# Patient Record
Sex: FEMALE | Race: WHITE | NOT HISPANIC OR LATINO | Employment: FULL TIME | URBAN - METROPOLITAN AREA
[De-identification: names, ages, dates, MRNs, and addresses within clinical notes are randomized per-mention and may not be internally consistent; named-entity substitution may affect disease eponyms.]

---

## 2017-02-02 ENCOUNTER — OFFICE VISIT (OUTPATIENT)
Dept: HEMATOLOGY | Facility: CLINIC | Age: 42
End: 2017-02-02
Attending: PHYSICIAN ASSISTANT
Payer: COMMERCIAL

## 2017-02-02 VITALS
DIASTOLIC BLOOD PRESSURE: 58 MMHG | BODY MASS INDEX: 27.87 KG/M2 | SYSTOLIC BLOOD PRESSURE: 116 MMHG | RESPIRATION RATE: 18 BRPM | TEMPERATURE: 98.6 F | WEIGHT: 178 LBS

## 2017-02-02 DIAGNOSIS — I89.0 LYMPHEDEMA: ICD-10-CM

## 2017-02-02 DIAGNOSIS — Z86.718 PERSONAL HISTORY OF DVT (DEEP VEIN THROMBOSIS): ICD-10-CM

## 2017-02-02 DIAGNOSIS — D68.51 FACTOR V LEIDEN MUTATION (H): Primary | ICD-10-CM

## 2017-02-02 PROCEDURE — 99211 OFF/OP EST MAY X REQ PHY/QHP: CPT

## 2017-02-02 PROCEDURE — 99215 OFFICE O/P EST HI 40 MIN: CPT | Performed by: PHYSICIAN ASSISTANT

## 2017-02-02 NOTE — PROGRESS NOTES
[facility]  [address] [place] [address] [address], [address] [address]  Phone: [phone], Fax: [phone].    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  She states she was on estrogen since about  but had recently switched to a different dose before she experienced her clotting event.  She denies having stopped and restarted estrogen around this time.  She was treated for approximately one year after her clot with anticoagulation.  She states she was on warfarin but had difficulties with keeping her INR in goal range.  She denies ongoing issues with left leg swelling but does have pain with prolonged sitting and wears compression stockings when this happens.  She recently had her first child (delivered baby sherri Lockwood on 10/9/16) and was treated with Lovenox during the pregnancy per MFM group.  Initially this was 40mg Lovenox daily and then transitioned to 40mg BID.  She did well with this regimen and was continued until 6 weeks postpartum.  She now is interested in her birth control options and wanted to discuss the risk of clotting in her daughter.    Shauna was tested for factor V Leiden when she experienced her clot as her mother had known factor V Leiden diagnosed when she had a lower extremity clot after hysterectomy and was started on HRT.  Since then other family members have also been tested and found to carry the mutation.      ROS:  Denies any bleeding issues. No gum bleeding, No nose bleeds. Denies any hematuria or blood in stools. Denies any ecchymosis. Denies any lower extremities swelling or pain. Denies any fever, no chest pain. Denies any shortness of breath.    Past Medical  History:  Past Medical History   Diagnosis Date     Factor V Leiden (H)      cannot have birth control     Embolus (H) 26 yo     of lung from right leg-was on coumadin for a long while     Acne      GERD (gastroesophageal reflux disease)      Menarche age 14+     cycles q  24-27 x 4-5d        Past Surgical History:  Past Surgical History   Procedure Laterality Date     Hc tooth extraction w/forcep         Medications:  Current Outpatient Prescriptions   Medication Sig Dispense Refill     enoxaparin (LOVENOX) 40 MG/0.4ML syringe Inject 0.4 mLs (40 mg) Subcutaneous every 24 hours 18 mL 0     ibuprofen (ADVIL,MOTRIN) 600 MG tablet Take 1 tablet (600 mg) by mouth every 6 hours as needed for other (cramping) 60 tablet 0     senna-docusate (SENOKOT-S;PERICOLACE) 8.6-50 MG per tablet Take 1-2 tablets by mouth 2 times daily 100 tablet 0     order for DME Equipment being ordered: Bilateral Electric Breastpump 1 each 0     Prenatal Multivit-Min-Fe-FA (PRENATAL VITAMINS PO)        Probiotic Product (PROBIOTIC DAILY PO)        Omega-3 Fatty Acids (OMEGA-3 FISH OIL PO) Take 1 g by mouth daily          Allergies:  Allergies   Allergen Reactions     Nkda [No Known Drug Allergies]        Social History:  Denies any tobacco use. No significant alcohol use. Denies any illicit drug use. 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    Family History:  Mother with heterozygote factor V Leiden and estrogen provoked clot, both sisters with the same (one during IVF and the other postpartum).  None are on long term anticoagulation.  Maternal uncle - homozygote factor V Leiden and first lower extremity clot at about age 60.  No provoking event noted.  Also Paternal family hx of pancreatic cancer    Objectives:  Pleasant 41 year old female in no acute distress.  Vitals: B/P: Data Unavailable, T: Data Unavailable, P: Data Unavailable, R: Data Unavailable, Wt: 0 lbs 0 oz  Exam:   Left lower extremity without swelling or skin  changes.    Labs:  4/2005  Factor V Leiden heterozygote  Prothrombin gene mutation negative  Lupus antibody negative    Imaging:  Reviewed initial imaging from 4/2005:  CT/angio: bilateral PE  Left lower extremity US: thrombus in distal left superficial femoral vein, popliteal vein and pair posterior tibial veins.  Follow-up US on a few weeks later shows some recannulization but no other repeat US was done.    Assessment:  In summary, Shauna is a 41 year old female with a personal and family history of DVT and known heterozygosity for factor V Leiden.     Diagnosis:  Personal hx of DVT and PE (4/7/2005)  Heterozygote factor V Leiden  On estrogen at time of clot    Plan:  1) We discussed risk factors for recurrent thrombosis and that for Shauna we would recommend she be on prophylactic anticoagulation during times of increased risk (surgery, immobility, pregnancy). This could be with Lovenox or with one of the new oral anticoagulants unless she is being treated for pregnancy.  She understands this.  2) For birth control we recommend progestin only options as they confer less risk of thrombosis.  She will discuss with her GYN provider.  3) We do not recommend genetic testing for her daughter at this time but do recommend, given the family history, that she not use estrogen based birth control in the future even if she were to be tested for factor V Leiden and found to be negative.    We are happy to see Shauna if she has questions related to thrombosis risk or becomes pregnant again.      The patient is given our center's contact information and is instructed to call if he/she should have any further questions or concerns.   Gerda Arnett, nurse clinician is present throughout the entire clinic visit with the patient today.  Patient understands and agrees with the above plan and recommendation.    Total Time Spent:  60 minutes, all 60 minutes was spent on face-to-face consultation of the patient and coordination of  care in regard to history of DVT and factor V Leiden      Alexsandra Barrios MPH, PA-C  Physician Assistant  [facility]  [phone]-main line  [phone] pager

## 2017-02-02 NOTE — PATIENT INSTRUCTIONS
1. Consider anticoagulation with any future pregnancy.  2. Work with genetic consoler regarding future cancer risk and family history of VTE.  3. Return to clinic as needed.

## 2017-02-03 NOTE — NURSING NOTE
Patient with history of proximal left LE DVT  and pulmonary embolism heterozygote for Factor V Leiden  Provider discussed BC options given personal and family history with Factor V Leiden heterozgosity.  Factor V Leiden reviewed along with family history of VTE.  VTE risk factors, signs, and symptoms discussed.  PTS reviewed and prescription for GCS knee high 20-30 mm Hg provided.  MsFrancisco J Hernan has the contact information for the Center and was encouraged to all with any questions or concerns.

## 2017-04-19 NOTE — PROGRESS NOTES
SUBJECTIVE:                                                    Shauna Becerra is a 42 year old female who presents to clinic today for the following health issues:      URINARY TRACT SYMPTOMS      Duration: 2 months intermittent    Description  Orange to peach colored urine especially when not drinking enough water    Intensity:  moderate    Accompanying signs and symptoms:  Fever/chills: no   Flank pain no   Nausea and vomiting: YES- had 2 GI illnesses over last month  Vaginal symptoms: none  Abdominal/Pelvic Pain: no     History  History of frequent UTI's: no   History of kidney stones: no   Sexually Active: YES  Possibility of pregnancy: No    Precipitating or alleviating factors: None    Therapies tried and outcome: increase fluid intake   Outcome: seems to help    Pt has also has URI with laryngitis, currently taking OTC sinus and Tylenol     Intermittent color changes in urine since January.  Sees to occur more often when she is not drinking as much water.  No dysuria or frequency.  Never happened before.      No fevers, abd pain, diarrhea.  No easy bruising, no blood in her stool.  No recent diet changes.  Is getting her menses but symptoms have occurred when she is not menstruating.      Had blood test for life insurance policy, was on heparin during pregnancy.  Thinks there may have been abnormal bilirubin.      URI symptoms ongoing x 1 week.  Sore throat has improved.  Has hoarse voice.  No rhinitis or congestion.  Does have facial pressure, headache.  Bilateral ear pressure.  Dry cough.  No fever.      Patient Active Problem List   Diagnosis     Factor V Leiden (H)     GERD (gastroesophageal reflux disease)     Primary female infertility     Anxiety state     Family history of pancreatic cancer     Need for Tdap vaccination     Family history of breast cancer     Supervision of other normal pregnancy, antepartum     Personal history of DVT (deep vein thrombosis)     Pregnancy      (spontaneous  vaginal delivery)     Past Surgical History:   Procedure Laterality Date     HC TOOTH EXTRACTION W/FORCEP         Social History   Substance Use Topics     Smoking status: Never Smoker     Smokeless tobacco: Never Used     Alcohol use No     Family History   Problem Relation Age of Onset     Thrombophilia Mother      Breast Cancer Mother      Migraines Mother      Genitourinary Problems Mother      GERD Mother      CANCER Father 63     Pancreatic     Breast Cancer Maternal Grandmother 70     Breast Cancer Paternal Grandmother      Uterine Cancer Paternal Grandmother      Chronic Obstructive Pulmonary Disease Paternal Grandfather      Deep Vein Thrombosis Other          Current Outpatient Prescriptions   Medication Sig Dispense Refill     COMPRESSION STOCKINGS Wear graduated compression knee high 20- 30 mm Hg while awake (Patient not taking: Reported on 4/20/2017) 2 each 1     enoxaparin (LOVENOX) 40 MG/0.4ML syringe Inject 0.4 mLs (40 mg) Subcutaneous every 24 hours (Patient not taking: Reported on 4/20/2017) 18 mL 0     ibuprofen (ADVIL,MOTRIN) 600 MG tablet Take 1 tablet (600 mg) by mouth every 6 hours as needed for other (cramping) (Patient not taking: Reported on 4/20/2017) 60 tablet 0     senna-docusate (SENOKOT-S;PERICOLACE) 8.6-50 MG per tablet Take 1-2 tablets by mouth 2 times daily (Patient not taking: Reported on 4/20/2017) 100 tablet 0     order for DME Equipment being ordered: Bilateral Electric Breastpump (Patient not taking: Reported on 4/20/2017) 1 each 0     Prenatal Multivit-Min-Fe-FA (PRENATAL VITAMINS PO) Reported on 4/20/2017       Probiotic Product (PROBIOTIC DAILY PO) Reported on 4/20/2017       Omega-3 Fatty Acids (OMEGA-3 FISH OIL PO) Take 1 g by mouth daily Reported on 4/20/2017         ROS:  Const, , GI as above, otherwise negative       OBJECTIVE:                                                    /62 (BP Location: Left arm, Patient Position: Chair, Cuff Size: Adult Regular)   Pulse 78  Temp 98.9  F (37.2  C) (Oral)  Resp 16  Wt 173 lb (78.5 kg)  SpO2 98%  BMI 27.1 kg/m2   GENERAL APPEARANCE: healthy, alert and no distress  EYES: Eyes grossly normal to inspection and PERRL  HENT: ear canals and TM's normal and nose and mouth without ulcers or lesions  NECK: no adenopathy  RESP: lungs clear to auscultation - no rales, rhonchi or wheezes  CV: regular rates and rhythm, normal S1 S2, no S3 or S4 and no murmur, click or rub  PSYCH: mentation appears normal and affect normal/bright     Diagnostic test results:  Diagnostic Test Results:  Results for orders placed or performed in visit on 04/20/17 (from the past 24 hour(s))   UA reflex to Microscopic and Culture   Result Value Ref Range    Color Urine Straw     Appearance Urine Clear     Glucose Urine Negative NEG mg/dL    Bilirubin Urine (A) NEG     Small  This is an unconfirmed screening test result. A positive result may be false.      Ketones Urine Trace (A) NEG mg/dL    Specific Gravity Urine 1.020 1.003 - 1.035    Blood Urine Negative NEG    pH Urine 6.0 5.0 - 7.0 pH    Protein Albumin Urine 30 (A) NEG mg/dL    Urobilinogen Urine 1.0 0.2 - 1.0 EU/dL    Nitrite Urine Negative NEG    Leukocyte Esterase Urine Negative NEG    Source Midstream Urine    Urine Microscopic   Result Value Ref Range    WBC Urine O - 2 0 - 2 /HPF    RBC Urine O - 2 0 - 2 /HPF    Squamous Epithelial /LPF Urine Few FEW /LPF    Bacteria Urine Few (A) NEG /HPF    Mucous Urine Present (A) NEG /LPF        ASSESSMENT/PLAN:                                                    (R39.89) Abnormal urine color  (primary encounter diagnosis)  (R82.2) Bilirubin in urine  Comment: UA neg for infection, + small amount bilirubin whch could explain color changes  Plan: UA reflex to Microscopic and Culture, Urine         Microscopic, Comprehensive metabolic panel, CBC        with platelets, Bilirubin direct, CANCELED:         Bilirubin Direct and Total        Follow up CMP today for  LFTs and bili level.  CBC for platelets.  If normal labs recommend monitor and follow up with urology if ongoing or worsening symptoms.      (J01.00) Acute non-recurrent maxillary sinusitis  Plan: discussed likely viral etiology with self limited course, recommend symptomatic cares, see patient instructions.  mychart if not improving by mon/tues and consider augmentin course.          See Patient Instructions    Evelin Hilliard CNP  [facility]    Patient Instructions   1.  Follow up blood work for bilirubin, liver tests, blood counts, and platelets     2.  Start sinus rinse like neti pot 2-3 times a day.  Use distilled or boiled water, mix with salt,    to 1 teaspoon of salt to each 16 ounces (two cups) of warm water.  Ibuprofen and heat packs for facial pain/pressure and headache.  mychart me if not improving by [email or web address].

## 2017-04-20 ENCOUNTER — OFFICE VISIT (OUTPATIENT)
Dept: FAMILY MEDICINE | Facility: CLINIC | Age: 42
End: 2017-04-20
Payer: COMMERCIAL

## 2017-04-20 VITALS
TEMPERATURE: 98.9 F | SYSTOLIC BLOOD PRESSURE: 102 MMHG | RESPIRATION RATE: 16 BRPM | DIASTOLIC BLOOD PRESSURE: 62 MMHG | HEART RATE: 78 BPM | BODY MASS INDEX: 27.1 KG/M2 | OXYGEN SATURATION: 98 % | WEIGHT: 173 LBS

## 2017-04-20 DIAGNOSIS — R82.2 BILIRUBIN IN URINE: ICD-10-CM

## 2017-04-20 DIAGNOSIS — R39.89 ABNORMAL URINE COLOR: Primary | ICD-10-CM

## 2017-04-20 DIAGNOSIS — J01.00 ACUTE NON-RECURRENT MAXILLARY SINUSITIS: ICD-10-CM

## 2017-04-20 LAB
ALBUMIN SERPL-MCNC: 3.4 G/DL (ref 3.4–5)
ALBUMIN UR-MCNC: 30 MG/DL
ALP SERPL-CCNC: 52 U/L (ref 40–150)
ALT SERPL W P-5'-P-CCNC: 19 U/L (ref 0–50)
ANION GAP SERPL CALCULATED.3IONS-SCNC: 5 MMOL/L (ref 3–14)
APPEARANCE UR: CLEAR
AST SERPL W P-5'-P-CCNC: 10 U/L (ref 0–45)
BACTERIA #/AREA URNS HPF: ABNORMAL /HPF
BILIRUB DIRECT SERPL-MCNC: 0.1 MG/DL (ref 0–0.2)
BILIRUB SERPL-MCNC: 0.4 MG/DL (ref 0.2–1.3)
BILIRUB UR QL STRIP: ABNORMAL
BUN SERPL-MCNC: 10 MG/DL (ref 7–30)
CALCIUM SERPL-MCNC: 8.9 MG/DL (ref 8.5–10.1)
CHLORIDE SERPL-SCNC: 106 MMOL/L (ref 94–109)
CO2 SERPL-SCNC: 29 MMOL/L (ref 20–32)
COLOR UR AUTO: ABNORMAL
CREAT SERPL-MCNC: 0.74 MG/DL (ref 0.52–1.04)
ERYTHROCYTE [DISTWIDTH] IN BLOOD BY AUTOMATED COUNT: 12.6 % (ref 10–15)
GFR SERPL CREATININE-BSD FRML MDRD: 87 ML/MIN/1.7M2
GLUCOSE SERPL-MCNC: 100 MG/DL (ref 70–99)
GLUCOSE UR STRIP-MCNC: NEGATIVE MG/DL
HCT VFR BLD AUTO: 39 % (ref 35–47)
HGB BLD-MCNC: 12.9 G/DL (ref 11.7–15.7)
HGB UR QL STRIP: NEGATIVE
KETONES UR STRIP-MCNC: ABNORMAL MG/DL
LEUKOCYTE ESTERASE UR QL STRIP: NEGATIVE
MCH RBC QN AUTO: 29 PG (ref 26.5–33)
MCHC RBC AUTO-ENTMCNC: 33.1 G/DL (ref 31.5–36.5)
MCV RBC AUTO: 88 FL (ref 78–100)
MUCOUS THREADS #/AREA URNS LPF: PRESENT /LPF
NITRATE UR QL: NEGATIVE
NON-SQ EPI CELLS #/AREA URNS LPF: ABNORMAL /LPF
PH UR STRIP: 6 PH (ref 5–7)
PLATELET # BLD AUTO: 248 10E9/L (ref 150–450)
POTASSIUM SERPL-SCNC: 4.1 MMOL/L (ref 3.4–5.3)
PROT SERPL-MCNC: 7.7 G/DL (ref 6.8–8.8)
RBC # BLD AUTO: 4.45 10E12/L (ref 3.8–5.2)
RBC #/AREA URNS AUTO: ABNORMAL /HPF (ref 0–2)
SODIUM SERPL-SCNC: 140 MMOL/L (ref 133–144)
SP GR UR STRIP: 1.02 (ref 1–1.03)
URN SPEC COLLECT METH UR: ABNORMAL
UROBILINOGEN UR STRIP-ACNC: 1 EU/DL (ref 0.2–1)
WBC # BLD AUTO: 8.9 10E9/L (ref 4–11)
WBC #/AREA URNS AUTO: ABNORMAL /HPF (ref 0–2)

## 2017-04-20 PROCEDURE — 80053 COMPREHEN METABOLIC PANEL: CPT | Performed by: NURSE PRACTITIONER

## 2017-04-20 PROCEDURE — 99213 OFFICE O/P EST LOW 20 MIN: CPT | Performed by: NURSE PRACTITIONER

## 2017-04-20 PROCEDURE — 85027 COMPLETE CBC AUTOMATED: CPT | Performed by: NURSE PRACTITIONER

## 2017-04-20 PROCEDURE — 36415 COLL VENOUS BLD VENIPUNCTURE: CPT | Performed by: NURSE PRACTITIONER

## 2017-04-20 PROCEDURE — 81001 URINALYSIS AUTO W/SCOPE: CPT | Performed by: NURSE PRACTITIONER

## 2017-04-20 PROCEDURE — 82248 BILIRUBIN DIRECT: CPT | Performed by: NURSE PRACTITIONER

## 2017-04-20 NOTE — NURSING NOTE
"Chief Complaint   Patient presents with     Urinary Problem       Initial /62 (BP Location: Left arm, Patient Position: Chair, Cuff Size: Adult Regular)  Pulse 78  Temp 98.9  F (37.2  C) (Oral)  Resp 16  Wt 173 lb (78.5 kg)  SpO2 98%  BMI 27.1 kg/m2 Estimated body mass index is 27.1 kg/(m^2) as calculated from the following:    Height as of 10/8/16: 5' 7\" (1.702 m).    Weight as of this encounter: 173 lb (78.5 kg).  Medication Reconciliation: complete     Mery Amos, CMA    "

## 2017-04-20 NOTE — MR AVS SNAPSHOT
After Visit Summary   4/20/2017    Shauna Becerra    MRN: [id number]           Patient Information     Date Of Birth          1975        Visit Information        Provider Department      4/20/2017 9:40 AM Evelin Hilliard APRN CNP [facility]        Today's Diagnoses     Abnormal urine color    -  1    Bilirubin in urine          Care Instructions    1.  Follow up blood work for bilirubin, liver tests, blood counts, and platelets     2.  Start sinus rinse like neti pot 2-3 times a day.  Use distilled or boiled water, mix with salt,    to 1 teaspoon of salt to each 16 ounces (two cups) of warm water.  Ibuprofen and heat packs for facial pain/pressure and headache.  mychart me if not improving by [email or web address].        Follow-ups after your visit        Who to contact     If you have questions or need follow up information about today's clinic visit or your schedule please contact [facility] directly at [phone].  Normal or non-critical lab and imaging results will be communicated to you by Liztic [employer]Chart, letter or phone within 4 business days after the clinic has received the results. If you do not hear from us within 7 days, please contact the clinic through Syn[employer] or phone. If you have a critical or abnormal lab result, we will notify you by phone as soon as possible.  Submit refill requests through [employer] or call your pharmacy and they will forward the refill request to us. Please allow 3 business days for your refill to be completed.          Additional Information About Your Visit        [employer] Information     Synchro gives you secure access to your electronic health record. If you see a primary care provider, you can also send messages to your care team and make appointments. If you have questions, please call your primary care clinic.  If you do not have a primary care provider, please call [phone] and they will assist you.        Care EveryWhere ID      This is your Care EveryWhere ID. This could be used by other organizations to access your [place] medical records  [id number]        Your Vitals Were     Pulse Temperature Respirations Pulse Oximetry BMI (Body Mass Index)       78 98.9  F (37.2  C) (Oral) 16 98% 27.1 kg/m2        Blood Pressure from Last 3 Encounters:   04/20/17 102/62   02/02/17 116/58   11/23/16 104/64    Weight from Last 3 Encounters:   04/20/17 173 lb (78.5 kg)   02/02/17 178 lb (80.7 kg)   11/23/16 175 lb (79.4 kg)              We Performed the Following     Bilirubin direct     CBC with platelets     Comprehensive metabolic panel     UA reflex to Microscopic and Culture     Urine Microscopic        Primary Care Provider Office Phone # Fax #    EvelinTHIAGO [facility] [phone]       [facility] [address]  [facility] [address]        Thank you!     Thank you for choosing [facility]  for your care. Our goal is always to provide you with excellent care. Hearing back from our patients is one way we can continue to improve our services. Please take a few minutes to complete the written survey that you may receive in the mail after your visit with us. Thank you!             Your Updated Medication List - Protect others around you: Learn how to safely use, store and throw away your medicines at www.disposemymeds.org.          This list is accurate as of: 4/20/17 10:12 AM.  Always use your most recent med list.                   Brand Name Dispense Instructions for use    COMPRESSION STOCKINGS     2 each    Wear graduated compression knee high 20- 30 mm Hg while awake       enoxaparin 40 MG/0.4ML injection    LOVENOX    18 mL    Inject 0.4 mLs (40 mg) Subcutaneous every 24 hours       ibuprofen 600 MG tablet    ADVIL/MOTRIN    60 tablet    Take 1 tablet (600 mg) by mouth every 6 hours as needed for other (cramping)       OMEGA-3 FISH OIL PO      Take 1 g by mouth daily Reported on  4/20/2017       order for DME     1 each    Equipment being ordered: Bilateral Electric Breastpump       PRENATAL VITAMINS PO      Reported on 4/20/2017       PROBIOTIC DAILY PO      Reported on 4/20/2017       senna-docusate 8.6-50 MG per tablet    SENOKOT-S;PERICOLACE    100 tablet    Take 1-2 tablets by mouth 2 times daily

## 2017-04-20 NOTE — PATIENT INSTRUCTIONS
1.  Follow up blood work for bilirubin, liver tests, blood counts, and platelets     2.  Start sinus rinse like neti pot 2-3 times a day.  Use distilled or boiled water, mix with salt,    to 1 teaspoon of salt to each 16 ounces (two cups) of warm water.  Ibuprofen and heat packs for facial pain/pressure and headache.  mychart me if not improving by [email or web address].

## 2017-04-20 NOTE — PROGRESS NOTES
Shauna,    Blood work shows normal bilirubin level as well as normal liver function, kidney function, and blood counts.  This is reassuring and I think we can proceed with monitoring the symptoms.  If they are worsening I would recommend follow up with urology.    Evelin Hilliard, CNP

## 2017-05-24 ENCOUNTER — OFFICE VISIT (OUTPATIENT)
Dept: FAMILY MEDICINE | Facility: CLINIC | Age: 42
End: 2017-05-24
Payer: COMMERCIAL

## 2017-05-24 VITALS
WEIGHT: 173 LBS | HEART RATE: 80 BPM | BODY MASS INDEX: 27.1 KG/M2 | DIASTOLIC BLOOD PRESSURE: 68 MMHG | SYSTOLIC BLOOD PRESSURE: 102 MMHG | RESPIRATION RATE: 15 BRPM | TEMPERATURE: 98.4 F

## 2017-05-24 DIAGNOSIS — J02.0 STREPTOCOCCAL SORE THROAT: Primary | ICD-10-CM

## 2017-05-24 DIAGNOSIS — R07.0 THROAT PAIN: ICD-10-CM

## 2017-05-24 LAB
DEPRECATED S PYO AG THROAT QL EIA: ABNORMAL
MICRO REPORT STATUS: ABNORMAL
SPECIMEN SOURCE: ABNORMAL

## 2017-05-24 PROCEDURE — 87880 STREP A ASSAY W/OPTIC: CPT | Performed by: INTERNAL MEDICINE

## 2017-05-24 PROCEDURE — 99213 OFFICE O/P EST LOW 20 MIN: CPT | Performed by: INTERNAL MEDICINE

## 2017-05-24 RX ORDER — AMOXICILLIN 500 MG/1
500 CAPSULE ORAL 3 TIMES DAILY
Qty: 30 CAPSULE | Refills: 0 | Status: SHIPPED | OUTPATIENT
Start: 2017-05-24 | End: 2018-01-04

## 2017-05-24 NOTE — MR AVS SNAPSHOT
After Visit Summary   5/24/2017    Shauna Becerra    MRN: [id number]           Patient Information     Date Of Birth          1975        Visit Information        Provider Department      5/24/2017 4:15 PM Jenn De MD [facility]        Today's Diagnoses     Streptococcal sore throat    -  1    Throat pain          Care Instructions    rapid strep test positive   Amoxicillin 500 mg 3 x day for 10 days.  Fluids  Rest            Follow-ups after your visit        Who to contact     If you have questions or need follow up information about today's clinic visit or your schedule please contact [facility] directly at [phone].  Normal or non-critical lab and imaging results will be communicated to you by MyChart, letter or phone within 4 business days after the clinic has received the results. If you do not hear from us within 7 days, please contact the clinic through UGOBEhart or phone. If you have a critical or abnormal lab result, we will notify you by phone as soon as possible.  Submit refill requests through "[employer] or call your pharmacy and they will forward the refill request to us. Please allow 3 business days for your refill to be completed.          Additional Information About Your Visit        MyChart Information     "[employer] gives you secure access to your electronic health record. If you see a primary care provider, you can also send messages to your care team and make appointments. If you have questions, please call your primary care clinic.  If you do not have a primary care provider, please call [phone] and they will assist you.        Care EveryWhere ID     This is your Care EveryWhere ID. This could be used by other organizations to access your [place] medical records  [id number]        Your Vitals Were     Pulse Temperature Respirations Last Period Breastfeeding? BMI (Body Mass Index)    80 98.4  F (36.9  C) (Oral)  15 05/17/2017 No 27.1 kg/m2       Blood Pressure from Last 3 Encounters:   05/24/17 102/68   04/20/17 102/62   02/02/17 116/58    Weight from Last 3 Encounters:   05/24/17 173 lb (78.5 kg)   04/20/17 173 lb (78.5 kg)   02/02/17 178 lb (80.7 kg)              We Performed the Following     Strep, Rapid Screen          Today's Medication Changes          These changes are accurate as of: 5/24/17  4:48 PM.  If you have any questions, ask your nurse or doctor.               Start taking these medicines.        Dose/Directions    amoxicillin 500 MG capsule   Commonly known as:  AMOXIL   Used for:  Streptococcal sore throat   Started by:  Jenn De MD        Dose:  500 mg   Take 1 capsule (500 mg) by mouth 3 times daily   Quantity:  30 capsule   Refills:  0            Where to get your medicines      These medications were sent to [facility] - [place], [place] - [address]  [address], [place] [address]     Phone:  [phone]     amoxicillin 500 MG capsule                Primary Care Provider Office Phone # Fax #    Evelin THIAGO Zamarripa [facility] [phone]       [place] [id number] [facility] [address]        Thank you!     Thank you for choosing [facility]  for your care. Our goal is always to provide you with excellent care. Hearing back from our patients is one way we can continue to improve our services. Please take a few minutes to complete the written survey that you may receive in the mail after your visit with us. Thank you!             Your Updated Medication List - Protect others around you: Learn how to safely use, store and throw away your medicines at www.disposemymeds.org.          This list is accurate as of: 5/24/17  4:48 PM.  Always use your most recent med list.                   Brand Name Dispense Instructions for use    amoxicillin 500 MG capsule    AMOXIL    30 capsule    Take 1 capsule (500 mg) by  mouth 3 times daily       COMPRESSION STOCKINGS     2 each    Wear graduated compression knee high 20- 30 mm Hg while awake       enoxaparin 40 MG/0.4ML injection    LOVENOX    18 mL    Inject 0.4 mLs (40 mg) Subcutaneous every 24 hours       ibuprofen 600 MG tablet    ADVIL/MOTRIN    60 tablet    Take 1 tablet (600 mg) by mouth every 6 hours as needed for other (cramping)       OMEGA-3 FISH OIL PO      Take 1 g by mouth daily Reported on 4/20/2017       order for DME     1 each    Equipment being ordered: Bilateral Electric Breastpump       PRENATAL VITAMINS PO      Reported on 4/20/2017       PROBIOTIC DAILY PO      Reported on 4/20/2017       senna-docusate 8.6-50 MG per tablet    SENOKOT-S;PERICOLACE    100 tablet    Take 1-2 tablets by mouth 2 times daily

## 2017-05-24 NOTE — PROGRESS NOTES
SUBJECTIVE:   Shauna Becerra is a 42 year old female presenting with a chief complaint of   Chief Complaint   Patient presents with     Pharyngitis     concern of strep. exposed to strep. Sore throat x 3 days. Has also had cold for 2 weeks now.      Stayed with family with Strep last week.  Onset of symptoms was 3 day(s) ago.    Current and Associated symptoms: fever, sore throat and headache  Treatment measures tried include tylenol.  Predisposing factors include strep exposure.    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 coumadin for a long while     Factor V Leiden (H)     cannot have birth control     GERD (gastroesophageal reflux disease)      Menarche age 14+    cycles q  24-27 x 4-5d      Current Outpatient Prescriptions   Medication Sig Dispense Refill     COMPRESSION STOCKINGS Wear graduated compression knee high 20- 30 mm Hg while awake (Patient not taking: Reported on 4/20/2017) 2 each 1     enoxaparin (LOVENOX) 40 MG/0.4ML syringe Inject 0.4 mLs (40 mg) Subcutaneous every 24 hours (Patient not taking: Reported on 4/20/2017) 18 mL 0     ibuprofen (ADVIL,MOTRIN) 600 MG tablet Take 1 tablet (600 mg) by mouth every 6 hours as needed for other (cramping) (Patient not taking: Reported on 4/20/2017) 60 tablet 0     senna-docusate (SENOKOT-S;PERICOLACE) 8.6-50 MG per tablet Take 1-2 tablets by mouth 2 times daily (Patient not taking: Reported on 4/20/2017) 100 tablet 0     order for DME Equipment being ordered: Bilateral Electric Breastpump (Patient not taking: Reported on 4/20/2017) 1 each 0     Prenatal Multivit-Min-Fe-FA (PRENATAL VITAMINS PO) Reported on 4/20/2017       Probiotic Product (PROBIOTIC DAILY PO) Reported on 4/20/2017       Omega-3 Fatty Acids (OMEGA-3 FISH OIL PO) Take 1 g by mouth daily Reported on 4/20/2017       Social History   Substance Use Topics     Smoking status: Never Smoker     Smokeless tobacco: Never Used      Alcohol use No       ROS:  CONSTITUTIONAL:feverish & chills.  Temp this morning normal   ENT/MOUTH: had nasty cold few weeks ago, resolved     OBJECTIVE  :/68  Pulse 80  Temp 98.4  F (36.9  C) (Oral)  Resp 15  Wt 173 lb (78.5 kg)  LMP 05/17/2017  Breastfeeding? No  BMI 27.1 kg/m2  GENERAL APPEARANCE: healthy, alert and no distress  HENT: TM's normal bilaterally and oral mucous membranes moist, no erythema noted  NECK: bilateral anterior cervical adenopathy  RESP: lungs clear to auscultation - no rales, rhonchi or wheezes  CV: regular rates and rhythm, normal S1 S2, no murmur noted    ASSESSMENT:  (J02.0) Streptococcal sore throat  (primary encounter diagnosis)  Comment:   Plan: amoxicillin (AMOXIL) 500 MG capsule            (R07.0) Throat pain  Comment:   Plan: Strep, Rapid Screen          Patient Instructions   rapid strep test positive   Amoxicillin 500 mg 3 x day for 10 days.  Fluids  Rest

## 2017-05-24 NOTE — NURSING NOTE
"Chief Complaint   Patient presents with     Pharyngitis     concern of strep. exposed to strep. Sore throat x 3 days. Has also had cold for 2 weeks now.        Initial /68  Pulse 80  Temp 98.4  F (36.9  C) (Oral)  Resp 15  Wt 173 lb (78.5 kg)  LMP 05/17/2017  Breastfeeding? No  BMI 27.1 kg/m2 Estimated body mass index is 27.1 kg/(m^2) as calculated from the following:    Height as of 10/8/16: 5' 7\" (1.702 m).    Weight as of this encounter: 173 lb (78.5 kg).  Medication Reconciliation: complete  "

## 2017-10-02 ENCOUNTER — MYC MEDICAL ADVICE (OUTPATIENT)
Dept: FAMILY MEDICINE | Facility: CLINIC | Age: 42
End: 2017-10-02

## 2017-10-02 NOTE — TELEPHONE ENCOUNTER
Evelin-please review.  Okay for patient to schedule screening mammogram directly?  Writer notes patient has been seen in clinic within the past year.    Thank you!  HETAL SilverioN, RN

## 2017-10-23 ENCOUNTER — MYC MEDICAL ADVICE (OUTPATIENT)
Dept: FAMILY MEDICINE | Facility: CLINIC | Age: 42
End: 2017-10-23

## 2018-01-04 ENCOUNTER — OFFICE VISIT (OUTPATIENT)
Dept: FAMILY MEDICINE | Facility: CLINIC | Age: 43
End: 2018-01-04
Payer: COMMERCIAL

## 2018-01-04 VITALS
BODY MASS INDEX: 27.64 KG/M2 | RESPIRATION RATE: 18 BRPM | HEIGHT: 66 IN | WEIGHT: 172 LBS | SYSTOLIC BLOOD PRESSURE: 121 MMHG | TEMPERATURE: 97.3 F | DIASTOLIC BLOOD PRESSURE: 82 MMHG | OXYGEN SATURATION: 96 % | HEART RATE: 72 BPM

## 2018-01-04 DIAGNOSIS — L05.01 PILONIDAL CYST WITH ABSCESS: Primary | ICD-10-CM

## 2018-01-04 DIAGNOSIS — L03.317 CELLULITIS OF BUTTOCK: ICD-10-CM

## 2018-01-04 PROCEDURE — 87077 CULTURE AEROBIC IDENTIFY: CPT | Performed by: FAMILY MEDICINE

## 2018-01-04 PROCEDURE — 87070 CULTURE OTHR SPECIMN AEROBIC: CPT | Performed by: FAMILY MEDICINE

## 2018-01-04 PROCEDURE — 99213 OFFICE O/P EST LOW 20 MIN: CPT | Mod: 25 | Performed by: FAMILY MEDICINE

## 2018-01-04 PROCEDURE — 10080 I&D PILONIDAL CYST SIMPLE: CPT | Performed by: FAMILY MEDICINE

## 2018-01-04 RX ORDER — CEPHALEXIN 500 MG/1
500 CAPSULE ORAL 2 TIMES DAILY
Qty: 10 CAPSULE | Refills: 0 | Status: SHIPPED | OUTPATIENT
Start: 2018-01-04 | End: 2018-01-09

## 2018-01-04 NOTE — MR AVS SNAPSHOT
After Visit Summary   1/4/2018    Shauna Becerra    MRN: [id number]           Patient Information     Date Of Birth          1975        Visit Information        Provider Department      1/4/2018 9:40 AM Arthur Talley MD [facility]        Today's Diagnoses     Pilonidal cyst with abscess    -  1    Cellulitis of buttock           Follow-ups after your visit        Your next 10 appointments already scheduled     Jan 05, 2018 10:40 AM CST   [facility]    [address] [place] [id number] [phone]              Who to contact     If you have questions or need follow up information about today's clinic visit or your schedule please contact [facility] directly at [phone].  Normal or non-critical lab and imaging results will be communicated to you by MyChart, letter or phone within 4 business days after the clinic has received the results. If you do not hear from us within 7 days, please contact the clinic through MyChart or phone. If you have a critical or abnormal lab result, we will notify you by phone as soon as possible.  Submit refill requests through [email or web address] or call your pharmacy and they will forward the refill request to us. Please allow 3 business days for your refill to be completed.          Additional Information About Your Visit        MyChart Information     [email or web address] gives you secure access to your electronic health record. If you see a primary care provider, you can also send messages to your care team and make appointments. If you have questions, please call your primary care clinic.  If you do not have a primary care provider, please call [phone] and they will assist you.        Care EveryWhere ID     This is your Care EveryWhere ID. This could be used by other organizations to access your [place] medical records  [id number]    "     Your Vitals Were     Pulse Temperature Respirations Height Last Period Pulse Oximetry    72 97.3  F (36.3  C) (Tympanic) 18 5' 5.75\" (1.67 m) 12/23/2017 (Approximate) 96%    BMI (Body Mass Index)                   27.97 kg/m2            Blood Pressure from Last 3 Encounters:   01/04/18 121/82   05/24/17 102/68   04/20/17 102/62    Weight from Last 3 Encounters:   01/04/18 172 lb (78 kg)   05/24/17 173 lb (78.5 kg)   04/20/17 173 lb (78.5 kg)              We Performed the Following     DRAIN PILONIDAL CYST SIMPLE     Wound Culture Aerobic Bacterial          Today's Medication Changes          These changes are accurate as of: 1/4/18 12:38 PM.  If you have any questions, ask your nurse or doctor.               Start taking these medicines.        Dose/Directions    cephALEXin 500 MG capsule   Commonly known as:  KEFLEX   Used for:  Pilonidal cyst with abscess, Cellulitis of buttock   Started by:  Arthur Talley MD        Dose:  500 mg   Take 1 capsule (500 mg) by mouth 2 times daily for 5 days   Quantity:  10 capsule   Refills:  0            Where to get your medicines      These medications were sent to [facility] - [place], [place] - [address]  [address], [place] [address]     Phone:  [phone]     cephALEXin 500 MG capsule                Primary Care Provider Office Phone # Fax #    Evelin Anita Tellez, APRN [facility] [phone]       [address]  [facility] [address]        Equal Access to Services     LORNE SOTO AH: Hadii werner mcclendono Sorena, waaxda luqadaha, qaybta kaalmada adeegyada, waxay idishlomo fajardo. So [facility] [phone].    ATENCIÓN: Si habla español, tiene a mejia disposición servicios gratuitos de asistencia lingüística. Llame al [phone].    We comply with applicable federal civil rights laws and [place] laws. We do not discriminate on the basis of race, color, national origin, age, disability, sex, sexual orientation, " or gender identity.            Thank you!     Thank you for choosing [facility]  for your care. Our goal is always to provide you with excellent care. Hearing back from our patients is one way we can continue to improve our services. Please take a few minutes to complete the written survey that you may receive in the mail after your visit with us. Thank you!             Your Updated Medication List - Protect others around you: Learn how to safely use, store and throw away your medicines at www.disposemymeds.org.          This list is accurate as of: 1/4/18 12:38 PM.  Always use your most recent med list.                   Brand Name Dispense Instructions for use Diagnosis    cephALEXin 500 MG capsule    KEFLEX    10 capsule    Take 1 capsule (500 mg) by mouth 2 times daily for 5 days    Pilonidal cyst with abscess, Cellulitis of buttock       OMEGA-3 FISH OIL PO      Take 1 g by mouth daily Reported on 4/20/2017        PROBIOTIC DAILY PO      Reported on 4/20/2017

## 2018-01-04 NOTE — NURSING NOTE
"Chief Complaint   Patient presents with     Mass     bump on the top of buttocks        Initial /82  Pulse 72  Temp 97.3  F (36.3  C) (Tympanic)  Resp 18  Ht 5' 5.75\" (1.67 m)  Wt 172 lb (78 kg)  LMP 12/23/2017 (Approximate)  SpO2 96%  BMI 27.97 kg/m2 Estimated body mass index is 27.97 kg/(m^2) as calculated from the following:    Height as of this encounter: 5' 5.75\" (1.67 m).    Weight as of this encounter: 172 lb (78 kg).  Medication Reconciliation: complete     Salvador Lazo MA    "

## 2018-01-04 NOTE — PROGRESS NOTES
"  SUBJECTIVE:   Shauna Becerra is a 42 year old female who presents to clinic today for the following health issues:    Pt in for bump on the top of buttocks. Present for about a week worsening    No fever. Tender. Hard to sit.     Has had slight pain in the area in the past but never such that this lasted or was so severe.    No discharge.    med hx, family hx, and soc hx reviewed and updated    Otherwise healthy.    OBJECTIVE: /82  Pulse 72  Temp 97.3  F (36.3  C) (Tympanic)  Resp 18  Ht 5' 5.75\" (1.67 m)  Wt 172 lb (78 kg)  LMP 12/23/2017 (Approximate)  SpO2 96%  BMI 27.97 kg/m2 no apparent distress  Upper gluteal cleft with erythematous indurated area with about 2-3 cm of surrounding erythema/warmth. The indurated is not fluctuant but is quite tender. There seems to be indurated area of about 1cm x 2cm in each side of midline. They seem to be next to each other but possibly separate.       ICD-10-CM    1. Pilonidal cyst with abscess L05.01 cephALEXin (KEFLEX) 500 MG capsule     DRAIN PILONIDAL CYST SIMPLE     Wound Culture Aerobic Bacterial   2. Cellulitis of buttock L03.317 cephALEXin (KEFLEX) 500 MG capsule     DRAIN PILONIDAL CYST SIMPLE     Wound Culture Aerobic Bacterial    After informed verbal consent was obtained, risks, alternatives discussed, using Betadine for cleansing and 1% Lidocaine with epinephrine for anesthetic, with sterile technique a 11 blade was used to incise the lesion the lesion and hemostat used to explore and to break up adhesions. Result of procedure was small amount of purulent drainage. Wick placed.  and wound care instructions provided. Be alert for any signs of worsening cutaneous infection. The specimen is labeled and sent to pathology for evaluation. The procedure was well tolerated without complications. follow up on day for wound check. Provided the keflex for the surrounding cellulitis.     "

## 2018-01-05 ENCOUNTER — OFFICE VISIT (OUTPATIENT)
Dept: FAMILY MEDICINE | Facility: CLINIC | Age: 43
End: 2018-01-05
Payer: COMMERCIAL

## 2018-01-05 VITALS
RESPIRATION RATE: 16 BRPM | TEMPERATURE: 97.4 F | OXYGEN SATURATION: 97 % | SYSTOLIC BLOOD PRESSURE: 122 MMHG | DIASTOLIC BLOOD PRESSURE: 85 MMHG | HEART RATE: 70 BPM | BODY MASS INDEX: 28.14 KG/M2 | WEIGHT: 173 LBS

## 2018-01-05 DIAGNOSIS — L05.01 PILONIDAL CYST WITH ABSCESS: Primary | ICD-10-CM

## 2018-01-05 PROCEDURE — 99024 POSTOP FOLLOW-UP VISIT: CPT | Performed by: FAMILY MEDICINE

## 2018-01-05 NOTE — MR AVS SNAPSHOT
After Visit Summary   1/5/2018    Shauna Becerra    MRN: [id number]           Patient Information     Date Of Birth          1975        Visit Information        Provider Department      1/5/2018 10:40 AM Arthur Talley MD [facility]        Today's Diagnoses     Pilonidal cyst with abscess    -  1       Follow-ups after your visit        Who to contact     If you have questions or need follow up information about today's clinic visit or your schedule please contact [facility] directly at [phone].  Normal or non-critical lab and imaging results will be communicated to you by [employer], letter or phone within 4 business days after the clinic has received the results. If you do not hear from us within 7 days, please contact the clinic through [employer] or phone. If you have a critical or abnormal lab result, we will notify you by phone as soon as possible.  Submit refill requests through [employer] or call your pharmacy and they will forward the refill request to us. Please allow 3 business days for your refill to be completed.          Additional Information About Your Visit        MyChart Information     [employer] gives you secure access to your electronic health record. If you see a primary care provider, you can also send messages to your care team and make appointments. If you have questions, please call your primary care clinic.  If you do not have a primary care provider, please call [phone] and they will assist you.        Care EveryWhere ID     This is your Care EveryWhere ID. This could be used by other organizations to access your [place] medical records  [id number]        Your Vitals Were     Pulse Temperature Respirations Last Period Pulse Oximetry BMI (Body Mass Index)    70 97.4  F (36.3  C) (Tympanic) 16 12/23/2017 (Approximate) 97% 28.14 kg/m2       Blood Pressure from Last 3 Encounters:   01/05/18 122/85   01/04/18 121/82    05/24/17 102/68    Weight from Last 3 Encounters:   01/05/18 173 lb (78.5 kg)   01/04/18 172 lb (78 kg)   05/24/17 173 lb (78.5 kg)              Today, you had the following     No orders found for display       Primary Care Provider Office Phone # Fax #    THIAGO Yip [facility] [phone]       [address]  [facility] [address]        Equal Access to Services     LORNE SOTO : Hadii aad ku hadasho Soomaali, waaxda luqadaha, qaybta kaalmada adeegyada, waxay idiin hayaan adeeg kharash lageoffrey . So [facility] [phone].    ATENCIÓN: Si habla español, tiene a mejia disposición servicios gratuitos de asistencia lingüística. LlAdena [facility] [phone].    We comply with applicable federal civil rights laws and [place] laws. We do not discriminate on the basis of race, color, national origin, age, disability, sex, sexual orientation, or gender identity.            Thank you!     Thank you for choosing [facility]  for your care. Our goal is always to provide you with excellent care. Hearing back from our patients is one way we can continue to improve our services. Please take a few minutes to complete the written survey that you may receive in the mail after your visit with us. Thank you!             Your Updated Medication List - Protect others around you: Learn how to safely use, store and throw away your medicines at www.disposemymeds.org.          This list is accurate as of: 1/5/18 12:57 PM.  Always use your most recent med list.                   Brand Name Dispense Instructions for use Diagnosis    cephALEXin 500 MG capsule    KEFLEX    10 capsule    Take 1 capsule (500 mg) by mouth 2 times daily for 5 days    Pilonidal cyst with abscess, Cellulitis of buttock       OMEGA-3 FISH OIL PO      Take 1 g by mouth daily Reported on 4/20/2017        PROBIOTIC DAILY PO      Reported on 4/20/2017

## 2018-01-05 NOTE — PROGRESS NOTES
SUBJECTIVE:   Shauna Becerra is a 42 year old female who presents to clinic today for the following health issues:    Pt in for wound care. Having pain and she is alternating  between IBU and tylenol     OBJECTIVE: /85 (BP Location: Left arm, Patient Position: Sitting, Cuff Size: Adult Regular)  Pulse 70  Temp 97.4  F (36.3  C) (Tympanic)  Resp 16  Wt 173 lb (78.5 kg)  LMP 12/23/2017 (Approximate)  SpO2 97%  BMI 28.14 kg/m2 .    The erythema is decreased in size but the induration is about the same. There was some purulent drainage here in the clinic.       ICD-10-CM    1. Pilonidal cyst with abscess L05.01     Conservative treatment measures discussed.. Wound cares discussed. If the drainage stops nad swelling worsens may need to do deeper I and d. follow up if fever or worsening pain spreading erythema.

## 2018-01-05 NOTE — NURSING NOTE
"Chief Complaint   Patient presents with     WOUND CARE       Initial /85 (BP Location: Left arm, Patient Position: Sitting, Cuff Size: Adult Regular)  Pulse 70  Temp 97.4  F (36.3  C) (Tympanic)  Resp 16  Wt 173 lb (78.5 kg)  LMP 12/23/2017 (Approximate)  SpO2 97%  BMI 28.14 kg/m2 Estimated body mass index is 28.14 kg/(m^2) as calculated from the following:    Height as of 1/4/18: 5' 5.75\" (1.67 m).    Weight as of this encounter: 173 lb (78.5 kg).  Medication Reconciliation: complete     Salvador Lazo MA      "

## 2018-01-07 LAB
BACTERIA SPEC CULT: ABNORMAL
BACTERIA SPEC CULT: ABNORMAL
SPECIMEN SOURCE: ABNORMAL

## 2018-04-30 ENCOUNTER — OFFICE VISIT (OUTPATIENT)
Dept: FAMILY MEDICINE | Facility: CLINIC | Age: 43
End: 2018-04-30
Payer: COMMERCIAL

## 2018-04-30 VITALS
TEMPERATURE: 97.8 F | HEART RATE: 59 BPM | OXYGEN SATURATION: 99 % | HEIGHT: 66 IN | DIASTOLIC BLOOD PRESSURE: 75 MMHG | SYSTOLIC BLOOD PRESSURE: 112 MMHG | WEIGHT: 172.25 LBS | BODY MASS INDEX: 27.68 KG/M2

## 2018-04-30 DIAGNOSIS — J20.9 ACUTE BRONCHITIS, UNSPECIFIED ORGANISM: Primary | ICD-10-CM

## 2018-04-30 PROCEDURE — 99213 OFFICE O/P EST LOW 20 MIN: CPT | Performed by: FAMILY MEDICINE

## 2018-04-30 RX ORDER — AZITHROMYCIN 250 MG/1
TABLET, FILM COATED ORAL
Qty: 6 TABLET | Refills: 0 | Status: SHIPPED | OUTPATIENT
Start: 2018-04-30 | End: 2018-09-14

## 2018-04-30 RX ORDER — ALBUTEROL SULFATE 90 UG/1
2 AEROSOL, METERED RESPIRATORY (INHALATION) EVERY 6 HOURS PRN
Qty: 1 INHALER | Refills: 0 | Status: SHIPPED | OUTPATIENT
Start: 2018-04-30 | End: 2019-10-08

## 2018-04-30 NOTE — MR AVS SNAPSHOT
After Visit Summary   4/30/2018    Shauna Becerra    MRN: [id number]           Patient Information     Date Of Birth          1975        Visit Information        Provider Department      4/30/2018 4:40 PM Rae Peterson MD [facility]        Today's Diagnoses     Acute bronchitis, unspecified organism    -  1      Care Instructions      Acute Bronchitis  Your healthcare provider has told you that you have acute bronchitis. Bronchitis is infection or inflammation of the bronchial tubes (airways in the lungs). Normally, air moves easily in and out of the airways. Bronchitis narrows the airways, making it harder for air to flow in and out of the lungs. This causes symptoms such as shortness of breath, coughing up yellow or green mucus, and wheezing. Bronchitis can be acute or chronic. Acute means the condition comes on quickly and goes away in a short time, usually within 3 to 10 days. Chronic means a condition lasts a long time and often comes back.    What causes acute bronchitis?  Acute bronchitis almost always starts as a viral respiratory infection, such as a cold or the flu. Certain factors make it more likely for a cold or flu to turn into bronchitis. These include being very young, being elderly, having a heart or lung problem, or having a weak immune system. Cigarette smoking also makes bronchitis more likely.  When bronchitis develops, the airways become swollen. The airways may also become infected with bacteria. This is known as a secondary infection.  Diagnosing acute bronchitis  Your healthcare provider will examine you and ask about your symptoms and health history. You may also have a sputum culture to test the fluid in your lungs. Chest X-rays may be done to look for infection in the lungs.  Treating acute bronchitis  Bronchitis usually clears up as the cold or flu goes away. You can help feel better faster by doing the following:    Take medicine as directed.  You may be told to take ibuprofen or other over-the-counter medicines. These help relieve inflammation in your bronchial tubes. Your healthcare provider may prescribe an inhaler to help open up the bronchial tubes. Most of the time, acute bronchitis is caused by a viral infection. Antibiotics are usually not prescribed for viral infections.    Drink plenty of fluids, such as water, juice, or warm soup. Fluids loosen mucus so that you can cough it up. This helps you breathe more easily. Fluids also prevent dehydration.    Make sure you get plenty of rest.    Do not smoke. Do not allow anyone else to smoke in your home.  Recovery and follow-up  Follow up with your doctor as you are told. You will likely feel better in a week or two. But a dry cough can linger beyond that time. Let your doctor know if you still have symptoms (other than a dry cough) after 2 weeks, or if you re prone to getting bronchial infections. Take steps to protect yourself from future infections. These steps include stopping smoking and avoiding tobacco smoke, washing your hands often, and getting a yearly flu shot.  When to call your healthcare provider  Call the healthcare provider if you have any of the following:    Fever of 100.4 F (38.0 C) or higher, or as advised    Symptoms that get worse, or new symptoms    Trouble breathing    Symptoms that don t start to improve within a week, or within 3 days of taking antibiotics   Date Last Reviewed: 12/1/2016 2000-2017 [facility]. [address] [place] [address], [place] [address]. All rights reserved. This information is not intended as a substitute for professional medical care. Always follow your healthcare professional's instructions.                Follow-ups after your visit        Who to contact     If you have questions or need follow up information about today's clinic visit or your schedule please contact [facility] directly at [phone].  Normal or non-critical  "lab and imaging results will be communicated to you by [facility], letter or phone within 4 business days after the clinic has received the results. If you do not hear from us within 7 days, please contact the clinic through [facility] or phone. If you have a critical or abnormal lab result, we will notify you by phone as soon as possible.  Submit refill requests through [facility] or call your pharmacy and they will forward the refill request to us. Please allow 3 business days for your refill to be completed.          Additional Information About Your Visit        [facility] Information     [facility] gives you secure access to your electronic health record. If you see a primary care provider, you can also send messages to your care team and make appointments. If you have questions, please call your primary care clinic.  If you do not have a primary care provider, please call [phone] and they will assist you.        Care EveryWhere ID     This is your Care EveryWhere ID. This could be used by other organizations to access your Clear medical records  [id number]        Your Vitals Were     Pulse Temperature Height Pulse Oximetry BMI (Body Mass Index)       59 97.8  F (36.6  C) (Oral) 5' 6\" (1.676 m) 99% 27.8 kg/m2        Blood Pressure from Last 3 Encounters:   04/30/18 112/75   01/05/18 122/85   01/04/18 121/82    Weight from Last 3 Encounters:   04/30/18 172 lb 4 oz (78.1 kg)   01/05/18 173 lb (78.5 kg)   01/04/18 172 lb (78 kg)              Today, you had the following     No orders found for display         Today's Medication Changes          These changes are accurate as of 4/30/18  5:23 PM.  If you have any questions, ask your nurse or doctor.               Start taking these medicines.        Dose/Directions    albuterol 108 (90 Base) MCG/ACT Inhaler   Commonly known as:  PROAIR HFA/PROVENTIL HFA/VENTOLIN HFA   Used for:  Acute bronchitis, unspecified organism        Dose:  2 puff   Inhale 2 puffs into the lungs " every 6 hours as needed for shortness of breath / dyspnea or wheezing   Quantity:  1 Inhaler   Refills:  0       azithromycin 250 MG tablet   Commonly known as:  ZITHROMAX   Used for:  Acute bronchitis, unspecified organism        Two tablets first day, then one tablet daily for four days.   Quantity:  6 tablet   Refills:  0            Where to get your medicines      These medications were sent to [facility] [address]  [address], [facility] [address]     Phone:  [phone]     albuterol 108 (90 Base) MCG/ACT Inhaler         Some of these will need a paper prescription and others can be bought over the counter.  Ask your nurse if you have questions.     Bring a paper prescription for each of these medications     azithromycin 250 MG tablet                Primary Care Provider Office Phone # Fax #    Evelin Anita Tellez, THIAGO [phone]       [address]  [facility] [address]        Equal Access to Services     LORNE SOTO AH: Hadii werner chester hadasho Soomaali, waaxda luqadaha, qaybta kaalmada adeegyada, arianne mirelesin robbie rice . So [facility] [phone].    ATENCIÓN: Si habla español, tiene a mejia disposición servicios gratuitos de asistencia lingüística. Llame al [phone].    We comply with applicable federal civil rights laws and [place] laws. We do not discriminate on the basis of race, color, national origin, age, disability, sex, sexual orientation, or gender identity.            Thank you!     Thank you for choosing [facility]  for your care. Our goal is always to provide you with excellent care. Hearing back from our patients is one way we can continue to improve our services. Please take a few minutes to complete the written survey that you may receive in the mail after your visit with us. Thank you!             Your Updated Medication List - Protect others around you: Learn how to safely use, store and  throw away your medicines at www.disposemymeds.org.          This list is accurate as of 4/30/18  5:23 PM.  Always use your most recent med list.                   Brand Name Dispense Instructions for use Diagnosis    albuterol 108 (90 Base) MCG/ACT Inhaler    PROAIR HFA/PROVENTIL HFA/VENTOLIN HFA    1 Inhaler    Inhale 2 puffs into the lungs every 6 hours as needed for shortness of breath / dyspnea or wheezing    Acute bronchitis, unspecified organism       azithromycin 250 MG tablet    ZITHROMAX    6 tablet    Two tablets first day, then one tablet daily for four days.    Acute bronchitis, unspecified organism       OMEGA-3 FISH OIL PO      Take 1 g by mouth daily Reported on 4/20/2017        PROBIOTIC DAILY PO      Reported on 4/20/2017

## 2018-04-30 NOTE — PROGRESS NOTES
SUBJECTIVE:   Shauna Becerra is a 43 year old female who presents to clinic today for the following health issues:      RESPIRATORY SYMPTOMS    Duration: 1 week     Description  nasal congestion, rhinorrhea, sore throat, facial pain/pressure, cough (gets to the point were she wants to throw up, she feels it more in her chest) , wheezing, headache, fatigue/malaise and hoarse voice    Severity: moderate    Accompanying signs and symptoms: None    History (predisposing factors):  strep throat     Precipitating or alleviating factors: None    Therapies tried and outcome:  rest and fluids, acetaminophen      Problem list and histories reviewed & adjusted, as indicated.  Additional history: as documented    Patient notes that her daughter had similar symptoms, but they resolved in a few days with only rhinorrhea lingering. She thinks it is similar to that, but it has settled her chest instead of her sinuses. She is tired and drained of energy. Denies fever or shortness of breath.  She notes that she was having coughing fits where she would cough so hard that she felt she would vomit. She switched to Mucinex two days ago and that has helped the feeling of almost vomiting when coughing. She states that she cannot really cough anything up. She had tried robitussin-dm but it didn't help. Mucinex helped. She states that the symptoms progressively worsened until 4 days ago and have stayed at that level since then. She notes that when she was pregnant she was diagnosed as borderline asthmatic and so she has an inhaler.        Patient Active Problem List   Diagnosis     Factor V Leiden (H)     GERD (gastroesophageal reflux disease)     Primary female infertility     Anxiety state     Family history of pancreatic cancer     Need for Tdap vaccination     Family history of breast cancer     Supervision of other normal pregnancy, antepartum     Personal history of DVT (deep vein thrombosis)     Pregnancy      (spontaneous  vaginal delivery)     Past Surgical History:   Procedure Laterality Date     HC TOOTH EXTRACTION W/FORCEP         Social History   Substance Use Topics     Smoking status: Never Smoker     Smokeless tobacco: Never Used     Alcohol use No     Family History   Problem Relation Age of Onset     Thrombophilia Mother      Breast Cancer Mother      Migraines Mother      Genitourinary Problems Mother      GERD Mother      CANCER Father 63     Pancreatic     Breast Cancer Maternal Grandmother 70     Breast Cancer Paternal Grandmother      Uterine Cancer Paternal Grandmother      Chronic Obstructive Pulmonary Disease Paternal Grandfather      Deep Vein Thrombosis Other          Current Outpatient Prescriptions   Medication Sig Dispense Refill     albuterol (PROAIR HFA/PROVENTIL HFA/VENTOLIN HFA) 108 (90 Base) MCG/ACT Inhaler Inhale 2 puffs into the lungs every 6 hours as needed for shortness of breath / dyspnea or wheezing 1 Inhaler 0     azithromycin (ZITHROMAX) 250 MG tablet Two tablets first day, then one tablet daily for four days. 6 tablet 0     Omega-3 Fatty Acids (OMEGA-3 FISH OIL PO) Take 1 g by mouth daily Reported on 4/20/2017       Probiotic Product (PROBIOTIC DAILY PO) Reported on 4/20/2017       Allergies   Allergen Reactions     Nkda [No Known Drug Allergies]      Recent Labs   Lab Test  04/20/17   1016  12/30/15   0944  12/17/14   1356  12/12/14   1352  02/22/13   0805   LDL   --    --    --    --   116   HDL   --    --    --    --   63   TRIG   --    --    --    --   83   ALT  19   --   20   --   31   CR  0.74  0.82   --    --   0.69   GFRESTIMATED  87  78   --    --   >90   GFRESTBLACK  >90   GFR Calc    >90   GFR Calc     --    --   >90   POTASSIUM  4.1  4.1   --    --    --    TSH   --   3.27   --   1.61   --       BP Readings from Last 3 Encounters:   04/30/18 112/75   01/05/18 122/85   01/04/18 121/82    Wt Readings from Last 3 Encounters:   04/30/18 172 lb 4 oz (78.1 kg)  "  01/05/18 173 lb (78.5 kg)   01/04/18 172 lb (78 kg)        Reviewed and updated as needed this visit by clinical staff  Allergies  Meds       Reviewed and updated as needed this visit by Provider       ROS:  See above    This document serves as a record of the services and decisions personally performed and made by Rae Peterson MD. It was created on his/her behalf by Lianet Sanches, trained medical scribe. The creation of this document is based the provider's statements to the medical scribes.    Scribe Lianet Sanches, April 30, 2018  OBJECTIVE:     /75  Pulse 59  Temp 97.8  F (36.6  C) (Oral)  Ht 5' 6\" (1.676 m)  Wt 172 lb 4 oz (78.1 kg)  SpO2 99%  BMI 27.8 kg/m2  Body mass index is 27.8 kg/(m^2).     Exam:  GENERAL APPEARANCE:  alert and no acute distress  EYES:anicteric, no conjunctival redness  HENT: ear canals and TM's normal and nose and mouth without ulcers or lesions; OP mildly erythematous without swelling or exudates  RESP: lungs clear to auscultation - no rales, rhonchi or wheezes  CV: regular rates and rhythm, normal S1 S2, no S3 or S4 and no murmur, click or rub -     Diagnostic Test Results:  No results found for this or any previous visit (from the past 24 hour(s)).    ASSESSMENT/PLAN:     1. Acute bronchitis, unspecified organism   7 days of illness, she will start the albuterol 2 puffs four times a day and additionally as needed. If not improving over the next few days or if worsening, she will start z-pack. She will let us know if not improving at that point.   - azithromycin (ZITHROMAX) 250 MG tablet; Two tablets first day, then one tablet daily for four days.  Dispense: 6 tablet; Refill: 0  - albuterol (PROAIR HFA/PROVENTIL HFA/VENTOLIN HFA) 108 (90 Base) MCG/ACT Inhaler; Inhale 2 puffs into the lungs every 6 hours as needed for shortness of breath / dyspnea or wheezing  Dispense: 1 Inhaler; Refill: 0    Acute Bronchitis  Your healthcare provider has told you that you have " acute bronchitis. Bronchitis is infection or inflammation of the bronchial tubes (airways in the lungs). Normally, air moves easily in and out of the airways. Bronchitis narrows the airways, making it harder for air to flow in and out of the lungs. This causes symptoms such as shortness of breath, coughing up yellow or green mucus, and wheezing. Bronchitis can be acute or chronic. Acute means the condition comes on quickly and goes away in a short time, usually within 3 to 10 days. Chronic means a condition lasts a long time and often comes back.    What causes acute bronchitis?  Acute bronchitis almost always starts as a viral respiratory infection, such as a cold or the flu. Certain factors make it more likely for a cold or flu to turn into bronchitis. These include being very young, being elderly, having a heart or lung problem, or having a weak immune system. Cigarette smoking also makes bronchitis more likely.  When bronchitis develops, the airways become swollen. The airways may also become infected with bacteria. This is known as a secondary infection.  Diagnosing acute bronchitis  Your healthcare provider will examine you and ask about your symptoms and health history. You may also have a sputum culture to test the fluid in your lungs. Chest X-rays may be done to look for infection in the lungs.  Treating acute bronchitis  Bronchitis usually clears up as the cold or flu goes away. You can help feel better faster by doing the following:    Take medicine as directed. You may be told to take ibuprofen or other over-the-counter medicines. These help relieve inflammation in your bronchial tubes. Your healthcare provider may prescribe an inhaler to help open up the bronchial tubes. Most of the time, acute bronchitis is caused by a viral infection. Antibiotics are usually not prescribed for viral infections.    Drink plenty of fluids, such as water, juice, or warm soup. Fluids loosen mucus so that you can cough it  up. This helps you breathe more easily. Fluids also prevent dehydration.    Make sure you get plenty of rest.    Do not smoke. Do not allow anyone else to smoke in your home.  Recovery and follow-up  Follow up with your doctor as you are told. You will likely feel better in a week or two. But a dry cough can linger beyond that time. Let your doctor know if you still have symptoms (other than a dry cough) after 2 weeks, or if you re prone to getting bronchial infections. Take steps to protect yourself from future infections. These steps include stopping smoking and avoiding tobacco smoke, washing your hands often, and getting a yearly flu shot.  When to call your healthcare provider  Call the healthcare provider if you have any of the following:    Fever of 100.4 F (38.0 C) or higher, or as advised    Symptoms that get worse, or new symptoms    Trouble breathing    Symptoms that don t start to improve within a week, or within 3 days of taking antibiotics   Date Last Reviewed: 12/1/2016 2000-2017 [employer]. [address] [place] [address]. All rights reserved. This information is not intended as a substitute for professional medical care. Always follow your healthcare professional's instructions.      The information in this document, created by the medical scribe for me, accurately reflects the services I personally performed and the decisions made by me. I have reviewed and approved this document for accuracy. 04/30/18    Rae Peterson MD  [facility]

## 2018-04-30 NOTE — PATIENT INSTRUCTIONS

## 2018-09-14 ENCOUNTER — TELEPHONE (OUTPATIENT)
Dept: GASTROENTEROLOGY | Facility: CLINIC | Age: 43
End: 2018-09-14

## 2018-09-14 ENCOUNTER — OFFICE VISIT (OUTPATIENT)
Dept: FAMILY MEDICINE | Facility: CLINIC | Age: 43
End: 2018-09-14
Payer: COMMERCIAL

## 2018-09-14 VITALS
OXYGEN SATURATION: 100 % | BODY MASS INDEX: 25.82 KG/M2 | TEMPERATURE: 98.1 F | HEART RATE: 67 BPM | SYSTOLIC BLOOD PRESSURE: 116 MMHG | DIASTOLIC BLOOD PRESSURE: 75 MMHG | WEIGHT: 160 LBS

## 2018-09-14 DIAGNOSIS — Z80.0 FAMILY HISTORY OF PANCREATIC CANCER: ICD-10-CM

## 2018-09-14 DIAGNOSIS — R11.0 NAUSEA: Primary | ICD-10-CM

## 2018-09-14 LAB
ALBUMIN SERPL-MCNC: 4.1 G/DL (ref 3.4–5)
ALP SERPL-CCNC: 44 U/L (ref 40–150)
ALT SERPL W P-5'-P-CCNC: 15 U/L (ref 0–50)
ANION GAP SERPL CALCULATED.3IONS-SCNC: 7 MMOL/L (ref 3–14)
AST SERPL W P-5'-P-CCNC: 13 U/L (ref 0–45)
BETA HCG QUAL IFA URINE: NEGATIVE
BILIRUB SERPL-MCNC: 0.9 MG/DL (ref 0.2–1.3)
BUN SERPL-MCNC: 12 MG/DL (ref 7–30)
CALCIUM SERPL-MCNC: 9 MG/DL (ref 8.5–10.1)
CHLORIDE SERPL-SCNC: 105 MMOL/L (ref 94–109)
CO2 SERPL-SCNC: 26 MMOL/L (ref 20–32)
CREAT SERPL-MCNC: 0.78 MG/DL (ref 0.52–1.04)
ERYTHROCYTE [DISTWIDTH] IN BLOOD BY AUTOMATED COUNT: 13 % (ref 10–15)
ERYTHROCYTE [SEDIMENTATION RATE] IN BLOOD BY WESTERGREN METHOD: 8 MM/H (ref 0–20)
GFR SERPL CREATININE-BSD FRML MDRD: 81 ML/MIN/1.7M2
GLUCOSE SERPL-MCNC: 93 MG/DL (ref 70–99)
HCT VFR BLD AUTO: 42.4 % (ref 35–47)
HGB BLD-MCNC: 14.2 G/DL (ref 11.7–15.7)
LIPASE SERPL-CCNC: 158 U/L (ref 73–393)
MCH RBC QN AUTO: 29.8 PG (ref 26.5–33)
MCHC RBC AUTO-ENTMCNC: 33.5 G/DL (ref 31.5–36.5)
MCV RBC AUTO: 89 FL (ref 78–100)
PLATELET # BLD AUTO: 209 10E9/L (ref 150–450)
POTASSIUM SERPL-SCNC: 4.2 MMOL/L (ref 3.4–5.3)
PROT SERPL-MCNC: 8.2 G/DL (ref 6.8–8.8)
RBC # BLD AUTO: 4.76 10E12/L (ref 3.8–5.2)
SODIUM SERPL-SCNC: 138 MMOL/L (ref 133–144)
WBC # BLD AUTO: 5.5 10E9/L (ref 4–11)

## 2018-09-14 PROCEDURE — 99214 OFFICE O/P EST MOD 30 MIN: CPT | Performed by: FAMILY MEDICINE

## 2018-09-14 PROCEDURE — 83690 ASSAY OF LIPASE: CPT | Performed by: FAMILY MEDICINE

## 2018-09-14 PROCEDURE — 80053 COMPREHEN METABOLIC PANEL: CPT | Performed by: FAMILY MEDICINE

## 2018-09-14 PROCEDURE — 85027 COMPLETE CBC AUTOMATED: CPT | Performed by: FAMILY MEDICINE

## 2018-09-14 PROCEDURE — 85652 RBC SED RATE AUTOMATED: CPT | Performed by: FAMILY MEDICINE

## 2018-09-14 PROCEDURE — 36415 COLL VENOUS BLD VENIPUNCTURE: CPT | Performed by: FAMILY MEDICINE

## 2018-09-14 PROCEDURE — 84703 CHORIONIC GONADOTROPIN ASSAY: CPT | Performed by: FAMILY MEDICINE

## 2018-09-14 RX ORDER — ONDANSETRON 4 MG/1
4 TABLET, ORALLY DISINTEGRATING ORAL
Qty: 20 TABLET | Refills: 0 | Status: SHIPPED | OUTPATIENT
Start: 2018-09-14 | End: 2019-10-08

## 2018-09-14 NOTE — PATIENT INSTRUCTIONS
Daily probiotic   If it is not helpful that you can try daily metamucil to help regulate stool.   zofran 4 mg every 8 hours as needed for nausea.   If your symptoms do not improve, please follow up with GI clinic and schedule CT scan. Please call [phone] to schedule imaging.

## 2018-09-14 NOTE — PROGRESS NOTES
SUBJECTIVE:   Shauna Becerra is a 43 year old female who presents to clinic today for the following health issues:      For the last three weeks she has been experiencing intermittent nausea. Certain foods make it worse. Sometime she noticed that she starts feeling nauseous and that she either needs to go to bathroom or has gas. If she does go, the nausea will improve after 15 minutes. Sometimes she gets 1/2 or 2/3rd through her meal and she can't finish meal due to nausea. She has some loose stools and other times where her stools are small. She has also not been eating the same. Around three years ago, she had a similar episode where she had imaging - normal and symptoms improved. Then another time she had done an elimination diet which improved. She has been under some stress. Her mom has history of IBS. Dad and maternal grandmother -  away from pancreatic cancer. Recently she lost weight - however she has been exercising. She also has decreased appetite due to nausea. She endorses occasional gas pain. Once or twice she has had intermittent heartburn. She has been taking Omeprazole 40 mg once daily X 1 week. She feels that medication hasn't made a difference. No fever, chills, vomit, bloating or constipation. LMP 18.      Problem list and histories reviewed & adjusted, as indicated.  Additional history: as documented    Labs reviewed in EPIC    Reviewed and updated as needed this visit by clinical staff  Tobacco  Allergies  Meds  Med Hx  Surg Hx  Fam Hx  Soc Hx      Reviewed and updated as needed this visit by Provider         ROS:  Constitutional, HEENT, cardiovascular, pulmonary, gi and gu systems are negative, except as otherwise noted.    OBJECTIVE:     /75 (BP Location: Left arm, Patient Position: Sitting, Cuff Size: Adult Regular)  Pulse 67  Temp 98.1  F (36.7  C) (Oral)  Wt 160 lb (72.6 kg)  LMP 2018 (Approximate)  SpO2 100%  BMI 25.82 kg/m2  Body mass index is  25.82 kg/(m^2).  GENERAL: healthy, alert and no distress  EYES: Eyes grossly normal to inspection  HENT: nose and mouth without ulcers or lesions  ABDOMEN: soft, + lower abdominal tender, no hepatosplenomegaly, no masses and bowel sounds normal, no guarding, no rigidity   MS: no gross musculoskeletal defects noted, no edema  SKIN: no suspicious lesions or rashes    Diagnostic Test Results:  Results for orders placed or performed in visit on 09/14/18 (from the past 24 hour(s))   CBC with platelets   Result Value Ref Range    WBC 5.5 4.0 - 11.0 10e9/L    RBC Count 4.76 3.8 - 5.2 10e12/L    Hemoglobin 14.2 11.7 - 15.7 g/dL    Hematocrit 42.4 35.0 - 47.0 %    MCV 89 78 - 100 fl    MCH 29.8 26.5 - 33.0 pg    MCHC 33.5 31.5 - 36.5 g/dL    RDW 13.0 10.0 - 15.0 %    Platelet Count 209 150 - 450 10e9/L   ESR: Erythrocyte sedimentation rate   Result Value Ref Range    Sed Rate 8 0 - 20 mm/h   Beta HCG qual IFA urine   Result Value Ref Range    Beta HCG Qual IFA Urine Negative NEG^Negative          ASSESSMENT/PLAN:     ## Nausea  - Family history of pancreatic cancer. Reviewed previous labs and CT scan. No clear etiology. Pt is not having UTI vs vaginitis symptoms. She can tolerate gluten, so celiac testing wasn't done. D/d include gastritis vs GERD vs PUD vs gallstones vs preganancy. I obtained below for further evaluation and tx as indicated.   - Advised below   Daily probiotic   If it is not helpful that you can try daily metamucil to help regulate stool.   zofran 4 mg every 8 hours as needed for nausea.   If your symptoms do not improve within next 7-10 days, please follow up with GI clinic and schedule CT scan. Please call [phone] to schedule imaging.   - CBC with platelets  - Comprehensive metabolic panel (BMP + Alb, Alk Phos, ALT, AST, Total. Bili, TP)  - ESR: Erythrocyte sedimentation rate  - Lipase  - Beta HCG qual IFA urine  - GASTROENTEROLOGY ADULT REF CONSULT ONLY  - ondansetron (ZOFRAN ODT) 4 MG ODT tab; Take  1 tablet (4 mg) by mouth 3 times daily (before meals)  Dispense: 20 tablet; Refill: 0  - CT Abdomen Pelvis w Contrast; Future    2. Family history of pancreatic cancer  - see above         Kate Teran MD  [facility]

## 2018-09-14 NOTE — MR AVS SNAPSHOT
After Visit Summary   9/14/2018    Shauna Becerra    MRN: [id number]           Patient Information     Date Of Birth          1975        Visit Information        Provider Department      9/14/2018 8:20 AM Kate Teran MD [facility]        Today's Diagnoses     Nausea    -  1      Care Instructions    Daily probiotic   If it is not helpful that you can try daily metamucil to help regulate stool.   zofran 4 mg every 8 hours as needed for nausea.   If your symptoms do not improve, please follow up with GI clinic and schedule CT scan. Please call [phone] to schedule imaging.           Follow-ups after your visit        Your next 10 appointments already scheduled     Oct 16, 2018 12:00 PM CDT   (Arrive by 11:45 AM)   NEW WITH ROOM with Ninfa Law GC,  2 114 CONSULT [facility] ([facility])    [address] [place] [address]  [address]  [facility] [id number] [phone]            Oct 16, 2018  1:15 PM CDT   [facility]   [facility] Lab Draw ([facility])    [address] [place] [address]  [address]  [facility] [id number] [phone]            Oct 18, 2018  9:30 AM CDT   MA SCREENING DIGITAL BILATERAL with UCBCMA1   [facility] ([facility])    [address] [place] [address], 2nd Floor  [facility] [id number] [phone]           Do not use any powder, lotion or deodorant under your arms or on your breast. If you do, we will ask you to remove it before your exam.  Wear comfortable, two-piece clothing.  If you have any allergies, tell your care team.  Bring any previous mammograms from other facilities or have them mailed to the breast center. Three-dimensional (3D) mammograms are available at [place] locations in [facility], [facility], [facility], [facility], and [place]. [facility]  "locations include [facility] in [place]. Benefits of 3D mammograms include: - Improved rate of cancer detection - Decreases your chance of having to go back for more tests, which means fewer: - \"False-positive\" results (This means that there is an abnormal area but it isn't cancer.) - Invasive testing procedures, such as a biopsy or surgery - Can provide clearer images of the breast if you have dense breast tissue. 3D mammography is an optional exam that anyone can have with a 2D mammogram. It doesn't replace or take the place of a 2D mammogram. 2D mammograms remain an effective screening test for all women.  Not all insurance companies cover the cost of a 3D mammogram. Check with your insurance.              Who to contact     If you have questions or need follow up information about today's clinic visit or your schedule please contact [facility] directly at [phone].  Normal or non-critical lab and imaging results will be communicated to you by MODLOFThart, letter or phone within 4 business days after the clinic has received the results. If you do not hear from us within 7 days, please contact the clinic through Xylemet or phone. If you have a critical or abnormal lab result, we will notify you by phone as soon as possible.  Submit refill requests through P21 or call your pharmacy and they will forward the refill request to us. Please allow 3 business days for your refill to be completed.          Additional Information About Your Visit        P21 Information     P21 gives you secure access to your electronic health record. If you see a primary care provider, you can also send messages to your care team and make appointments. If you have questions, please call your primary care clinic.  If you do not have a primary care provider, please call [phone] and they will assist you.        Care EveryWhere ID     This is your Care EveryWhere ID. This could be " used by other organizations to access your [place] medical records  [id number]        Your Vitals Were     Pulse Temperature Last Period Pulse Oximetry BMI (Body Mass Index)       67 98.1  F (36.7  C) (Oral) 08/24/2018 (Approximate) 100% 25.82 kg/m2        Blood Pressure from Last 3 Encounters:   09/14/18 116/75   04/30/18 112/75   01/05/18 122/85    Weight from Last 3 Encounters:   09/14/18 160 lb (72.6 kg)   04/30/18 172 lb 4 oz (78.1 kg)   01/05/18 173 lb (78.5 kg)              Today, you had the following     No orders found for display       Primary Care Provider Office Phone # Fax #    Evelin THIAGO Zamarripa [facility] [phone] [address]  [facility] [address]        Equal Access to Services     VIKAS [facility] Health CenterRAMÍREZ : Hadii aad ku hadasho Sorena, waaxda luqadaha, qaybta kaalmada adeegyada, arianne rico hayjohny rice . So [facility] [phone].    ATENCIÓN: Si habla español, tiene a mejia disposición servicios gratuitos de asistencia lingüística. Llame al [phone].    We comply with applicable federal civil rights laws and [place] laws. We do not discriminate on the basis of race, color, national origin, age, disability, sex, sexual orientation, or gender identity.            Thank you!     Thank you for choosing [facility]  for your care. Our goal is always to provide you with excellent care. Hearing back from our patients is one way we can continue to improve our services. Please take a few minutes to complete the written survey that you may receive in the mail after your visit with us. Thank you!             Your Updated Medication List - Protect others around you: Learn how to safely use, store and throw away your medicines at www.disposemymeds.org.          This list is accurate as of 9/14/18  9:23 AM.  Always use your most recent med list.                   Brand Name Dispense Instructions for use Diagnosis    albuterol 108 (90 Base) MCG/ACT inhaler     PROAIR HFA/PROVENTIL HFA/VENTOLIN HFA    1 Inhaler    Inhale 2 puffs into the lungs every 6 hours as needed for shortness of breath / dyspnea or wheezing    Acute bronchitis, unspecified organism       OMEGA-3 FISH OIL PO      Take 1 g by mouth daily Reported on 4/20/2017        PROBIOTIC DAILY PO      Reported on 4/20/2017

## 2018-09-14 NOTE — TELEPHONE ENCOUNTER
LVM for patient in regards to message received from the call center. Message stated patient was self-referred but patient has an internal referral for clinic. Informed patient we are able to schedule an appointment. Left call back number for patient to call and schedule appointment.

## 2018-09-21 ASSESSMENT — ENCOUNTER SYMPTOMS
VOMITING: 0
CONSTIPATION: 1
JAUNDICE: 0
HOT FLASHES: 0
DIARRHEA: 1
BLOOD IN STOOL: 0
BOWEL INCONTINENCE: 0
BLOATING: 0
ABDOMINAL PAIN: 0
NAUSEA: 1
DECREASED LIBIDO: 0
RECTAL PAIN: 0
HEARTBURN: 1

## 2018-09-28 ENCOUNTER — OFFICE VISIT (OUTPATIENT)
Dept: GASTROENTEROLOGY | Facility: CLINIC | Age: 43
End: 2018-09-28

## 2018-09-28 VITALS
OXYGEN SATURATION: 100 % | HEART RATE: 74 BPM | DIASTOLIC BLOOD PRESSURE: 80 MMHG | BODY MASS INDEX: 25.76 KG/M2 | SYSTOLIC BLOOD PRESSURE: 128 MMHG | HEIGHT: 66 IN | WEIGHT: 160.3 LBS | TEMPERATURE: 97.9 F

## 2018-09-28 DIAGNOSIS — R11.0 NAUSEA: Primary | ICD-10-CM

## 2018-09-28 ASSESSMENT — PAIN SCALES - GENERAL: PAINLEVEL: NO PAIN (0)

## 2018-09-28 NOTE — PATIENT INSTRUCTIONS
It was a pleasure taking care of you today.  I've included a brief summary of our discussion and care plan from today's visit below.  Please review this information with your primary care provider.  _______________________________________________________________________    My recommendations are summarized as follows:  1. If you are having ongoing symptoms, please send us a Pixways message or call our clinic and we can set up an upper endoscopy and a gastric emptying scan.   2. If you develop abdominal pain, we can discuss abdominal imaging such as an abdominal ultrasound.   3. Zofran is a good treatment for nausea. We could also try metoclopramide (Reglan).   --    Return to GI Clinic as needed to review your progress.    _______________________________________________________________________    Who do I call with any questions after my visit?  Please be in touch if there are any further questions that arise following today's visit.  There are multiple ways to contact your gastroenterology care team.        During business hours, you may reach a Gastroenterology nurse at [phone] and choose option 3.         To schedule or reschedule an appointment, please call [phone].       You can always send a secure message through Pixways.  Pixways messages are answered by your nurse or doctor typically within 24 hours.  Please allow extra time on weekends and holidays.        For urgent/emergent questions after business hours, you may reach the on-call GI Fellow by contacting the [facility] at [phone].     How will I get the results of any tests ordered?    You will receive all of your results.  If you have signed up for Pixways, any tests ordered at your visit will be available to you after your physician reviews them.  Typically this takes 1-2 weeks.  If there are urgent results that require a change in your care plan, your physician or nurse will call you to discuss the next steps.       What is [employer]?  [employer] is a secure way for you to access all of your healthcare records from the [facility].  It is a web based computer program, so you can sign on to it from any location.  It also allows you to send secure messages to your care team.  I recommend signing up for [employer] access if you have not already done so and are comfortable with using a computer.      How to I schedule a follow-up visit?  If you did not schedule a follow-up visit today, please call [phone] to schedule a follow-up office visit.        Sincerely,    Sonu Sawant MD  Fellow  [facility]  Division of Gastroenterology

## 2018-09-28 NOTE — NURSING NOTE
"Chief Complaint   Patient presents with     Nausea     Nausea 3-4 weeks.       Vitals:    09/28/18 0826   BP: 128/80   BP Location: Left arm   Pulse: 74   Temp: 97.9  F (36.6  C)   TempSrc: Oral   SpO2: 100%   Weight: 160 lb 4.8 oz   Height: 5' 6\"       Body mass index is 25.87 kg/(m^2).      Giulia Rogers, EMT                      "

## 2018-09-28 NOTE — PROGRESS NOTES
I performed a history and physical examination of the above patient and discussed the management with Dr. Sawant on 9/28/2018. I reviewed the note and there are no changes to the past medical, family or social history.  A complete 10 point review of systems was obtained. Please see the HPI for pertinent positives and negatives. All other systems were reviewed and were found to be negative. I agree with the documented findings and plan of care as outlined.    Alexis Trujlilo MD  GI Attending  Pager: [phone]

## 2018-09-28 NOTE — MR AVS SNAPSHOT
After Visit Summary   9/28/2018    Shauna Becerra    MRN: [id number]           Patient Information     Date Of Birth          1975        Visit Information        Provider Department      9/28/2018 8:40 AM Sonu Sawant [facility]        Care Instructions    It was a pleasure taking care of you today.  I've included a brief summary of our discussion and care plan from today's visit below.  Please review this information with your primary care provider.  _______________________________________________________________________    My recommendations are summarized as follows:  1. If you are having ongoing symptoms, please send us a Kickplay message or call our clinic and we can set up an upper endoscopy and a gastric emptying scan.   2. If you develop abdominal pain, we can discuss abdominal imaging such as an abdominal ultrasound.   3. Zofran is a good treatment for nausea. We could also try metoclopramide (Reglan).   --    Return to GI Clinic as needed to review your progress.    _______________________________________________________________________    Who do I call with any questions after my visit?  Please be in touch if there are any further questions that arise following today's visit.  There are multiple ways to contact your gastroenterology care team.        During business hours, you may reach a Gastroenterology nurse at [phone] and choose option 3.         To schedule or reschedule an appointment, please call [phone].       You can always send a secure message through Kickplay.  Kickplay messages are answered by your nurse or doctor typically within 24 hours.  Please allow extra time on weekends and holidays.        For urgent/emergent questions after business hours, you may reach the on-call GI Fellow by contacting the [facility] at [phone].     How will I get the results of any tests ordered?    You will receive all of  your results.  If you have signed up for Autoparts24hart, any tests ordered at your visit will be available to you after your physician reviews them.  Typically this takes 1-2 weeks.  If there are urgent results that require a change in your care plan, your physician or nurse will call you to discuss the next steps.      What is Autoparts24hart?  [employer] is a secure way for you to access all of your healthcare records from the [facility].  It is a web based computer program, so you can sign on to it from any location.  It also allows you to send secure messages to your care team.  I recommend signing up for [employer] access if you have not already done so and are comfortable with using a computer.      How to I schedule a follow-up visit?  If you did not schedule a follow-up visit today, please call [phone] to schedule a follow-up office visit.        Sincerely,    Sonu Sawant MD  Fellow  [facility]  Division of Gastroenterology          Follow-ups after your visit        Your next 10 appointments already scheduled     Oct 16, 2018 12:00 PM CDT   (Arrive by 11:45 AM)   NEW WITH ROOM with Ninfa Law GC,  2 114 CONSULT RM   [facility] ([facility])    [address]  [address]  [facility] [id number] [phone]            Oct 16, 2018  1:15 PM CDT   [facility]   [facility] Lab Draw ([facility])    [address]  [address]  [facility] [address]   [phone]            Oct 18, 2018  9:30 AM CDT   MA SCREENING DIGITAL BILATERAL with UCBCMA1   [facility] ([facility])    [address] [place] [address], 2nd Floor  [facility] [address] [phone]           How do I prepare for my exam? (Food and drink instructions) No Food and Drink Restrictions.  How do I prepare for my exam? (Other instructions) Do not use any powder,  "lotion or deodorant under your arms or on your breast. If you do, we will ask you to remove it before your exam.  What should I wear: Wear comfortable, two-piece clothing.  How long does the exam take: Most scans will take 15 minutes.  What should I bring: Bring any previous mammograms from other facilities or have them mailed to the breast center.  Do I need a :  No  is needed.  What do I need to tell my doctor: If you have any allergies, tell your care team.  What should I do after the exam: No restrictions, You may resume normal activities.  What is this test: This test is an x-ray of the breast to look for breast disease. The breast is pressed between two plates to flatten and spread the tissue. An X-ray is taken of the breast from different angles.  Who should I call with questions: If you have any questions, please call the Imaging Department where you will have your exam. Directions, parking instructions, and other information is available on our website, [email or web address].  Other information about my exam Three-dimensional (3D) mammograms are available at [place] locations in [facility], [facility], [place], [facility] and [place]. [facility] locations include [place] and the [facility] in [place].  Benefits of 3D mammograms include * Improved rate of cancer detection * Decreases your chance of having to go back for more tests, which means fewer: * \"False-positive\" results (This means that there is an abnormal area but it isn't cancer.) * Invasive testing procedures, such as a biopsy or surgery * Can provide clearer images of the breast if you have dense breast tissue.  *3D mammography is an optional exam that anyone can have with a 2D mammogram. It doesn't replace or take the place of a 2D mammogram. 2D mammograms remain an effective screening test for all women.  Not all insurance companies cover the cost of a 3D mammogram. Check with " "your insurance. Three-dimensional (3D) mammograms are available at [place] locations in [place], [place], [place], [place], [facility], [place], [place], and [place]. [facility] locations include [place] and [facility] in [place]. Benefits of 3D mammograms include: - Improved rate of cancer detection - Decreases your chance of having to go back for more tests, which means fewer: - \"False-positive\" results (This means that there is an abnormal area but it isn't cancer.) - Invasive testing procedures, such as a biopsy or surgery - Can provide clearer images of the breast if you have dense breast tissue. 3D mammography is an optional exam that anyone can have with a 2D mammogram. It doesn't replace or take the place of a 2D mammogram. 2D mammograms remain an effective screening test for all women.  Not all insurance companies cover the cost of a 3D mammogram. Check with your insurance.              Who to contact     Please call your clinic at [phone] to:    Ask questions about your health    Make or cancel appointments    Discuss your medicines    Learn about your test results    Speak to your doctor            Additional Information About Your Visit        Whitevector Information     White[employer] gives you secure access to your electronic health record. If you see a primary care provider, you can also send messages to your care team and make appointments. If you have questions, please call your primary care clinic.  If you do not have a primary care provider, please call [phone] and they will assist you.      Whitevector is an electronic gateway that provides easy, online access to your medical records. With Whitevector, you can request a clinic appointment, read your test results, renew a prescription or communicate with your care team.     To access your existing account, please contact your [facility] or call [phone] for assistance.        Care EveryWhere " "ID     This is your Care EveryWhere ID. This could be used by other organizations to access your [place] medical records  [id number]        Your Vitals Were     Pulse Temperature Height Pulse Oximetry BMI (Body Mass Index)       74 97.9  F (36.6  C) (Oral) 5' 6\" 100% 25.87 kg/m2        Blood Pressure from Last 3 Encounters:   09/28/18 128/80   09/14/18 116/75   04/30/18 112/75    Weight from Last 3 Encounters:   09/28/18 160 lb 4.8 oz   09/14/18 160 lb   04/30/18 172 lb 4 oz              Today, you had the following     No orders found for display       Primary Care Provider Office Phone # Fax #    Evelin THIAGO Zamarripa [facility] [phone]       [address]  [facility] [address]        Equal Access to Services     LORNE SOTO : Hadii werner chester hadasho Sorena, waaxda luqadaha, qaybta kaalmada adeegyada, arianne rice . So [facility] [phone].    ATENCIÓN: Si habla español, tiene a mejia disposición servicios gratuitos de asistencia lingüística. Shauna al [phone].    We comply with applicable federal civil rights laws and [place] laws. We do not discriminate on the basis of race, color, national origin, age, disability, sex, sexual orientation, or gender identity.            Thank you!     Thank you for choosing [facility]  for your care. Our goal is always to provide you with excellent care. Hearing back from our patients is one way we can continue to improve our services. Please take a few minutes to complete the written survey that you may receive in the mail after your visit with us. Thank you!             Your Updated Medication List - Protect others around you: Learn how to safely use, store and throw away your medicines at www.disposemymeds.org.          This list is accurate as of 9/28/18  9:43 AM.  Always use your most recent med list.                   Brand Name Dispense Instructions for use Diagnosis    albuterol 108 (90 Base) " MCG/ACT inhaler    PROAIR HFA/PROVENTIL HFA/VENTOLIN HFA    1 Inhaler    Inhale 2 puffs into the lungs every 6 hours as needed for shortness of breath / dyspnea or wheezing    Acute bronchitis, unspecified organism       OMEGA-3 FISH OIL PO      Take 1 g by mouth daily Reported on 4/20/2017        ondansetron 4 MG ODT tab    ZOFRAN ODT    20 tablet    Take 1 tablet (4 mg) by mouth 3 times daily (before meals)    Nausea       PROBIOTIC DAILY PO      Reported on 4/20/2017

## 2018-10-13 NOTE — PROGRESS NOTES
GI CLINIC VISIT    CC/REFERRING MD:  Kate Teran  REASON FOR CONSULTATION:   The pt is a 43 year old female who I was asked to see in consultation at the request of Dr. Kate Teran for   Chief Complaint   Patient presents with     Nausea     Nausea 3-4 weeks.     ASSESSMENT/PLAN:  43-year-old female with history of factor V Leiden heterozygosity with provoked DVT/PE (birth control) and family history of pancreatic cancer (father, paternal grandmother) who presents for intermittent and now resolved nausea.    May represent nausea from intermittent GERD/esophagitis, postinfectious gastroparesis, less likely H. pylori, central nausea, mesenteric ischemia, intermittent partial bowel obstructions or food allergies.  Not on any medications that would cause nausea.  Less likely to represent small bowel obstructions.    She has an appointment with genetic counseling to discuss family history of pancreatic cancer.    - if symptoms recur, would start with upper endoscopy and a gastric emptying scan.   - Zofran PRN   - intermittent courses of omeprazole for GERD, would try empirically if nausea recurs   - agree with genetics referral    RTC PRN    Seen and discussed with attending, Dr Trujillo.     Thank you for this consultation.  It was a pleasure to participate in the care of this patient; please contact us with any further questions.     Sonu Sawant MD  Fellow  Division of Gastroenterology, Hepatology and Nutrition  [facility]      HPI  43-year-old female with history of factor V Leiden heterozygosity with provoked DVT/PE (birth control) and family history of pancreatic cancer (father, paternal grandmother) who presents for intermittent and now resolved nausea.    She developed daily nausea around the end of August, lasted for approximately 3 weeks.  Was present all the time, improved if not eating.  At the same time she noticed a change in bowel frequency, varying between 0 bowel movements  per day to 3-4/day that were loose.  She denies abdominal pain and bloating.  Symptoms have now largely resolved.  She noted worse symptoms with certain foods such as coffee, nuts, beer, red wine.  Similar symptoms occurred initially 3-4 years ago, which then resolved and re-occurred approximately 9 months later.  That point she had a CT scan which was normal.  She had food sensitivity testing which identified several foods that she reacted strongly to and she has since avoided those.  She had noted good improvement with Zofran.    She has intentionally lost approximately 13 pounds of weight through diet and exercise.  She endorses heartburn which is stress-induced and improves with intermittent courses of omeprazole.  She denies anxiety and depression, although has felt more stressed recently.    Given her strong family history of pancreatic cancer, she is planning to see genetics this month.    ROS:    No fevers or chills  +weight loss, intentional  No blurry vision, double vision or change in vision  No sore throat  No lymphadenopathy  No headache, paraesthesias, or weakness in a limb  No shortness of breath or wheezing  No chest pain or pressure  No arthralgias or myalgias  +rash on legs from chigger bites  No odynophagia or dysphagia  No BRBPR, hematochezia, melena  No dysuria, frequency or urgency  No hot/cold intolerance  No anxiety or depression    PROBLEM LIST  Patient Active Problem List    Diagnosis Date Noted     Anxiety state 2013     Priority: High      (spontaneous vaginal delivery) 10/09/2016     Priority: Medium     Pregnancy 10/08/2016     Priority: Medium     Personal history of DVT (deep vein thrombosis) 2016     Priority: Medium     DVT and PE on combination oral contraceptive pills in .  Known Factor V Leiden heterozygous.  Dr. Rodas was consulted 10/15/2013 when the patient was seeking fertility assistance, and advised Lovenox 40 mg daily in the first trimester,  "increasing to 40 mg BID in the second and third trimesters, continuing 6 weeks post partum.  Advised induction of labor at 39 weeks, stopping Lovenox the day prior to the scheduled induction.         Supervision of other normal pregnancy, antepartum 03/16/2016     Priority: Medium     Need for Tdap vaccination 02/23/2016     Priority: Medium     Given 07/21/2016       Family history of breast cancer 02/23/2016     Priority: Medium     Family history of pancreatic cancer 12/31/2014     Priority: Medium     Primary female infertility 08/12/2013     Priority: Medium     Pt reports was told she has \"poor ovarian reserve\". Follows BBTs to id possible ovulation.  Pelvic US pending.  ksl       Factor V Leiden (H)      Priority: Medium     Heterozygous.        GERD (gastroesophageal reflux disease)      Priority: Medium     PERTINENT PAST MEDICAL HISTORY:  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 coumadin for a long while     Factor V Leiden (H)     cannot have birth control     GERD (gastroesophageal reflux disease)      Menarche age 14+    cycles q  24-27 x 4-5d      PREVIOUS SURGERIES:  Past Surgical History:   Procedure Laterality Date     HC TOOTH EXTRACTION W/FORCEP       PREVIOUS ENDOSCOPY:  None.    ALLERGIES:  Allergies   Allergen Reactions     Nkda [No Known Drug Allergies]      PERTINENT MEDICATIONS:    Current Outpatient Prescriptions:      albuterol (PROAIR HFA/PROVENTIL HFA/VENTOLIN HFA) 108 (90 Base) MCG/ACT Inhaler, Inhale 2 puffs into the lungs every 6 hours as needed for shortness of breath / dyspnea or wheezing, Disp: 1 Inhaler, Rfl: 0     ondansetron (ZOFRAN ODT) 4 MG ODT tab, Take 1 tablet (4 mg) by mouth 3 times daily (before meals), Disp: 20 tablet, Rfl: 0     Probiotic Product (PROBIOTIC DAILY PO), Reported on 4/20/2017, Disp: , Rfl:      Omega-3 Fatty Acids (OMEGA-3 FISH OIL PO), Take 1 g by mouth daily Reported on 4/20/2017, Disp: , " Rfl:     SOCIAL HISTORY:  Social History     Social History     Marital status: Single     Spouse name: N/A     Number of children: N/A     Years of education: N/A     Occupational History     [employer]     biological science, research     Social History Main Topics     Smoking status: Never Smoker     Smokeless tobacco: Never Used     Alcohol use No     Drug use: No     Sexual activity: Yes     Partners: Male     Birth control/ protection:      Other Topics Concern     Parent/Sibling W/ Cabg, Mi Or Angioplasty Before 65f 55m? No     Social History Narrative    Caffeine intake/servings daily - 0    Calcium intake/servings daily - 3    Exercise 5 times weekly - describe ; cardio, weights, bikes    Sunscreen used - Yes    Seatbelts used - Yes    Guns stored in the home - No    Self Breast Exam - Yes    Pap test up to date -  Due for pap    Eye exam up to date -  Yes    Dental exam up to date -  Yes    DEXA scan up to date -  No    Flex Sig/Colonoscopy up to date -  No    Mammography up to date -  Yes , family history of breast cancer    Immunizations reviewed and up to date - Yes    Abuse: Current or Past (Physical, Sexual or Emotional) - No    Do you feel safe in your environment - Yes    Do you cope well with stress - Yes    Do you suffer from insomnia - No    Last updated by: Kari Lopez  2/23/2016                 FAMILY HISTORY:  Family History   Problem Relation Age of Onset     Thrombophilia Mother      Breast Cancer Mother      Migraines Mother      Genitourinary Problems Mother      GERD Mother      Cancer Father 63     Pancreatic     Breast Cancer Maternal Grandmother 70     Breast Cancer Paternal Grandmother      Uterine Cancer Paternal Grandmother      Chronic Obstructive Pulmonary Disease Paternal Grandfather      Deep Vein Thrombosis Other    Past/family/social history reviewed and no changes    PHYSICAL EXAMINATION:  Constitutional: aaox3, cooperative, pleasant, not  "dyspneic/diaphoretic, no acute distress  Vitals reviewed: /80 (BP Location: Left arm)  Pulse 74  Temp 97.9  F (36.6  C) (Oral)  Ht 1.676 m (5' 6\")  Wt 72.7 kg (160 lb 4.8 oz)  SpO2 100%  BMI 25.87 kg/m2  Wt:   Wt Readings from Last 2 Encounters:   09/28/18 72.7 kg (160 lb 4.8 oz)   09/14/18 72.6 kg (160 lb)      Eyes: Sclera anicteric/injected  Ears/nose/mouth/throat: Normal oropharynx without ulcers or exudate, mucus membranes moist, hearing intact  Neck: supple, thyroid normal size  CV: No edema  Respiratory: Unlabored breathing  Lymph: No cervical lymphadenopathy  Abd: Nondistended, +bs, no hepatosplenomegaly, nontender, no peritoneal signs  Skin: warm, perfused, no jaundice  Psych: Normal affect  MSK: Normal gait    PERTINENT STUDIES:  Office Visit on 09/14/2018   Component Date Value Ref Range Status     WBC 09/14/2018 5.5  4.0 - 11.0 10e9/L Final     RBC Count 09/14/2018 4.76  3.8 - 5.2 10e12/L Final     Hemoglobin 09/14/2018 14.2  11.7 - 15.7 g/dL Final     Hematocrit 09/14/2018 42.4  35.0 - 47.0 % Final     MCV 09/14/2018 89  78 - 100 fl Final     MCH 09/14/2018 29.8  26.5 - 33.0 pg Final     MCHC 09/14/2018 33.5  31.5 - 36.5 g/dL Final     RDW 09/14/2018 13.0  10.0 - 15.0 % Final     Platelet Count 09/14/2018 209  150 - 450 10e9/L Final     Sodium 09/14/2018 138  133 - 144 mmol/L Final     Potassium 09/14/2018 4.2  3.4 - 5.3 mmol/L Final     Chloride 09/14/2018 105  94 - 109 mmol/L Final     Carbon Dioxide 09/14/2018 26  20 - 32 mmol/L Final     Anion Gap 09/14/2018 7  3 - 14 mmol/L Final     Glucose 09/14/2018 93  70 - 99 mg/dL Final     Urea Nitrogen 09/14/2018 12  7 - 30 mg/dL Final     Creatinine 09/14/2018 0.78  0.52 - 1.04 mg/dL Final     GFR Estimate 09/14/2018 81  >60 mL/min/1.7m2 Final     GFR Estimate If Black 09/14/2018 >90  >60 mL/min/1.7m2 Final     Calcium 09/14/2018 9.0  8.5 - 10.1 mg/dL Final     Bilirubin Total 09/14/2018 0.9  0.2 - 1.3 mg/dL Final     Albumin 09/14/2018 4.1  " 3.4 - 5.0 g/dL Final     Protein Total 09/14/2018 8.2  6.8 - 8.8 g/dL Final     Alkaline Phosphatase 09/14/2018 44  40 - 150 U/L Final     ALT 09/14/2018 15  0 - 50 U/L Final     AST 09/14/2018 13  0 - 45 U/L Final     Sed Rate 09/14/2018 8  0 - 20 mm/h Final     Lipase 09/14/2018 158  73 - 393 U/L Final     Beta HCG Qual IFA Urine 09/14/2018 Negative  NEG^Negative    Final     Imaging:  CT A/P w contrast  IMPRESSION  Impression:    Essentially normal CT examination of the abdomen and pelvis.  Answers for HPI/ROS submitted by the patient on 9/21/2018   General Symptoms: No  Skin Symptoms: No  HENT Symptoms: No  EYE SYMPTOMS: No  HEART SYMPTOMS: No  LUNG SYMPTOMS: No  INTESTINAL SYMPTOMS: Yes  URINARY SYMPTOMS: No  GYNECOLOGIC SYMPTOMS: Yes  BREAST SYMPTOMS: No  SKELETAL SYMPTOMS: No  BLOOD SYMPTOMS: No  NERVOUS SYSTEM SYMPTOMS: No  MENTAL HEALTH SYMPTOMS: No  Heart burn or indigestion: Yes  Nausea: Yes  Vomiting: No  Abdominal pain: No  Bloating: No  Constipation: Yes  Diarrhea: Yes  Blood in stool: No  Black stools: No  Rectal or Anal pain: No  Fecal incontinence: No  Yellowing of skin or eyes: No  Vomit with blood: No  Change in stools: Yes  Bleeding or spotting between periods: Yes  Heavy or painful periods: No  Irregular periods: No  Vaginal discharge: No  Hot flashes: No  Vaginal dryness: No  Genital ulcers: No  Reduced libido: No  Painful intercourse: No  Difficulty with sexual arousal: No  Post-menopausal bleeding: No

## 2018-10-16 ENCOUNTER — OFFICE VISIT (OUTPATIENT)
Dept: ONCOLOGY | Facility: CLINIC | Age: 43
End: 2018-10-16
Attending: GENETIC COUNSELOR, MS
Payer: COMMERCIAL

## 2018-10-16 DIAGNOSIS — Z80.3 FAMILY HISTORY OF MALIGNANT NEOPLASM OF BREAST: ICD-10-CM

## 2018-10-16 DIAGNOSIS — Z80.49 FAMILY HISTORY OF UTERINE CANCER: ICD-10-CM

## 2018-10-16 DIAGNOSIS — Z80.0 FAMILY HISTORY OF PANCREATIC CANCER: Primary | ICD-10-CM

## 2018-10-16 PROCEDURE — 96040 ZZH GENETIC COUNSELING, EACH 30 MINUTES: CPT | Mod: ZF | Performed by: GENETIC COUNSELOR, MS

## 2018-10-16 NOTE — LETTER
Cancer Risk Management  Program Locations    [facility]  [facility]  Mailing Address  Cancer Risk Management Program  [facility]  [address]  [place] [address]    New patient appointments  [phone]  October 18, 2018    Shauna ORONA Hernan  [address]  [facility] [address]      Dear Shauna,    It was a pleasure meeting with you at the [facility] on October 16, 2018. Here is a copy of the progress note from your recent genetic counseling visit to the Cancer Risk Management Program. If you have any additional questions, please feel free to call.    10/16/2018    Referring Provider: self-referred    Presenting Information:   I met with Shauna Hernan today for genetic counseling at the Cancer Risk Management Program at the [facility] to discuss her family history of pancreatic, uterine, and breast cancer. She is here today to review this history, cancer screening recommendations, and available genetic testing options.    Personal History:  Shauna is a 43 year old female. She does not have any personal history of cancer.      She had her first menstrual period at age 15, her first child at age 41, and is premenopausal. Shauna has her ovaries, fallopian tubes and uterus in place, and she has had no ovarian cancer screening to date. She reports a 2-3 year history of oral contraceptive use and that she has never used hormone replacement therapy.      Her most recent OB-GYN exam and Pap smear in March 2016 were normal. She has annual clinical breast exams and mammograms; her most recent mammogram in October 2017 was normal. Shauna has not had a colonoscopy. Apart from regular dermatologic exams, she does not regularly do any other cancer screening at this time. Shauna reported  no tobacco use and occasional alcohol use.    Family History: (Please see scanned pedigree for detailed family history information)    Shauna's mother is 69 and was diagnosed with breast cancer at age 67; treatment included a lumpectomy and radiation.    One maternal aunt is 55 and was diagnosed with breast cancer at age 54; treatment is being planned.    Shauna's father was diagnosed with pancreatic cancer at age 63 and passed away at age 64; he had a history of smoking but no significant alcohol use.    Shauna's paternal grandfather was diagnosed with lung and skin cancer and passed away at age 89; he smoked and was exposed to asbestos. His sister was diagnosed with and passed away from pancreatic cancer, possibly in her 50/60's.    Shauna's paternal grandmother was diagnosed with breast cancer at an unknown age, uterine cancer at an unknown age, and pancreatic cancer at age 75 before passing away at age 81. She did not have a history of smoking or significant alcohol use.    Her maternal ethnicity is English and [place]. Her paternal ethnicity is [place] and [place]. There is no known Ashkenazi Nondenominational ancestry on either side of her family. There is no reported consanguinity.    Discussion:    Shauna's family history of pancreatic, uterine, and breast cancer is suggestive of a hereditary cancer syndrome.    We reviewed the features of sporadic, familial, and hereditary cancers. In looking at Shauna's family history, it is possible that a cancer susceptibility gene is present as she has both maternal and paternal relatives diagnosed with related cancers in several generations; her paternal grandmother was also diagnosed with multiple primary cancers.    We discussed the natural history and genetics of several hereditary cancer syndromes, including Hereditary Breast and Ovarian Cancer (HBOC) syndrome and Miranda syndrome. A detailed handout regarding these syndromes and the information we discussed was provided to  Shauna at the end of our appointment today and can be found in the after visit summary.  Topics included: inheritance pattern, cancer risks, cancer screening recommendations, and also risks, benefits and limitations of testing.    We reviewed that one of the most common causes of hereditary pancreatic and breast cancer is HBOC syndrome, which is caused by mutations in the BRCA1 and BRCA2 genes. Individuals with HBOC syndrome are at increased risk for several different cancers, including breast, ovarian, male breast, prostate, melanoma, and pancreatic cancer.    Based on her personal and family history, Shauna meets current National Comprehensive Cancer Network (NCCN) criteria for genetic testing of BRCA1 and BRCA2.    Another potential explanation for her paternal family history is Miranda syndrome, which is caused by mutations in the MLH1, MSH2, MSH6, PMS2, and EPCAM genes. Individuals with Miranda syndrome are at increased risk for several different cancers, including pancreatic, uterine, colon, ovarian, and potentially breast cancer.      We discussed that there are also additional genes that could cause increased risk for the cancers in her family. As many of these genes present with overlapping features in a family and accurate cancer risk cannot always be established based upon the pedigree analysis alone, it would be reasonable for Shauna to consider panel genetic testing to analyze multiple genes at once.    We reviewed genetic testing options for hereditary pancreatic, breast, and gynecologic cancers: actionable high/moderate risk custom panel (CustomNext-Cancer, 28 genes) and expanded high and moderate risk panel (CancerNext, 34 genes).      Shauna expressed interesting in the expanded CancerNext panel, but explained that she would prefer to take some time to review these genetic testing options with her family before having her blood drawn. As such, she was provided an informational handout regarding the  test as well as my contact information.    Shauna was encouraged to contact me with any questions and/or if she wishes to meet again to readdress her genetic testing options. I will also plan to contact Shauna in one week to check-in. She verbalized agreement with this plan.     Medical Management: Cancer screening recommendations for Shauna and her relatives will be discussed either in one week (if she declines genetic testing) or after her genetic testing is completed.    Plan:  1) Today Shauna elected to not have her blood drawn for genetic testing in order to review the genetic testing options with her relatives first.  2) Shauna was provided an informational handout regarding her genetic testing options, as well as my contact information.  3) I will contact Shauna in one week to check-in. She is also encouraged to contact me with any questions and/or if she wishes to readdress genetic testing.    Face to face time: 45 minutes    Ninfa Law MS, [facility]  Licensed Genetic Counselor  Office: [phone]  Pager: [phone]

## 2018-10-16 NOTE — MR AVS SNAPSHOT
After Visit Summary   10/16/2018    Shauna Becerra    MRN: [id number]           Patient Information     Date Of Birth          1975        Visit Information        Provider Department      10/16/2018 12:00 PM Ninfa Law GC;  2 114 CONSULT [facility]        Care Instructions        Assessing Cancer Risk  Only about 5-10% of cancers are thought to be due to an inherited cancer susceptibility gene. These families often have:    Several people with the same or related types of cancer    Cancers diagnosed at a young age (before age 50)    Individuals with more than one primary cancer    Multiple generations of the family affected with cancer    Some people may be candidates for genetic testing of more than one gene.  For these families, genetic testing using a cancer panel may be offered.  These panels can test many genes at once known to increase the risk for pancreatic (and other) cancers: APC, LYNN, BRCA1, BRCA2, CDKN2A, EPCAM, MLH1, MSH2, MSH6, PALB2, PMS2, STK11, and TP53. The purpose of this handout is to serve as a brief summary of the pancreatic cancer risk genes and cancer syndrome with pancreatic cancer risks.     Hereditary Breast and Ovarian Cancer Syndrome  (BRCA1 and BRCA2)    A single mutation in one of the copies of BRCA1 or BRCA2 increases the risk for breast and ovarian cancer.  The risk for pancreatic cancer and melanoma may be slightly increased in some families. BRCA2 is considered the most common gene responsible for familial pancreatic cancer.    A person s ethnic background is also important to consider, as individuals of Ashkenazi [place]vism ancestry have a higher chance of having a BRCA gene mutation.  There are three BRCA mutations that occur more frequently in this population.    Miranda Syndrome  (MLH1, MSH2, MSH6, PMS2, and EPCAM)    Currently five genes are known to cause Miranda Syndrome: MLH1, MSH2, MSH6, PMS2, and EPCAM.  A single  mutation in one of the Miranda Syndrome genes increases the risk for colon, endometrial, ovarian, and stomach cancers.  Other cancers that occur less commonly in Miranda Syndrome include urinary tract cancers, brain cancers, and other cancers.  People who have Miranda Syndrome have up to a 6% risk of pancreatic cancer.     *Cancer risk varies depending on Miranda syndrome gene found    Familial Atypical Multiple Mole Melanoma Syndrome  (CDKN2A)    Familial Atypical Multiple Mole Melanoma Syndrome (FAMMM) is caused by a single mutation in the CDKN2A gene. This gene used to be called p16. The lifetime risk for melanoma is between 58-92%5. People with FAMMM have increased risks for pancreatic cancer, and possibly other cancers.      People with FAMMM typically have many moles (more than 50), which can be atypical.The exact risk of pancreatic cancer can depend on a person s specific CDKN2A mutation. The risk for pancreatic cancer be as high as 60% depending on the mutation.    Peutz-Jeghers Syndrome  (STK11)    Peutz-Jeghers Syndrome is caused by a mutation in the STK11 gene. The main features of Peutz-Jeghers Syndrome are multiple hamartomatous colon polyps and blue pigmentation of the lips and oral mucosa. Cancers associated with Peutz-Jeghers Syndrome include: gastrointestinal, gynecological, lung, breast, and pancreatic cancer. Up to a 36% lifetime risk of developing pancreatic cancer has been reported for those with Peutz-Jeghers syndrome.     Li-Fraumeni Syndrome  (TP53)    Li-Fraumeni Syndrome (LFS) is a cancer predisposition syndrome caused by a mutation in the TP53 gene. A single mutation in one of the copies of TP53 increases the risk for multiple cancers. Individuals with LFS are at an increased risk for developing cancer at a young age. The lifetime risk for development of a LFS-associated cancer is 50% by age 30 and 90% by age 60.      Core Cancers: Sarcomas, Breast, Brain, Lung, Leukemias/Lymphomas, Adrenocortical  carcinomas    Other Cancers: Gastrointestinal (including pancreatic), Thyroid, Skin, Genitourinary    Familial Adenomatous Polyposis Syndrome  (APC)    Familial Adenomatous Polyposis syndrome (FAP) is caused by a single mutation in the APC gene and is characterize by having over 100 adenomatous polyps in the colon and significantly increased risk for colon cancer. These typically appear during adolescence. FAP is associated with other tumors in the thyroid, stomach, and duodenum. People with FAP have an increased lifetime pancreatic cancer risk over the general population.    Additional Genes  PALB2  Mutations in the PALB2 gene have been shown to increase the risk of breast cancer up to 58% in some families. PALB2 mutations have also been associated with increased risk for pancreatic cancer.  Individuals who inherit two PALB2 mutations--one from their mother and one from their father--have a condition called Fanconi Anemia.  This condition is associated with short stature, developmental delay, bone marrow failure, and increased risk for childhood cancers.    LYNN  Mutations in the LYNN gene typically increase the risk of breast cancer 2-4 times higher than an average woman. LYNN mutations have also been associated with an increased risk of pancreatic cancer. People who inherit an LYNN mutation from both their mother and father have a condition called ataxia-telangiectasia. Ataxia telangiectasia is associated with ataxia in childhood (trouble with balance and walking), telangiectasias (red or purple spotty clusters on the skin), involuntary movements, frequent infections, and increased risk of leukemia and lymphoma.     Inheritance    All of the genes reviewed above are inherited in an autosomal dominant pattern.  This means that if a parent has a mutation, each of their children will have a 50% chance of inheriting that same mutation.  Every child--male or female--would have a 50% chance of inheriting the mutation and  being at increased risk for developing cancer.       https://r.nlm.nih.gov/primer/inheritance/inheritancepatterns    Mutations in some genes can occur de christophe, which means that a person s mutation occurred for the first time in them and was not inherited from a parent.  Now that they have the mutation, however, it can be passed on to future generations.     Genetic Testing  Genetic testing involves a blood test and will look for any harmful mutations that are associated with increased cancer risk.  If possible, it is recommended that the person(s) who has had cancer be tested before other family members.  That person will give us the most useful information about whether or not a specific gene is associated with the cancer in the family.    Advantages and Disadvantages   There are advantages and disadvantages to genetic testing.  Advantages    May clarify your cancer risk and additional appropriate cancer screenings     Can help you make medical decisions    May explain the cancers in your family    May give useful information to your family members (if you share your results)     Disadvantages    Possible negative emotional impact of learning about inherited cancer risk    Uncertainty in interpreting a negative test result in some situations    Possible genetic discrimination concerns (see below)     Genetic Information Nondiscrimination Act (LAYLA)  LAYLA is a federal law that protects individuals from health insurance or employment discrimination based on a genetic test result.  There are currently no legal discrimination protections in terms of life insurance, long term care, or disability insurances.  Visit the National Human Genome Research [place] website to learn more: https://www.genome.gov/93821151/genetic-discrimination/    Questions to Think About Regarding Genetic Testing:    What effect will the test result have on me and my relationship with my family members if I have an inherited gene mutation?   If I don t have a gene mutation?    Should I share my test results, and how will my family react to this news, which may also affect them?    Are my children ready to learn new information that may one day affect their own health?    There are three possible results of genetic testing:    Positive--a harmful mutation was identified in one or more of the genes    Negative--no mutation was identified in any of the genes on this panel    Variant of unknown significance (VUS)--a variation in one of the genes was identified, but it is unclear how this impacts cancer risk in the family  o Families with VUS results can contact their genetic counselor annually for updates     Reducing Cancer Risk  Recommendations are based upon an individual s genetic test result as well as their personal and family history of cancer. Pancreatic cancer screening may be available based on family history. Talk with your physician about screening options.     Cancer Resources      National Pancreatic Cancer Foundation: npcf.     Pancreatic Cancer Action Network: pancan.org     FORCE: Facing Our Risk of Cancer Empowered: facingourNextance.org    Bright [place]: bebrightpink.org    Li-Fraumeni Syndrome Association: lfsassociation.org    Collaborative Group of the Americas on Inherited Colorectal Cancer (CGA)   o cgaicc.com http://www.facingourrisk.org/    Cancer Care: cancercare.org    American Cancer Society (ACS): cancer.org    National Cancer [place] (NCI): cancer.gov      Please call us if you have any questions or concerns.   Cancer Risk Management Program [phone]-CANCER ([phone])  ? Lynda Barajas, MS, [facility]  [phone]  ? Darya Hoyos, MS, [facility]  [phone]  ? Leeann Zimmer, MS, [facility]  [phone]  ? Brianna Talley, MS, [facility]  [phone]  ? Ninfa Law, MS, [facility] [phone]     References  1. Genetic / Familial High-Risk Assessment?: Breast and Ovarian. NCCN Pract Guidel Oncol. 2017;1.2018.  2. Ramez SANCHES, Juan R GILLIS, Diana SANCHES, et al. The  incidence of pancreatic cancer in BRCA1 and BRCA2 mutation carriers. Br J Cancer. 2012;107(12):9171-9768. doi:10.1038/bjc.2012.483.  3. Dima DUMONT, Ector KG, Sonia S, et al. BRCA1, BRCA2, PALB2, and CDKN2A mutations in familial pancreatic cancer: a PACGENE study. Mandie Med. 2015;17(7):569-577. doi:10.1038/gim.2014.153.  4. [place] G. Genetic / Familial High-Risk Assessment?: Colorectal. NCCN Pract Guidel Oncol. 2017;2.2017.  5. Robert Gregory D,  M, Timothy E, Chyna J. Familial Atypical Multiple Mole Melanoma Syndrome. National Center for Sweet P's Information (); 2009. http://www.ncbi.nlm.nih.gov/pubmed/77919358. Accessed October 10, 2017.  6. Miranda HT, Fuslaly RM, Juanito J, et al. Tumour spectrum in the FAMMM syndrome. Br J Cancer. 1981;44(4):553-560. http://www.ncbi.nlm.nih.gov/pubmed/2720867. Accessed October 10, 2017.  7. Raquel F, Werner J, Yemi A, et al. Very high risk of cancer in familial Peutz-Jeghers syndrome. Gastroenterology. 2000;119(6):5113-3747. doi:10.1053/SOPHIA.2000.96468.  8. Raquel FM, Offerhaus GJ, Mayank DH, et al. Increased risk of thyroid and pancreatic carcinoma in familial adenomatous polyposis. Gut. 1993;34(10):8094-7438. doi:10.1136/GUT.34.10.1394.          Follow-ups after your visit        Your next 10 appointments already scheduled     Oct 16, 2018  1:15 PM CDT   [facility] Lab Draw with  MASONIC LAB DRAW   [facility] Lab Draw ([facility])    [address] [place] [address]  [address]  [facility] [address]   [phone]            Oct 18, 2018  9:30 AM CDT   MA SCREENING DIGITAL BILATERAL with [id number]   [facility] ([facility])    [address]  [facility] [address] [phone]           How do I prepare for my exam? (Food and drink instructions) No Food and Drink Restrictions.  How do I prepare for my exam? (Other instructions) Do not use any powder, lotion or  "deodorant under your arms or on your breast. If you do, we will ask you to remove it before your exam.  What should I wear: Wear comfortable, two-piece clothing.  How long does the exam take: Most scans will take 15 minutes.  What should I bring: Bring any previous mammograms from other facilities or have them mailed to the breast center.  Do I need a :  No  is needed.  What do I need to tell my doctor: If you have any allergies, tell your care team.  What should I do after the exam: No restrictions, You may resume normal activities.  What is this test: This test is an x-ray of the breast to look for breast disease. The breast is pressed between two plates to flatten and spread the tissue. An X-ray is taken of the breast from different angles.  Who should I call with questions: If you have any questions, please call the Imaging Department where you will have your exam. Directions, parking instructions, and other information is available on our website, [email or web address].  Other information about my exam Three-dimensional (3D) mammograms are available at [place] locations in [facility], [facility], [facility], [place], [facility] and [place]. [facility] locations include [place] and the [facility] in [place].  Benefits of 3D mammograms include * Improved rate of cancer detection * Decreases your chance of having to go back for more tests, which means fewer: * \"False-positive\" results (This means that there is an abnormal area but it isn't cancer.) * Invasive testing procedures, such as a biopsy or surgery * Can provide clearer images of the breast if you have dense breast tissue.  *3D mammography is an optional exam that anyone can have with a 2D mammogram. It doesn't replace or take the place of a 2D mammogram. 2D mammograms remain an effective screening test for all women.  Not all insurance companies cover the cost of a 3D mammogram. Check with your " "insurance. Three-dimensional (3D) mammograms are available at [place] locations in [place], [place], [place], [place], [facility], [place], [place], and [place]. [facility] locations include [place] and [facility] in [place]. Benefits of 3D mammograms include: - Improved rate of cancer detection - Decreases your chance of having to go back for more tests, which means fewer: - \"False-positive\" results (This means that there is an abnormal area but it isn't cancer.) - Invasive testing procedures, such as a biopsy or surgery - Can provide clearer images of the breast if you have dense breast tissue. 3D mammography is an optional exam that anyone can have with a 2D mammogram. It doesn't replace or take the place of a 2D mammogram. 2D mammograms remain an effective screening test for all women.  Not all insurance companies cover the cost of a 3D mammogram. Check with your insurance.              Who to contact     If you have questions or need follow up information about today's clinic visit or your schedule please contact [facility] directly at [phone].  Normal or non-critical lab and imaging results will be communicated to you by [employer], letter or phone within 4 business days after the clinic has received the results. If you do not hear from us within 7 days, please contact the clinic through [employer] or phone. If you have a critical or abnormal lab result, we will notify you by phone as soon as possible.  Submit refill requests through [employer] or call your pharmacy and they will forward the refill request to us. Please allow 3 business days for your refill to be completed.          Additional Information About Your Visit        [employer] Information     [employer] gives you secure access to your electronic health record. If you see a primary care provider, you can also send messages to your care team and make appointments. If you have questions, please call your primary " [facility] clinic.  If you do not have a primary care provider, please call [phone] and they will assist you.        Care EveryWhere ID     This is your Care EveryWhere ID. This could be used by other organizations to access your [place] medical records  [id number]         Blood Pressure from Last 3 Encounters:   09/28/18 128/80   09/14/18 116/75   04/30/18 112/75    Weight from Last 3 Encounters:   09/28/18 72.7 kg (160 lb 4.8 oz)   09/14/18 72.6 kg (160 lb)   04/30/18 78.1 kg (172 lb 4 oz)              Today, you had the following     No orders found for display       Primary Care Provider Office Phone # Fax #    Evelin THIAGO Zamarripa [phone] [address]  [facility] [address]        Equal Access to Services     [facility]: Hadii aad ku hadasho Soomaali, waaxda luqadaha, qaybta kaalmada adeegyada, arianne mirelesin hayojhny rice . So [facility] [phone].    ATENCIÓN: Si habla español, tiene a mejia disposición servicios gratuitos de asistencia lingüística. Llame al [phone].    We comply with applicable federal civil rights laws and [place] laws. We do not discriminate on the basis of race, color, national origin, age, disability, sex, sexual orientation, or gender identity.            Thank you!     Thank you for choosing [facility]  for your care. Our goal is always to provide you with excellent care. Hearing back from our patients is one way we can continue to improve our services. Please take a few minutes to complete the written survey that you may receive in the mail after your visit with us. Thank you!             Your Updated Medication List - Protect others around you: Learn how to safely use, store and throw away your medicines at www.disposemymeds.org.          This list is accurate as of 10/16/18  1:06 PM.  Always use your most recent med list.                   Brand Name Dispense Instructions for use Diagnosis    albuterol 108  (90 Base) MCG/ACT inhaler    PROAIR HFA/PROVENTIL HFA/VENTOLIN HFA    1 Inhaler    Inhale 2 puffs into the lungs every 6 hours as needed for shortness of breath / dyspnea or wheezing    Acute bronchitis, unspecified organism       OMEGA-3 FISH OIL PO      Take 1 g by mouth daily Reported on 4/20/2017        ondansetron 4 MG ODT tab    ZOFRAN ODT    20 tablet    Take 1 tablet (4 mg) by mouth 3 times daily (before meals)    Nausea       PROBIOTIC DAILY PO      Reported on 4/20/2017

## 2018-10-16 NOTE — PATIENT INSTRUCTIONS
Assessing Cancer Risk  Only about 5-10% of cancers are thought to be due to an inherited cancer susceptibility gene. These families often have:    Several people with the same or related types of cancer    Cancers diagnosed at a young age (before age 50)    Individuals with more than one primary cancer    Multiple generations of the family affected with cancer    Some people may be candidates for genetic testing of more than one gene.  For these families, genetic testing using a cancer panel may be offered.  These panels can test many genes at once known to increase the risk for pancreatic (and other) cancers: APC, LYNN, BRCA1, BRCA2, CDKN2A, EPCAM, MLH1, MSH2, MSH6, PALB2, PMS2, STK11, and TP53. The purpose of this handout is to serve as a brief summary of the pancreatic cancer risk genes and cancer syndrome with pancreatic cancer risks.     Hereditary Breast and Ovarian Cancer Syndrome  (BRCA1 and BRCA2)    A single mutation in one of the copies of BRCA1 or BRCA2 increases the risk for breast and ovarian cancer.  The risk for pancreatic cancer and melanoma may be slightly increased in some families. BRCA2 is considered the most common gene responsible for familial pancreatic cancer.    A person s ethnic background is also important to consider, as individuals of Ashkenazi Mormonism ancestry have a higher chance of having a BRCA gene mutation.  There are three BRCA mutations that occur more frequently in this population.    Miranda Syndrome  (MLH1, MSH2, MSH6, PMS2, and EPCAM)    Currently five genes are known to cause Miranda Syndrome: MLH1, MSH2, MSH6, PMS2, and EPCAM.  A single mutation in one of the Miranda Syndrome genes increases the risk for colon, endometrial, ovarian, and stomach cancers.  Other cancers that occur less commonly in Miranda Syndrome include urinary tract cancers, brain cancers, and other cancers.  People who have Miranda Syndrome have up to a 6% risk of pancreatic cancer.     *Cancer risk varies  depending on Miranda syndrome gene found    Familial Atypical Multiple Mole Melanoma Syndrome  (CDKN2A)    Familial Atypical Multiple Mole Melanoma Syndrome (FAMMM) is caused by a single mutation in the CDKN2A gene. This gene used to be called p16. The lifetime risk for melanoma is between 58-92%5. People with FAMMM have increased risks for pancreatic cancer, and possibly other cancers.      People with FAMMM typically have many moles (more than 50), which can be atypical.The exact risk of pancreatic cancer can depend on a person s specific CDKN2A mutation. The risk for pancreatic cancer be as high as 60% depending on the mutation.    Peutz-Jeghers Syndrome  (STK11)    Peutz-Jeghers Syndrome is caused by a mutation in the STK11 gene. The main features of Peutz-Jeghers Syndrome are multiple hamartomatous colon polyps and blue pigmentation of the lips and oral mucosa. Cancers associated with Peutz-Jeghers Syndrome include: gastrointestinal, gynecological, lung, breast, and pancreatic cancer. Up to a 36% lifetime risk of developing pancreatic cancer has been reported for those with Peutz-Jeghers syndrome.     Li-Fraumeni Syndrome  (TP53)    Li-Fraumeni Syndrome (LFS) is a cancer predisposition syndrome caused by a mutation in the TP53 gene. A single mutation in one of the copies of TP53 increases the risk for multiple cancers. Individuals with LFS are at an increased risk for developing cancer at a young age. The lifetime risk for development of a LFS-associated cancer is 50% by age 30 and 90% by age 60.      Core Cancers: Sarcomas, Breast, Brain, Lung, Leukemias/Lymphomas, Adrenocortical carcinomas    Other Cancers: Gastrointestinal (including pancreatic), Thyroid, Skin, Genitourinary    Familial Adenomatous Polyposis Syndrome  (APC)    Familial Adenomatous Polyposis syndrome (FAP) is caused by a single mutation in the APC gene and is characterize by having over 100 adenomatous polyps in the colon and significantly  increased risk for colon cancer. These typically appear during adolescence. FAP is associated with other tumors in the thyroid, stomach, and duodenum. People with FAP have an increased lifetime pancreatic cancer risk over the general population.    Additional Genes  PALB2  Mutations in the PALB2 gene have been shown to increase the risk of breast cancer up to 58% in some families. PALB2 mutations have also been associated with increased risk for pancreatic cancer.  Individuals who inherit two PALB2 mutations--one from their mother and one from their father--have a condition called Fanconi Anemia.  This condition is associated with short stature, developmental delay, bone marrow failure, and increased risk for childhood cancers.    LYNN  Mutations in the LYNN gene typically increase the risk of breast cancer 2-4 times higher than an average woman. LYNN mutations have also been associated with an increased risk of pancreatic cancer. People who inherit an LYNN mutation from both their mother and father have a condition called ataxia-telangiectasia. Ataxia telangiectasia is associated with ataxia in childhood (trouble with balance and walking), telangiectasias (red or purple spotty clusters on the skin), involuntary movements, frequent infections, and increased risk of leukemia and lymphoma.     Inheritance    All of the genes reviewed above are inherited in an autosomal dominant pattern.  This means that if a parent has a mutation, each of their children will have a 50% chance of inheriting that same mutation.  Every child--male or female--would have a 50% chance of inheriting the mutation and being at increased risk for developing cancer.       https://r.nlm.nih.gov/primer/inheritance/inheritancepatterns    Mutations in some genes can occur de christophe, which means that a person s mutation occurred for the first time in them and was not inherited from a parent.  Now that they have the mutation, however, it can be passed on  to future generations.     Genetic Testing  Genetic testing involves a blood test and will look for any harmful mutations that are associated with increased cancer risk.  If possible, it is recommended that the person(s) who has had cancer be tested before other family members.  That person will give us the most useful information about whether or not a specific gene is associated with the cancer in the family.    Advantages and Disadvantages   There are advantages and disadvantages to genetic testing.  Advantages    May clarify your cancer risk and additional appropriate cancer screenings     Can help you make medical decisions    May explain the cancers in your family    May give useful information to your family members (if you share your results)     Disadvantages    Possible negative emotional impact of learning about inherited cancer risk    Uncertainty in interpreting a negative test result in some situations    Possible genetic discrimination concerns (see below)     Genetic Information Nondiscrimination Act (LAYLA)  LAYLA is a federal law that protects individuals from health insurance or employment discrimination based on a genetic test result.  There are currently no legal discrimination protections in terms of life insurance, long term care, or disability insurances.  Visit the National Human Genome Research [place] website to learn more: https://www.genome.gov/92741673/genetic-discrimination/    Questions to Think About Regarding Genetic Testing:    What effect will the test result have on me and my relationship with my family members if I have an inherited gene mutation?  If I don t have a gene mutation?    Should I share my test results, and how will my family react to this news, which may also affect them?    Are my children ready to learn new information that may one day affect their own health?    There are three possible results of genetic testing:    Positive--a harmful mutation was identified in  one or more of the genes    Negative--no mutation was identified in any of the genes on this panel    Variant of unknown significance (VUS)--a variation in one of the genes was identified, but it is unclear how this impacts cancer risk in the family  o Families with VUS results can contact their genetic counselor annually for updates     Reducing Cancer Risk  Recommendations are based upon an individual s genetic test result as well as their personal and family history of cancer. Pancreatic cancer screening may be available based on family history. Talk with your physician about screening options.     Cancer Resources      National Pancreatic Cancer Foundation: npcf.     Pancreatic Cancer Action Network: pancan.org     FORCE: Facing Our Risk of Cancer Empowered: facingParsok.org    [place]: YoBuckok.org    Li-Fraumeni Syndrome Association: lfsassociation.org    Collaborative Group of the Americas on Inherited Colorectal Cancer (CGA)   o cgaicc.com http://www.facingKlickEx.org/    Cancer Care: cancercare.org    American Cancer Society (ACS): cancer.org    National Cancer [place] (NCI): cancer.gov      Please call us if you have any questions or concerns.   Cancer Risk Management Program [phone]-CANCER ([phone])  ? Lynda Barajas, MS, [facility]  [phone]  ? Darya Hoyos, MS, [facility]  [phone]  ? Leeann Zimmer, MS, [facility]  [phone]  ? Brianna Talley, MS, [facility]  [phone]  ? Ninfa Law, MS, [facility] [phone]     References  1. Genetic / Familial High-Risk Assessment?: Breast and Ovarian. NCCN Pract Guidel Oncol. 2017;1.2018.  2. Ramez J, Juan R A, Diana J, et al. The incidence of pancreatic cancer in BRCA1 and BRCA2 mutation carriers. Br J Cancer. 2012;107(12):4412-8532. doi:10.1038/bjc.2012.483.  3. Dima DUMONT, Ector KG, Sonia S, et al. BRCA1, BRCA2, PALB2, and CDKN2A mutations in familial pancreatic cancer: a PACGENE study. Mandie Med. 2015;17(7):569-577. doi:10.1038/gim.2014.153.  4. [place] DAI. Genetic  / Familial High-Risk Assessment?: Colorectal. NCCN Pract Guidel Oncol. 2017;2.2017.  5. Robert CASTAÑEDA,  M, Timothy E, Chyna J. Familial Atypical Multiple Mole Melanoma Syndrome. National Center for Biotechnology Information ([place]); 2009. http://www.ncbi.nlm.nih.gov/pubmed/49195890. Accessed October 10, 2017.  6. Miranda HT, Fuslaly RM, Juanito J, et al. Tumour spectrum in the FAMMM syndrome. Br J Cancer. 1981;44(4):553-560. http://www.ncbi.nlm.nih.gov/pubmed/3480010. Accessed October 10, 2017.  7. Raquel F, Werner J, Yemi A, et al. Very high risk of cancer in familial Peutz-Jeghers syndrome. Gastroenterology. 2000;119(6):0281-1457. doi:10.1053/SOPHIA.2000.04090.  8. Raquel WEBER, Offerhaus GJ, Mayank DH, et al. Increased risk of thyroid and pancreatic carcinoma in familial adenomatous polyposis. Gut. 1993;34(10):5177-2284. doi:10.1136/GUT.34.10.1394.

## 2018-10-18 ENCOUNTER — RADIANT APPOINTMENT (OUTPATIENT)
Dept: MAMMOGRAPHY | Facility: CLINIC | Age: 43
End: 2018-10-18

## 2018-10-18 DIAGNOSIS — Z00.00 PREVENTATIVE HEALTH CARE: ICD-10-CM

## 2018-10-18 NOTE — PROGRESS NOTES
10/16/2018    Referring Provider: self-referred    Presenting Information:   I met with Shauna Becerra today for genetic counseling at the Cancer Risk Management Program at the [facility] to discuss her family history of pancreatic, uterine, and breast cancer. She is here today to review this history, cancer screening recommendations, and available genetic testing options.    Personal History:  Shauna is a 43 year old female. She does not have any personal history of cancer.      She had her first menstrual period at age 15, her first child at age 41, and is premenopausal. Shauna has her ovaries, fallopian tubes and uterus in place, and she has had no ovarian cancer screening to date. She reports a 2-3 year history of oral contraceptive use and that she has never used hormone replacement therapy.      Her most recent OB-GYN exam and Pap smear in March 2016 were normal. She has annual clinical breast exams and mammograms; her most recent mammogram in October 2017 was normal. Shauna has not had a colonoscopy. Apart from regular dermatologic exams, she does not regularly do any other cancer screening at this time. Shauna reported no tobacco use and occasional alcohol use.    Family History: (Please see scanned pedigree for detailed family history information)    Shauna's mother is 69 and was diagnosed with breast cancer at age 67; treatment included a lumpectomy and radiation.    One maternal aunt is 55 and was diagnosed with breast cancer at age 54; treatment is being planned.    Shauna's father was diagnosed with pancreatic cancer at age 63 and passed away at age 64; he had a history of smoking but no significant alcohol use.    Shauna's paternal grandfather was diagnosed with lung and skin cancer and passed away at age 89; he smoked and was exposed to asbestos. His sister was diagnosed with and passed away from pancreatic cancer, possibly in her 50/60's.    Shauna's paternal grandmother was  diagnosed with breast cancer at an unknown age, uterine cancer at an unknown age, and pancreatic cancer at age 75 before passing away at age 81. She did not have a history of smoking or significant alcohol use.    Her maternal ethnicity is English and [place]. Her paternal ethnicity is [place] and [place]. There is no known Ashkenazi Congregational ancestry on either side of her family. There is no reported consanguinity.    Discussion:    Shauna's family history of pancreatic, uterine, and breast cancer is suggestive of a hereditary cancer syndrome.    We reviewed the features of sporadic, familial, and hereditary cancers. In looking at Shauna's family history, it is possible that a cancer susceptibility gene is present as she has both maternal and paternal relatives diagnosed with related cancers in several generations; her paternal grandmother was also diagnosed with multiple primary cancers.    We discussed the natural history and genetics of several hereditary cancer syndromes, including Hereditary Breast and Ovarian Cancer (HBOC) syndrome and Miranda syndrome. A detailed handout regarding these syndromes and the information we discussed was provided to Shauna at the end of our appointment today and can be found in the after visit summary.  Topics included: inheritance pattern, cancer risks, cancer screening recommendations, and also risks, benefits and limitations of testing.    We reviewed that one of the most common causes of hereditary pancreatic and breast cancer is HBOC syndrome, which is caused by mutations in the BRCA1 and BRCA2 genes. Individuals with HBOC syndrome are at increased risk for several different cancers, including breast, ovarian, male breast, prostate, melanoma, and pancreatic cancer.    Based on her personal and family history, Shauna meets current National Comprehensive Cancer Network (NCCN) criteria for genetic testing of BRCA1 and BRCA2.    Another potential explanation for her paternal family  history is Miranda syndrome, which is caused by mutations in the MLH1, MSH2, MSH6, PMS2, and EPCAM genes. Individuals with Miranda syndrome are at increased risk for several different cancers, including pancreatic, uterine, colon, ovarian, and potentially breast cancer.      We discussed that there are also additional genes that could cause increased risk for the cancers in her family. As many of these genes present with overlapping features in a family and accurate cancer risk cannot always be established based upon the pedigree analysis alone, it would be reasonable for Shauna to consider panel genetic testing to analyze multiple genes at once.    We reviewed genetic testing options for hereditary pancreatic, breast, and gynecologic cancers: actionable high/moderate risk custom panel (CustomNext-Cancer, 28 genes) and expanded high and moderate risk panel (CancerNext, 34 genes).      Shauna expressed interesting in the expanded CancerNext panel, but explained that she would prefer to take some time to review these genetic testing options with her family before having her blood drawn. As such, she was provided an informational handout regarding the test as well as my contact information.    Shauna was encouraged to contact me with any questions and/or if she wishes to meet again to readdress her genetic testing options. I will also plan to contact Shauna in one week to check-in. She verbalized agreement with this plan.     Medical Management: Cancer screening recommendations for Shauna and her relatives will be discussed either in one week (if she declines genetic testing) or after her genetic testing is completed.    Plan:  1) Today Shauna elected to not have her blood drawn for genetic testing in order to review the genetic testing options with her relatives first.  2) Shauna was provided an informational handout regarding her genetic testing options, as well as my contact information.  3) I will contact  Shauna in one week to check-in. She is also encouraged to contact me with any questions and/or if she wishes to readdress genetic testing.    Face to face time: 45 minutes    Ninfa Law MS, [facility]  Licensed Genetic Counselor  Office: [phone]  Pager: [phone]

## 2018-10-31 ENCOUNTER — TELEPHONE (OUTPATIENT)
Dept: ONCOLOGY | Facility: CLINIC | Age: 43
End: 2018-10-31

## 2018-10-31 NOTE — TELEPHONE ENCOUNTER
10/31/2018    I called Shauna today to follow-up after our visit on 10/16/2018, but was unable to reach her. I left a non-detailed voicemail with my name and phone number.    Ninfa Law MS, [facility]  Licensed Genetic Counselor  Office: [phone]  Pager: [phone]

## 2018-12-19 ENCOUNTER — OFFICE VISIT (OUTPATIENT)
Dept: FAMILY MEDICINE | Facility: CLINIC | Age: 43
End: 2018-12-19
Payer: COMMERCIAL

## 2018-12-19 VITALS
SYSTOLIC BLOOD PRESSURE: 118 MMHG | WEIGHT: 164 LBS | OXYGEN SATURATION: 100 % | DIASTOLIC BLOOD PRESSURE: 74 MMHG | TEMPERATURE: 98.4 F | BODY MASS INDEX: 26.47 KG/M2 | HEART RATE: 68 BPM

## 2018-12-19 DIAGNOSIS — B00.1 COLD SORE: Primary | ICD-10-CM

## 2018-12-19 PROCEDURE — 99213 OFFICE O/P EST LOW 20 MIN: CPT | Performed by: FAMILY MEDICINE

## 2018-12-19 RX ORDER — VALACYCLOVIR HYDROCHLORIDE 1 G/1
2000 TABLET, FILM COATED ORAL 2 TIMES DAILY
Qty: 4 TABLET | Refills: 0 | Status: SHIPPED | OUTPATIENT
Start: 2018-12-19 | End: 2018-12-21

## 2018-12-19 RX ORDER — PRENATAL VIT/IRON FUM/FOLIC AC 27MG-0.8MG
1 TABLET ORAL DAILY
COMMUNITY
End: 2020-06-02

## 2018-12-19 NOTE — PROGRESS NOTES
SUBJECTIVE:   Shauna Becerra is a 43 year old female who presents to clinic today for the following health issues:      Cold sore on bottom left lip. Yesterday afternoon she had some itching and then felt a bump in the afternoon and then noticed a lesion similar to cold sore. She has been using abreva.     Problem list and histories reviewed & adjusted, as indicated.  Additional history: as documented    Labs reviewed in EPIC    Reviewed and updated as needed this visit by clinical staff       Reviewed and updated as needed this visit by Provider         ROS:  Constitutional, HEENT, cardiovascular, pulmonary, gi and gu systems are negative, except as otherwise noted.    OBJECTIVE:     /74   Pulse 68   Temp 98.4  F (36.9  C) (Oral)   Wt 74.4 kg (164 lb)   LMP 12/02/2018 (Exact Date)   SpO2 100%   Breastfeeding? No   BMI 26.47 kg/m    Body mass index is 26.47 kg/m .  GENERAL: healthy, alert and no distress  EYES: Eyes grossly normal to inspection  HENT: lower lip with swelling on the left side, deroofed vesicle on the left lower lip     Diagnostic Test Results:  none     ASSESSMENT/PLAN:     1. Cold sore  - valACYclovir (VALTREX) 1000 mg tablet; Take 2 tablets (2,000 mg) by mouth 2 times daily for 1 day  Dispense: 4 tablet; Refill: 0  - Follow if symptoms worsen or fail to improve.    Kate Teran MD  [facility]

## 2018-12-21 ENCOUNTER — MYC MEDICAL ADVICE (OUTPATIENT)
Dept: FAMILY MEDICINE | Facility: CLINIC | Age: 43
End: 2018-12-21

## 2018-12-21 DIAGNOSIS — B00.1 COLD SORE: ICD-10-CM

## 2018-12-21 RX ORDER — VALACYCLOVIR HYDROCHLORIDE 1 G/1
2000 TABLET, FILM COATED ORAL 2 TIMES DAILY
Qty: 4 TABLET | Refills: 0 | Status: SHIPPED | OUTPATIENT
Start: 2018-12-21 | End: 2018-12-24

## 2018-12-24 RX ORDER — ACYCLOVIR 50 MG/G
CREAM TOPICAL
Qty: 5 G | Refills: 3 | Status: SHIPPED | OUTPATIENT
Start: 2018-12-24 | End: 2018-12-31

## 2018-12-24 RX ORDER — ACYCLOVIR 50 MG/G
OINTMENT TOPICAL
Qty: 30 G | Refills: 3 | Status: SHIPPED | OUTPATIENT
Start: 2018-12-24 | End: 2018-12-31

## 2018-12-24 RX ORDER — VALACYCLOVIR HYDROCHLORIDE 1 G/1
2000 TABLET, FILM COATED ORAL 2 TIMES DAILY
Qty: 4 TABLET | Refills: 3 | Status: SHIPPED | OUTPATIENT
Start: 2018-12-24 | End: 2019-10-08

## 2018-12-24 NOTE — TELEPHONE ENCOUNTER
Dr. Wegener-Please review and advise if Valtrex was meant to have any refills?    Acyclovir cream pended.    Thank you!  CUAUHTEMOC Rivera, BSN, RN

## 2018-12-26 ENCOUNTER — TELEPHONE (OUTPATIENT)
Dept: FAMILY MEDICINE | Facility: CLINIC | Age: 43
End: 2018-12-26

## 2018-12-26 NOTE — TELEPHONE ENCOUNTER
Prior Authorization Retail Medication Request    Medication/Dose: Zovirax 5% Cream  ICD code (if different than what is on RX):  Previously Tried and Failed:  Rationale:    Insurance Name:Unknown  Insurance ID: Unknown    Pharmacy Information (if different than what is on RX)  [facility]  Phone: [phone]     Please include previous medications tried and failed.  Please ask insurance for medications on formulary.

## 2018-12-27 NOTE — TELEPHONE ENCOUNTER
Central Prior Authorization Team   Phone: [phone]      PA Initiation    Medication: Zovirax 5% Cream-Initiated  Insurance Company: [email or web address] - Phone [phone] Fax [phone]  [id number] [place] [facility] [address] [place] [address] [address]  Filling Pharmacy Phone: [phone]  Filling Pharmacy Fax:    Start Date: 12/27/2018

## 2018-12-27 NOTE — TELEPHONE ENCOUNTER
Please inform pt insurance is not covering this medication, she can call them to see if there is alternative they cover.    Evelin Tellez, CNP

## 2018-12-27 NOTE — TELEPHONE ENCOUNTER
I have attempted to contact this patient by phone with the following results: pt state she understand and everything is taken care of now, state she got alternative medication that was covered.         Rachel Lazo MA

## 2018-12-27 NOTE — TELEPHONE ENCOUNTER
PA was denied. Please order alternative med with complete SIG or begin appeal process.     If you would like to appeal:   Create letter of medical necessity or    Compile supporting clinical documentation in EPIC Telephone encounter (TE).    Route TE to: [facility].

## 2018-12-27 NOTE — TELEPHONE ENCOUNTER
PRIOR AUTHORIZATION DENIED    Medication: Zovirax 5% Cream-DENIED    Denial Date: 12/27/2018    Denial Rational: For zovirax cream to be covered, patient needs to have an inadequate response to 3 preferred alternatives (OTC Abreva, oral acyclovir or valacyclovir, and acyclovir ointment)        Appeal Information:     If provider would like to appeal please provide a letter of medical necessity stating why formulary alternatives would not be clinically appropriate for patient and route back to the PA team.

## 2019-10-04 ASSESSMENT — ENCOUNTER SYMPTOMS
WEAKNESS: 0
NERVOUS/ANXIOUS: 0
MYALGIAS: 0
ARTHRALGIAS: 0
FEVER: 0
SORE THROAT: 0
HEADACHES: 0
DYSURIA: 0
DIARRHEA: 0
HEMATURIA: 0
EYE PAIN: 0
SHORTNESS OF BREATH: 0
COUGH: 0
HEARTBURN: 0
DIZZINESS: 0
BREAST MASS: 0
PARESTHESIAS: 0
FREQUENCY: 0
JOINT SWELLING: 0
PALPITATIONS: 0
ABDOMINAL PAIN: 0
HEMATOCHEZIA: 0
NAUSEA: 0
CONSTIPATION: 0
CHILLS: 0

## 2019-10-07 NOTE — PROGRESS NOTES
SUBJECTIVE:   CC: Shauna Becerra is an 44 year old woman who presents for preventive health visit.     Healthy Habits:     Getting at least 3 servings of Calcium per day:  NO    Bi-annual eye exam:  NO    Dental care twice a year:  Yes    Sleep apnea or symptoms of sleep apnea:  None    Diet:  Regular (no restrictions)    Frequency of exercise:  1 day/week    Duration of exercise:  15-30 minutes    Taking medications regularly:  Yes    Medication side effects:  None    PHQ-2 Total Score: 0    Additional concerns today:  No      Acute concerns:  none    Chronic problems:    Anxiety- Worsened over this last year due to increased stress. Lost job in Jan, new job in July that has required her to increase her travel. Her job title may also be changing as her company was acquired by another. She meditates.  Has seen therapy in the past. No history of medication use. Does not consistently exercise due to time.      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   Saw heme 2017, recommended avoid estrogen for contraception, should be anticoagulated for surgery, immobility, pregnancy.  Asked hematology about aspirin for travel, recommended against due to GERD and lack of evidence. No new DVTs since 2005. Is having to travel more for work, ~ twice a month for 3 hours at a time. Is not wearing Jobst stockings, but recognizes that she should.       Asthma during pregnancy: was diagnosed with mild asthma during pregnancy via spirometry testing. Symptoms of shortness of breath and wheezing subsided after delivery. Did use albuterol inhaler a couple of times last year when she had a viral URI and some SOB with that, otherwise is not using on a regular basis and does not have symptoms of asthma currently, I.e. no SOB, wheezing, or cough.        Oral HSV- takes valtrex as needed and topical antiviral. States both are effective. Is needing a refill of valtrex to have on hand but does  not use often.     Health Screening:  Pap- no history of abnormal pap, no HPV testing with 3/2016 pap   Flu- is wanting today   Mammogram- last mammogram 10/2018--negative. Has mammogram scheduled for next week.   STDs- declines today     Today's PHQ-2 Score:   PHQ-2 (  [employer]) 10/4/2019   Q1: Little interest or pleasure in doing things 0   Q2: Feeling down, depressed or hopeless 0   PHQ-2 Score 0   Q1: Little interest or pleasure in doing things Not at all   Q2: Feeling down, depressed or hopeless Not at all   PHQ-2 Score 0       Abuse: Current or Past(Physical, Sexual or Emotional)- No  Do you feel safe in your environment? Yes    Social History     Tobacco Use     Smoking status: Never Smoker     Smokeless tobacco: Never Used   Substance Use Topics     Alcohol use: Yes     Alcohol/week: 0.0 standard drinks         Alcohol Use 10/4/2019   Prescreen: >3 drinks/day or >7 drinks/week? No   Prescreen: >3 drinks/day or >7 drinks/week? -       Reviewed orders with patient.  Reviewed health maintenance and updated orders accordingly - Yes  BP Readings from Last 3 Encounters:   10/08/19 104/60   18 118/74   18 128/80    Wt Readings from Last 3 Encounters:   10/08/19 79.8 kg (176 lb)   18 74.4 kg (164 lb)   18 72.7 kg (160 lb 4.8 oz)                  Patient Active Problem List   Diagnosis     Factor V Leiden (H)     GERD (gastroesophageal reflux disease)     Primary female infertility     Anxiety state     Family history of pancreatic cancer     Need for Tdap vaccination     Family history of breast cancer     Supervision of other normal pregnancy, antepartum     Personal history of DVT (deep vein thrombosis)     Pregnancy      (spontaneous vaginal delivery)     Past Surgical History:   Procedure Laterality Date     HC TOOTH EXTRACTION W/FORCEP         Social History     Tobacco Use     Smoking status: Never Smoker     Smokeless tobacco: Never Used   Substance Use Topics     Alcohol use: Yes      Alcohol/week: 0.0 standard drinks     Family History   Problem Relation Age of Onset     Thrombophilia Mother      Breast Cancer Mother      Migraines Mother      Genitourinary Problems Mother      GERD Mother      Cancer Father 63        Pancreatic     Breast Cancer Maternal Grandmother 70     Breast Cancer Paternal Grandmother      Uterine Cancer Paternal Grandmother      Pancreatic Cancer Paternal Grandmother      Chronic Obstructive Pulmonary Disease Paternal Grandfather      Deep Vein Thrombosis Other          Current Outpatient Medications   Medication Sig Dispense Refill     albuterol (PROAIR HFA/PROVENTIL HFA/VENTOLIN HFA) 108 (90 Base) MCG/ACT inhaler Inhale 2 puffs into the lungs every 6 hours as needed for shortness of breath / dyspnea or wheezing 1 Inhaler 0     Omega-3 Fatty Acids (OMEGA-3 FISH OIL PO) Take 1 g by mouth daily Reported on 4/20/2017       order for DME Equipment being ordered: 1 pair jobst stockings 1 Units 0     Prenatal Vit-Fe Fumarate-FA (PRENATAL MULTIVITAMIN W/IRON) 27-0.8 MG tablet Take 1 tablet by mouth daily       Probiotic Product (PROBIOTIC DAILY PO) Reported on 4/20/2017       valACYclovir (VALTREX) 1000 mg tablet Take 2 tablets (2,000 mg) by mouth 2 times daily 4 tablet 3     Allergies   Allergen Reactions     Nkda [No Known Drug Allergies]      Recent Labs   Lab Test 09/14/18  0927 04/20/17  1016 12/30/15  0944  12/17/14  1356 12/12/14  1352 02/22/13  0805   LDL  --   --   --   --   --   --  116   HDL  --   --   --   --   --   --  63   TRIG  --   --   --   --   --   --  83   ALT 15 19  --   --  20  --  31   CR 0.78 0.74 0.82  --   --   --  0.69   GFRESTIMATED 81 87 78  --   --   --  >90   GFRESTBLACK >90 >90   GFR Calc   >90   GFR Calc    --   --   --  >90   POTASSIUM 4.2 4.1 4.1   < >  --   --   --    TSH  --   --  3.27  --   --  1.61  --     < > = values in this interval not displayed.          Pertinent mammograms are reviewed under the  imaging tab.  History of abnormal Pap smear: NO - age 30-65 PAP every 5 years with negative HPV co-testing recommended  PAP / HPV 3/16/2016 2013   PAP NIL NIL     Reviewed and updated as needed this visit by clinical staff  Tobacco  Allergies  Meds  Med Hx  Surg Hx  Fam Hx  Soc Hx        Reviewed and updated as needed this visit by Provider        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 coumadin for a long while     Factor V Leiden (H)     cannot have birth control     GERD (gastroesophageal reflux disease)      Menarche age 12+    cycles q  24-27 x 4-5d       Past Surgical History:   Procedure Laterality Date     HC TOOTH EXTRACTION W/FORCEP       OB History    Para Term  AB Living   3 1 1 0 2 1   SAB TAB Ectopic Multiple Live Births   2 0 0 0 1      # Outcome Date GA Lbr Brian/2nd Weight Sex Delivery Anes PTL Lv   3 Term 10/09/16 40w2d 03:16 :31 3.19 kg (7 lb 0.5 oz) F Vag-Spont EPI N ARIANA      Name: SVETLANA PINA      Apgar1: 7  Apgar5: 8   2 SAB 2015 5w0d          1 2015 6w0d              Review of Systems   Constitutional: Negative for chills and fever.   HENT: Negative for congestion, ear pain, hearing loss and sore throat.    Eyes: Negative for pain and visual disturbance.   Respiratory: Negative for cough and shortness of breath.    Cardiovascular: Negative for chest pain, palpitations and peripheral edema.   Gastrointestinal: Negative for abdominal pain, constipation, diarrhea, heartburn, hematochezia and nausea.   Breasts:  Negative for tenderness, breast mass and discharge.   Genitourinary: Negative for dysuria, frequency, genital sores, hematuria, pelvic pain, urgency, vaginal bleeding and vaginal discharge.   Musculoskeletal: Negative for arthralgias, joint swelling and myalgias.   Skin: Negative for rash.   Neurological: Negative for dizziness, weakness, headaches and paresthesias.  "  Psychiatric/Behavioral: Negative for mood changes. The patient is not nervous/anxious.         OBJECTIVE:   /60 (BP Location: Right arm, Patient Position: Chair, Cuff Size: Adult Regular)   Pulse 59   Temp 98.1  F (36.7  C) (Oral)   Resp 16   Ht 1.689 m (5' 6.5\")   Wt 79.8 kg (176 lb)   LMP 09/29/2019 (Exact Date)   SpO2 100%   BMI 27.98 kg/m    Physical Exam  GENERAL: healthy, alert and no distress  EYES: Eyes grossly normal to inspection, PERRL and conjunctivae and sclerae normal  HENT: ear canals and TM's normal, nose and mouth without ulcers or lesions  NECK: no adenopathy, no asymmetry, masses, or scars and thyroid normal to palpation  RESP: lungs clear to auscultation - no rales, rhonchi or wheezes  BREAST: normal without masses, tenderness or nipple discharge and no palpable axillary masses or adenopathy  CV: regular rate and rhythm, normal S1 S2, no S3 or S4, no murmur, click or rub, no peripheral edema and peripheral pulses strong  ABDOMEN: soft, nontender, no hepatosplenomegaly, no masses and bowel sounds normal   (female): normal female external genitalia, normal urethral meatus, vaginal mucosa pink, moist, well rugated, and normal cervix/adnexa/uterus without masses or discharge  MS: no gross musculoskeletal defects noted, no edema  SKIN: no suspicious lesions or rashes  NEURO: Normal strength and tone, mentation intact and speech normal  PSYCH: mentation appears normal, affect normal/bright    Diagnostic Test Results:  No results found for this or any previous visit (from the past 24 hour(s)).    ASSESSMENT/PLAN:   (Z00.00) Routine general medical examination at a health care facility  (primary encounter diagnosis)  Comment: no abnormal findings   Plan: f/u with routine annual preventive visit     (O99.519,  J45.909) Asthma during pregnancy  Comment: asthma seems to be pregnancy-induced. Possibly triggered with URI   Plan: continue to monitor. Albuterol inhaler on-hand if needed. "     (B00.1) Cold sore  Comment: no current cold sores. Does have larger and multiple vesicles during a breakout.   Plan: valACYclovir (VALTREX) 1000 mg tablet        Refilled valtrex to have on-hand.     (Z86.718) Personal history of DVT (deep vein thrombosis)/ (D68.51) Factor V Leiden mutation (H)  Comment: Per heme, literature does not support use of aspirin for prophylaxis. Last DVT was 2005 and happened with the use of estrogen.   Plan: order for DME        Prescription written for Jobst stockings. Instructed pt to wear at minimal when traveling. Advised frequent ankle pumps and movement to help prevent clots.     (F41.1) WILIAN (generalized anxiety disorder)  Comment: Seems to be situationally worsened d/t new job. No history of medication use.   Plan: Elected to hold off on medication at this time. Encouraged pt to seek care if symptoms worsen. Would think about starting on serotonin specific reuptake inhibitor.     (Z01.419) Encounter for gynecological examination without abnormal finding  Comment: no history of abnormal paps  Plan: Pap imaged thin layer screen with HPV -         recommended age 30 - 65 years (select HPV order        below), HPV High Risk Types DNA Cervical        Pap performed today with co-testing for high risk HPV types. If negative, can resume every 5 year paps with HPV cotesting.     (Z13.6) CARDIOVASCULAR SCREENING; LDL GOAL LESS THAN 130  Comment: no lipid panel on file   Plan: Lipid panel reflex to direct LDL Fasting        Will obtain fasting lipid panel today.     (Z13.1) Screening for diabetes mellitus  Comment: no personal or familial history of DM   Plan: Glucose        Will obtain fasting glucose today.     Follow-up: Annually for routine preventative care     Counseling Resources:  ATP IV Guidelines  Pooled Cohorts Equation Calculator  Breast Cancer Risk Calculator  FRAX Risk Assessment  ICSI Preventive Guidelines  Dietary Guidelines for Americans, 2010  USDA's MyPlate  ASA  Prophylaxis  Lung CA Screening    [facility]AGO [facility]

## 2019-10-08 ENCOUNTER — OFFICE VISIT (OUTPATIENT)
Dept: FAMILY MEDICINE | Facility: CLINIC | Age: 44
End: 2019-10-08
Payer: COMMERCIAL

## 2019-10-08 VITALS
RESPIRATION RATE: 16 BRPM | HEIGHT: 67 IN | BODY MASS INDEX: 27.62 KG/M2 | SYSTOLIC BLOOD PRESSURE: 104 MMHG | WEIGHT: 176 LBS | HEART RATE: 59 BPM | OXYGEN SATURATION: 100 % | TEMPERATURE: 98.1 F | DIASTOLIC BLOOD PRESSURE: 60 MMHG

## 2019-10-08 DIAGNOSIS — Z13.1 SCREENING FOR DIABETES MELLITUS: ICD-10-CM

## 2019-10-08 DIAGNOSIS — F41.1 GAD (GENERALIZED ANXIETY DISORDER): ICD-10-CM

## 2019-10-08 DIAGNOSIS — B00.1 COLD SORE: ICD-10-CM

## 2019-10-08 DIAGNOSIS — Z00.00 ROUTINE GENERAL MEDICAL EXAMINATION AT A HEALTH CARE FACILITY: Primary | ICD-10-CM

## 2019-10-08 DIAGNOSIS — Z13.6 CARDIOVASCULAR SCREENING; LDL GOAL LESS THAN 130: ICD-10-CM

## 2019-10-08 DIAGNOSIS — D68.51 FACTOR V LEIDEN MUTATION (H): ICD-10-CM

## 2019-10-08 DIAGNOSIS — J45.909 ASTHMA DURING PREGNANCY: ICD-10-CM

## 2019-10-08 DIAGNOSIS — O99.519 ASTHMA DURING PREGNANCY: ICD-10-CM

## 2019-10-08 DIAGNOSIS — Z01.419 ENCOUNTER FOR GYNECOLOGICAL EXAMINATION WITHOUT ABNORMAL FINDING: ICD-10-CM

## 2019-10-08 DIAGNOSIS — Z86.718 PERSONAL HISTORY OF DVT (DEEP VEIN THROMBOSIS): ICD-10-CM

## 2019-10-08 LAB
CHOLEST SERPL-MCNC: 229 MG/DL
GLUCOSE SERPL-MCNC: 90 MG/DL (ref 70–99)
HDLC SERPL-MCNC: 71 MG/DL
LDLC SERPL CALC-MCNC: 142 MG/DL
NONHDLC SERPL-MCNC: 158 MG/DL
TRIGL SERPL-MCNC: 81 MG/DL

## 2019-10-08 PROCEDURE — 80061 LIPID PANEL: CPT | Performed by: NURSE PRACTITIONER

## 2019-10-08 PROCEDURE — G0145 SCR C/V CYTO,THINLAYER,RESCR: HCPCS | Performed by: NURSE PRACTITIONER

## 2019-10-08 PROCEDURE — 90686 IIV4 VACC NO PRSV 0.5 ML IM: CPT | Performed by: NURSE PRACTITIONER

## 2019-10-08 PROCEDURE — 82947 ASSAY GLUCOSE BLOOD QUANT: CPT | Performed by: NURSE PRACTITIONER

## 2019-10-08 PROCEDURE — 99396 PREV VISIT EST AGE 40-64: CPT | Mod: 25 | Performed by: NURSE PRACTITIONER

## 2019-10-08 PROCEDURE — 87624 HPV HI-RISK TYP POOLED RSLT: CPT | Performed by: NURSE PRACTITIONER

## 2019-10-08 PROCEDURE — 36415 COLL VENOUS BLD VENIPUNCTURE: CPT | Performed by: NURSE PRACTITIONER

## 2019-10-08 PROCEDURE — 90471 IMMUNIZATION ADMIN: CPT | Performed by: NURSE PRACTITIONER

## 2019-10-08 RX ORDER — ALBUTEROL SULFATE 90 UG/1
2 AEROSOL, METERED RESPIRATORY (INHALATION) EVERY 6 HOURS PRN
Qty: 1 INHALER | Refills: 0 | Status: SHIPPED | OUTPATIENT
Start: 2019-10-08 | End: 2019-10-27

## 2019-10-08 RX ORDER — VALACYCLOVIR HYDROCHLORIDE 1 G/1
2000 TABLET, FILM COATED ORAL 2 TIMES DAILY
Qty: 4 TABLET | Refills: 3 | Status: SHIPPED | OUTPATIENT
Start: 2019-10-08 | End: 2021-04-14

## 2019-10-08 ASSESSMENT — ENCOUNTER SYMPTOMS
HEARTBURN: 0
DIZZINESS: 0
JOINT SWELLING: 0
ARTHRALGIAS: 0
NAUSEA: 0
FEVER: 0
HEMATOCHEZIA: 0
HEADACHES: 0
SORE THROAT: 0
DYSURIA: 0
WEAKNESS: 0
PALPITATIONS: 0
COUGH: 0
CONSTIPATION: 0
EYE PAIN: 0
BREAST MASS: 0
SHORTNESS OF BREATH: 0
HEMATURIA: 0
NERVOUS/ANXIOUS: 0
FREQUENCY: 0
PARESTHESIAS: 0
MYALGIAS: 0
DIARRHEA: 0
CHILLS: 0
ABDOMINAL PAIN: 0

## 2019-10-08 ASSESSMENT — MIFFLIN-ST. JEOR: SCORE: 1483.02

## 2019-10-08 NOTE — PATIENT INSTRUCTIONS
1.  Update labs and flu shot today  2.  Pap today  3.  Refilled inhaler and valtrex  4.  Follow up if worsening or ongoing anxiety   5.  Prescription for jobst stockings    Preventive Health Recommendations  Female Ages 40 to 49    Yearly exam:     See your health care provider every year in order to  1. Review health changes.   2. Discuss preventive care.    3. Review your medicines if your doctor prescribed any.      Get a Pap test every three years (unless you have an abnormal result and your provider advises testing more often).      If you get Pap tests with HPV test, you only need to test every 5 years, unless you have an abnormal result. You do not need a Pap test if your uterus was removed (hysterectomy) and you have not had cancer.      You should be tested each year for STDs (sexually transmitted diseases), if you're at risk.     Ask your doctor if you should have a mammogram.      Have a colonoscopy (test for colon cancer) if someone in your family has had colon cancer or polyps before age 50.       Have a cholesterol test every 5 years.       Have a diabetes test (fasting glucose) after age 45. If you are at risk for diabetes, you should have this test every 3 years.    Shots: Get a flu shot each year. Get a tetanus shot every 10 years.     Nutrition:     Eat at least 5 servings of fruits and vegetables each day.    Eat whole-grain bread, whole-wheat pasta and brown rice instead of white grains and rice.    Get adequate Calcium and Vitamin D.      Lifestyle    Exercise at least 150 minutes a week (an average of 30 minutes a day, 5 days a week). This will help you control your weight and prevent disease.    Limit alcohol to one drink per day.    No smoking.     Wear sunscreen to prevent skin cancer.    See your dentist every six months for an exam and cleaning.

## 2019-10-11 LAB
COPATH REPORT: NORMAL
PAP: NORMAL

## 2019-10-15 LAB
FINAL DIAGNOSIS: NORMAL
HPV HR 12 DNA CVX QL NAA+PROBE: NEGATIVE
HPV16 DNA SPEC QL NAA+PROBE: NEGATIVE
HPV18 DNA SPEC QL NAA+PROBE: NEGATIVE
SPECIMEN DESCRIPTION: NORMAL
SPECIMEN SOURCE CVX/VAG CYTO: NORMAL

## 2019-10-17 DIAGNOSIS — Z12.31 VISIT FOR SCREENING MAMMOGRAM: ICD-10-CM

## 2019-10-27 DIAGNOSIS — R05.9 COUGH: Primary | ICD-10-CM

## 2019-10-28 RX ORDER — ALBUTEROL SULFATE 90 UG/1
AEROSOL, METERED RESPIRATORY (INHALATION)
Qty: 8.5 INHALER | Refills: 0 | Status: SHIPPED | OUTPATIENT
Start: 2019-10-28 | End: 2021-04-14

## 2019-10-28 NOTE — TELEPHONE ENCOUNTER
"Requested Prescriptions   Pending Prescriptions Disp Refills     albuterol (PROAIR HFA/PROVENTIL HFA/VENTOLIN HFA) 108 (90 Base) MCG/ACT inhaler [Pharmacy Med Name: ALBUTEROL HFA (PROAIR) INHALER] 8.5 Inhaler 0     Sig: INHALE 2 PUFFS BY MOUTH EVERY 6 HOURS AS NEEDED FOR WHEEZE OR FOR SHORTNESS OF BREATH         Last Written Prescription Date:  10/8/19  Last Fill Quantity: 1 inhaler,   # refills: 0  Last Office Visit: 10/8/19  Future Office visit:       Routing refill request to provider for review/approval because:  No dx      Asthma Maintenance Inhalers - Anticholinergics Passed - 10/27/2019 12:35 AM        Passed - Patient is age 12 years or older        Passed - Recent (12 mo) or future (30 days) visit within the authorizing provider's specialty     Patient has had an office visit with the authorizing provider or a provider within the authorizing providers department within the previous 12 mos or has a future within next 30 days. See \"Patient Info\" tab in inbasket, or \"Choose Columns\" in Meds & Orders section of the refill encounter.              Passed - Medication is active on med list          "

## 2019-11-04 ENCOUNTER — MYC MEDICAL ADVICE (OUTPATIENT)
Dept: FAMILY MEDICINE | Facility: CLINIC | Age: 44
End: 2019-11-04

## 2019-11-04 DIAGNOSIS — B00.9 HSV (HERPES SIMPLEX VIRUS) INFECTION: Primary | ICD-10-CM

## 2019-11-04 NOTE — TELEPHONE ENCOUNTER
Routing to provider Evan Tellez - please review and advise as appropriate    Stating frequent cold sore outbreaks and requesting daily maintenance medication - do you want e-visit or office visit to discuss?    Patient says she may have another outbreak coming on do you want to assess?

## 2019-11-05 RX ORDER — VALACYCLOVIR HYDROCHLORIDE 500 MG/1
500 TABLET, FILM COATED ORAL DAILY
Qty: 90 TABLET | Refills: 3 | Status: SHIPPED | OUTPATIENT
Start: 2019-11-05 | End: 2020-04-15

## 2019-11-06 ENCOUNTER — HEALTH MAINTENANCE LETTER (OUTPATIENT)
Age: 44
End: 2019-11-06

## 2019-12-05 ENCOUNTER — TELEPHONE (OUTPATIENT)
Dept: FAMILY MEDICINE | Facility: CLINIC | Age: 44
End: 2019-12-05

## 2019-12-05 NOTE — TELEPHONE ENCOUNTER
Panel Management Review      Patient has the following on her problem list:   Asthma review   No flowsheet data found.   1. Is Asthma diagnosis on the Problem List? Yes    2. Is Asthma listed on Health Maintenance? Yes    3. Patient is due for:  ACT and AAP      Composite cancer screening  Chart review shows that this patient is due/due soon for the following None  Summary:    Patient is due/failing the following:   AAP and ACT    Action needed:   Patient needs to do ACT.    Type of outreach:    Sent [employer] message.    Questions for provider review:    None                                                                                                                                    Mery Amos [facility]       Chart routed to Care Team .

## 2019-12-07 ASSESSMENT — ASTHMA QUESTIONNAIRES: ACT_TOTALSCORE: 25

## 2020-01-09 ENCOUNTER — TELEPHONE (OUTPATIENT)
Dept: FAMILY MEDICINE | Facility: CLINIC | Age: 45
End: 2020-01-09

## 2020-01-09 NOTE — TELEPHONE ENCOUNTER
Panel Management Review      Patient has the following on her problem list:   Asthma review   No flowsheet data found.   1. Is Asthma diagnosis on the Problem List? Yes    2. Is Asthma listed on Health Maintenance? Yes    3. Patient is due for:  ACT and AAP      Composite cancer screening  Chart review shows that this patient is due/due soon for the following None  Summary:    Patient is due/failing the following:   AAP and ACT    Action needed:   Patient needs to do ACT.    Type of outreach:    Called patient, LVM    Questions for provider review:    None                                                                                                                                    Mrey Amos CMA       Chart routed to Care Team .

## 2020-02-08 ASSESSMENT — ASTHMA QUESTIONNAIRES: ACT_TOTALSCORE: 25

## 2020-02-18 NOTE — PROGRESS NOTES
Subjective     Shauna Becerra is a 45 year old female who presents to clinic today for the following health issues:    HPI     Shortness of Breath      Duration: early February    Description (location/character/radiation): more winded, feeling heart rate or breathing is faster than normal, one instance of being short of breath while sitting and talking    Intensity:  moderate    Accompanying signs and symptoms: has had some cough since URI few weeks ago    History (similar episodes/previous evaluation): DVT and PE 2005    Precipitating or alleviating factors: None    Therapies tried and outcome: albuterol inhaler- doesn't feel like it's helping       Shortness of breath started early February.  More out of breath than she would expect with exertion.  Feels HR is elevated.  Monday was having coffee with someone, was talking a lot and this made her feel out of breath.  Noticing episodes here and there.  Using albuterol inhaler, not helping.  Shortness of breath seems to happen in specific episodes and with exertion.  Out of breath when she goes up basement stairs.  No palpitations or racing heart, feels heart is working harder.  No chest pain.  Worked out at the gym Monday night without symptoms impacting workout.  Since november doing lifetime class strength and cardio 3 days a week.    Has had some lingering URIs, no cough, no wheezing. Mild PND, no real other symptoms.  No fever.  No syncope or presyncope.      Traveled to [place] 1/27, day trip.  No pain, swelling, redness in legs.  Had calf pain with prior DVT 2005.    Traveling to [place] this month for vacation with her mom, going without daughter.    Relevant medical history:  Factor v with hx DVT and PE (2005)- VTE triggered by estrogen use (oral contraception).   Saw heme 2017, recommended avoid estrogen for contraception, should be anticoagulated for surgery, immobility, pregnancy.  Asked hematology about aspirin for travel, recommended against due to  GERD and lack of evidence. No new DVTs since .    Asthma during pregnancy: was diagnosed with mild asthma during pregnancy 2016 via spirometry testing. Symptoms of shortness of breath and wheezing subsided after delivery. Did use albuterol inhaler a couple of times last year when she had a viral URI and some SOB with that        Patient Active Problem List   Diagnosis     Factor V Leiden (H)     GERD (gastroesophageal reflux disease)     Primary female infertility     Anxiety state     Family history of pancreatic cancer     Need for Tdap vaccination     Family history of breast cancer     Supervision of other normal pregnancy, antepartum     Personal history of DVT (deep vein thrombosis)     Pregnancy      (spontaneous vaginal delivery)     Past Surgical History:   Procedure Laterality Date     HC TOOTH EXTRACTION W/FORCEP         Social History     Tobacco Use     Smoking status: Never Smoker     Smokeless tobacco: Never Used   Substance Use Topics     Alcohol use: Yes     Alcohol/week: 0.0 standard drinks     Family History   Problem Relation Age of Onset     Thrombophilia Mother      Breast Cancer Mother      Migraines Mother      Genitourinary Problems Mother      GERD Mother      Cancer Father 63        Pancreatic     Breast Cancer Maternal Grandmother 70     Breast Cancer Paternal Grandmother      Uterine Cancer Paternal Grandmother      Pancreatic Cancer Paternal Grandmother      Chronic Obstructive Pulmonary Disease Paternal Grandfather      Deep Vein Thrombosis Other          Current Outpatient Medications   Medication Sig Dispense Refill     albuterol (PROAIR HFA/PROVENTIL HFA/VENTOLIN HFA) 108 (90 Base) MCG/ACT inhaler INHALE 2 PUFFS BY MOUTH EVERY 6 HOURS AS NEEDED FOR WHEEZE OR FOR SHORTNESS OF BREATH 8.5 Inhaler 0     Omega-3 Fatty Acids (OMEGA-3 FISH OIL PO) Take 1 g by mouth daily Reported on 2017       order for DME Equipment being ordered: 1 pair jobst stockings 1 Units 0     Prenatal  Vit-Fe Fumarate-FA (PRENATAL MULTIVITAMIN W/IRON) 27-0.8 MG tablet Take 1 tablet by mouth daily       Probiotic Product (PROBIOTIC DAILY PO) Reported on 4/20/2017       valACYclovir (VALTREX) 1000 mg tablet Take 2 tablets (2,000 mg) by mouth 2 times daily 4 tablet 3     valACYclovir (VALTREX) 500 MG tablet Take 1 tablet (500 mg) by mouth daily 90 tablet 3         Reviewed and updated as needed this visit by Provider         Review of Systems   ROS COMP: Constitutional, HEENT, cardiovascular, pulmonary, gi and gu systems are negative, except as otherwise noted.      Objective    /72 (BP Location: Right arm, Patient Position: Chair, Cuff Size: Adult Regular)   Pulse 82   Temp 98  F (36.7  C) (Oral)   Resp 16   Wt 81.2 kg (179 lb)   SpO2 100%   BMI 28.46 kg/m    Body mass index is 28.46 kg/m .  Physical Exam   GENERAL APPEARANCE: healthy, alert and no distress  EYES: Eyes grossly normal to inspection and conjunctivae and sclerae normal  HENT: ear canals and TM's normal and nose and mouth without ulcers or lesions  NECK: no adenopathy, no asymmetry, masses, or scars and thyroid normal to palpation  RESP: lungs clear to auscultation - no rales, rhonchi or wheezes  CV: regular rates and rhythm, normal S1 S2, no S3 or S4 and no murmur, click or rub  PSYCH: mentation appears normal and affect normal/bright       Diagnostic Test Results:  Chest xray negative for infiltrate on my interpretation, radiology read pending.    EKG NSR.        Assessment & Plan     (R06.02) Shortness of breath  (primary encounter diagnosis)  Comment: CXR neg for pneumonia or lung mass.  EKG neg for ischemia or arrhythmia.  Consider PE (history of factor v with prior PE), asthma (history of asthma during pregnancy), anemia, thyroid disorder, or other etiology  Plan: EKG 12-lead complete w/read - Clinics, TSH with        free T4 reflex, Basic metabolic panel, XR Chest        2 Views, D dimer, quantitative, CANCELED: D         dimer,  quantitative        Labs today to r/o anemia and thyroid disorder.  WELLS criteria with low probability of FL, ddimer is appropriate for evaluation.  If negative this is reassuring and makes PE very unlikely.  In that case we discussed trial of ICS with follow up in clinic in 1 mo for recheck.  If ongoing symptoms would recommend PFTs and pulm eval.  If positive ddimer will get CT scan today.  I will call her with positive results and mychart with negative results.  We reviewed ICS technique, MOA, delayed efficacy, and oral rinse.    (Z86.116) History of pulmonary embolism  Comment:   Plan: as above        See Patient Instructions    No follow-ups on file.    THIAGO [facility]    \

## 2020-02-19 ENCOUNTER — ANCILLARY PROCEDURE (OUTPATIENT)
Dept: GENERAL RADIOLOGY | Facility: CLINIC | Age: 45
End: 2020-02-19
Attending: NURSE PRACTITIONER
Payer: COMMERCIAL

## 2020-02-19 ENCOUNTER — OFFICE VISIT (OUTPATIENT)
Dept: FAMILY MEDICINE | Facility: CLINIC | Age: 45
End: 2020-02-19
Payer: COMMERCIAL

## 2020-02-19 VITALS
BODY MASS INDEX: 28.46 KG/M2 | RESPIRATION RATE: 16 BRPM | SYSTOLIC BLOOD PRESSURE: 116 MMHG | TEMPERATURE: 98 F | HEART RATE: 82 BPM | OXYGEN SATURATION: 100 % | WEIGHT: 179 LBS | DIASTOLIC BLOOD PRESSURE: 72 MMHG

## 2020-02-19 DIAGNOSIS — Z86.711 HISTORY OF PULMONARY EMBOLISM: ICD-10-CM

## 2020-02-19 DIAGNOSIS — R06.02 SHORTNESS OF BREATH: Primary | ICD-10-CM

## 2020-02-19 DIAGNOSIS — R06.02 SHORTNESS OF BREATH: ICD-10-CM

## 2020-02-19 LAB — D DIMER PPP FEU-MCNC: <0.3 UG/ML FEU (ref 0–0.5)

## 2020-02-19 PROCEDURE — 93000 ELECTROCARDIOGRAM COMPLETE: CPT | Performed by: NURSE PRACTITIONER

## 2020-02-19 PROCEDURE — 85379 FIBRIN DEGRADATION QUANT: CPT | Performed by: NURSE PRACTITIONER

## 2020-02-19 PROCEDURE — 80048 BASIC METABOLIC PNL TOTAL CA: CPT | Performed by: NURSE PRACTITIONER

## 2020-02-19 PROCEDURE — 99214 OFFICE O/P EST MOD 30 MIN: CPT | Performed by: NURSE PRACTITIONER

## 2020-02-19 PROCEDURE — 71046 X-RAY EXAM CHEST 2 VIEWS: CPT

## 2020-02-19 PROCEDURE — 36415 COLL VENOUS BLD VENIPUNCTURE: CPT | Performed by: NURSE PRACTITIONER

## 2020-02-19 PROCEDURE — 84443 ASSAY THYROID STIM HORMONE: CPT | Performed by: NURSE PRACTITIONER

## 2020-02-19 RX ORDER — FLUTICASONE PROPIONATE 110 UG/1
1 AEROSOL, METERED RESPIRATORY (INHALATION) 2 TIMES DAILY
Qty: 1 INHALER | Refills: 0 | Status: SHIPPED | OUTPATIENT
Start: 2020-02-19 | End: 2020-04-21

## 2020-02-19 NOTE — PATIENT INSTRUCTIONS
1.  Normal EKG rules out afib.  Normal chest xray rules out pneumonia or lung mass.  Labs today for thyroid, hemoglobin, and ddimer to eval for PE.  If positive ddimer will need CT scan today.    2.  If ddimer is normal lets try an inhaled steroid for possible asthma, you would take this twice a day every day, rinse your mouth out after.  Lets follow up in 1 month for recheck.  Will take 1-2 weeks to see improvement with the inhaler.  Consistent use is important.

## 2020-02-19 NOTE — RESULT ENCOUNTER NOTE
Shauna,    Great news, ddimer was normal.  This makes PE unlikely, I dont think we need the CT scan.    I will send in a prescription for the flovent inhaler.  These inhaled steroids can be expensive, d sometimes theres a specific brand your insurance covers.  If it is very expensive it may be worth calling your insurance to see if there is a brand with better coverage.  Let me know and I will send that brand in.    Use the inhaler twice a day every day, rinse your mouth out after.  It may take 1-2 weeks to see improvement in the shortness of breath.  Lets follow up in 1 month to see how you are doing, sooner if symptoms are worsening.    Still waiting on some other blood work, I'll send it through when I get it back tomorrow.  Let me know if any questions or concerns.    Evelin Tellez, CNP

## 2020-02-20 ENCOUNTER — DOCUMENTATION ONLY (OUTPATIENT)
Dept: FAMILY MEDICINE | Facility: CLINIC | Age: 45
End: 2020-02-20

## 2020-02-20 LAB
ANION GAP SERPL CALCULATED.3IONS-SCNC: 6 MMOL/L (ref 3–14)
BUN SERPL-MCNC: 15 MG/DL (ref 7–30)
CALCIUM SERPL-MCNC: 8.6 MG/DL (ref 8.5–10.1)
CHLORIDE SERPL-SCNC: 104 MMOL/L (ref 94–109)
CO2 SERPL-SCNC: 27 MMOL/L (ref 20–32)
CREAT SERPL-MCNC: 0.7 MG/DL (ref 0.52–1.04)
GFR SERPL CREATININE-BSD FRML MDRD: >90 ML/MIN/{1.73_M2}
GLUCOSE SERPL-MCNC: 108 MG/DL (ref 70–99)
POTASSIUM SERPL-SCNC: 4.1 MMOL/L (ref 3.4–5.3)
SODIUM SERPL-SCNC: 137 MMOL/L (ref 133–144)
TSH SERPL DL<=0.005 MIU/L-ACNC: 1.84 MU/L (ref 0.4–4)

## 2020-02-20 NOTE — PROGRESS NOTES
CBC add-on for this patient cannot be done. No purple tube drawn. Order has been futured.    Thanks,  lab

## 2020-02-21 NOTE — RESULT ENCOUNTER NOTE
Miguel A Villatoro,    Normal thyroid, kidney function, and electrolytes.  Unfortunately I forgot to order a CBC to check your hemoglobin and the lab is unable to add it on to the blood that has been drawn.  Could you schedule a lab only visit at your convenience to check this?  I am sorry about the inconvenience.  You do not need to fast for this test.    Evelin Tellez, CNP

## 2020-02-23 ENCOUNTER — MYC MEDICAL ADVICE (OUTPATIENT)
Dept: FAMILY MEDICINE | Facility: CLINIC | Age: 45
End: 2020-02-23

## 2020-04-07 ENCOUNTER — MYC MEDICAL ADVICE (OUTPATIENT)
Dept: FAMILY MEDICINE | Facility: CLINIC | Age: 45
End: 2020-04-07

## 2020-04-07 DIAGNOSIS — N63.10 LUMP OF RIGHT BREAST: Primary | ICD-10-CM

## 2020-04-07 NOTE — TELEPHONE ENCOUNTER
HW Provider-Please review and advise/sign if agree.    Diagnostic mammogram and US orders pended.    Thank you!  HETAL SyN, RN

## 2020-04-07 NOTE — TELEPHONE ENCOUNTER
Signed, can you let patient know she can schedule?  Thanks,  Dr. Amy Cordova MD / [facility]

## 2020-04-15 ENCOUNTER — TELEPHONE (OUTPATIENT)
Dept: FAMILY MEDICINE | Facility: CLINIC | Age: 45
End: 2020-04-15

## 2020-04-15 DIAGNOSIS — B00.9 HSV (HERPES SIMPLEX VIRUS) INFECTION: ICD-10-CM

## 2020-04-15 RX ORDER — VALACYCLOVIR HYDROCHLORIDE 500 MG/1
500 TABLET, FILM COATED ORAL DAILY
Qty: 90 TABLET | Refills: 2 | Status: SHIPPED | OUTPATIENT
Start: 2020-04-15 | End: 2021-04-14

## 2020-04-15 NOTE — TELEPHONE ENCOUNTER
My chart msg sent to pt to clarify dosage of valtrex requested- as she has two on file  One for hsv 1 and one for hsv2  Also has refills at [facility]  So not sure if [facility] is [facility] new [facility]??  Alyx Valentine RN

## 2020-04-17 ENCOUNTER — ANCILLARY PROCEDURE (OUTPATIENT)
Dept: MAMMOGRAPHY | Facility: CLINIC | Age: 45
End: 2020-04-17
Attending: FAMILY MEDICINE
Payer: COMMERCIAL

## 2020-04-17 DIAGNOSIS — N63.0 BREAST LUMP: ICD-10-CM

## 2020-04-17 NOTE — RESULT ENCOUNTER NOTE
Kelvin Villatoro:  Good news!  Your mammogram looks normal and reassuring.  The breast center will send along a more detailed report.  Let us know if you have any questions about this.  Best,  Dr. Amy Cordova MD  [facility]  [phone]

## 2020-04-18 DIAGNOSIS — R06.02 SHORTNESS OF BREATH: ICD-10-CM

## 2020-04-19 NOTE — TELEPHONE ENCOUNTER
"Requested Prescriptions   Pending Prescriptions Disp Refills     FLOVENT  MCG/ACT inhaler [Pharmacy Med Name: FLOVENT  MCG INHALER] 12 Inhaler 0     Sig: TAKE 1 PUFF BY MOUTH TWICE A DAY      Last Written Prescription Date:  2/19/2020  Last Fill Quantity: 1 inhaler   ,  # refills: 0   Last Office Visit: 2/19/2020   Future Office Visit:            Inhaled Steroids Protocol Passed - 4/18/2020 11:49 AM        Passed - Patient is age 12 or older        Passed - Recent (12 mo) or future (30 days) visit within the authorizing provider's specialty     Patient has had an office visit with the authorizing provider or a provider within the authorizing providers department within the previous 12 mos or has a future within next 30 days. See \"Patient Info\" tab in inbasket, or \"Choose Columns\" in Meds & Orders section of the refill encounter.          Passed - Medication is active on med list           ACT Total Scores 12/6/2019 2/7/2020   ACT TOTAL SCORE (Goal Greater than or Equal to 20) 25 25   In the past 12 months, how many times did you visit the emergency room for your asthma without being admitted to the hospital? 0 0   In the past 12 months, how many times were you hospitalized overnight because of your asthma? 0 0       "

## 2020-04-21 RX ORDER — DEXAMETHASONE 4 MG/1
TABLET ORAL
Qty: 3 INHALER | Refills: 0 | Status: SHIPPED | OUTPATIENT
Start: 2020-04-21 | End: 2021-04-14

## 2020-06-01 NOTE — PROGRESS NOTES
SUBJECTIVE:                                                   Shauna Becerra is a 45 year old female who presents to clinic today for the following health issue(s):  Patient presents with:  Consult: here to discuss changing cycles during her last 4 cycles.      HPI: The patient is a 45-year-old  3 para 1-0-2-1 who is seen at this time for dysfunctional cycles.  She attained a pregnancy at age 41 without stimulation and had a normal vaginal delivery.  She had had 2 miscarriages prior to that.  She has factor V Leiden heterozygous and has a history of DVT and PE on birth control pills.  She had had fairly regular cycles at 25 to 28 days but for the last 3 months has had shorter cycles.  She also relates a new form of headache in the last 6 months.  She is on no anticoagulant at this time.    Patient's last menstrual period was 2020..     Patient is sexually active, .  Using none for contraception.    reports that she has never smoked. She has never used smokeless tobacco.    STD testing offered?  Declined    Health maintenance updated:  yes    Today's PHQ-2 Score:   PHQ-2 (  [employer]) 2020   Q1: Little interest or pleasure in doing things 0   Q2: Feeling down, depressed or hopeless 0   PHQ-2 Score 0   Q1: Little interest or pleasure in doing things -   Q2: Feeling down, depressed or hopeless -   PHQ-2 Score -     Today's PHQ-9 Score:   PHQ-9 SCORE 2016   PHQ-9 Total Score 1     Today's WILIAN-7 Score: No flowsheet data found.    Problem list and histories reviewed & adjusted, as indicated.  Additional history: as documented.    Patient Active Problem List   Diagnosis     Factor V Leiden (H)     GERD (gastroesophageal reflux disease)     Primary female infertility     Anxiety state     Family history of pancreatic cancer     Need for Tdap vaccination     Family history of breast cancer     Supervision of other normal pregnancy, antepartum     Personal history of DVT (deep vein  "thrombosis)     Pregnancy      (spontaneous vaginal delivery)     Past Surgical History:   Procedure Laterality Date     HC TOOTH EXTRACTION W/FORCEP        Social History     Tobacco Use     Smoking status: Never Smoker     Smokeless tobacco: Never Used   Substance Use Topics     Alcohol use: Yes     Alcohol/week: 0.0 standard drinks      Problem (# of Occurrences) Relation (Name,Age of Onset)    Breast Cancer (3) Mother, Maternal Grandmother (70), Paternal Grandmother    Cancer (1) Father (63): Pancreatic    Chronic Obstructive Pulmonary Disease (1) Paternal Grandfather    Deep Vein Thrombosis (1) Other    GERD (1) Mother    Genitourinary Problems (1) Mother    Migraines (1) Mother    Pancreatic Cancer (1) Paternal Grandmother    Thrombophilia (1) Mother    Uterine Cancer (1) Paternal Grandmother            Current Outpatient Medications   Medication Sig     albuterol (PROAIR HFA/PROVENTIL HFA/VENTOLIN HFA) 108 (90 Base) MCG/ACT inhaler INHALE 2 PUFFS BY MOUTH EVERY 6 HOURS AS NEEDED FOR WHEEZE OR FOR SHORTNESS OF BREATH     FLOVENT  MCG/ACT inhaler TAKE 1 PUFF BY MOUTH TWICE A DAY     MULTIPLE VITAMINS PO      Omega-3 Fatty Acids (OMEGA-3 FISH OIL PO) Take 1 g by mouth daily Reported on 2017     order for DME Equipment being ordered: 1 pair jobst stockings     Probiotic Product (PROBIOTIC DAILY PO) Reported on 2017     valACYclovir (VALTREX) 1000 mg tablet Take 2 tablets (2,000 mg) by mouth 2 times daily     valACYclovir (VALTREX) 500 MG tablet Take 1 tablet (500 mg) by mouth daily     No current facility-administered medications for this visit.      Allergies   Allergen Reactions     Nkda [No Known Drug Allergies]        ROS:  12 point review of systems negative other than symptoms noted below or in the HPI.  Genitourinary: Irregular Menses  Neurologic: Headaches  No urinary frequency or dysuria, bladder or kidney problems      OBJECTIVE:     /68   Pulse 72   Ht 1.702 m (5' 7\")   Wt " 79.4 kg (175 lb)   LMP 05/27/2020   BMI 27.41 kg/m    Body mass index is 27.41 kg/m .    Exam:  Constitutional:  Appearance: Well nourished, well developed alert, in no acute distress  Gastrointestinal:  Abdominal Examination:  Abdomen nontender to palpation, tone normal without rigidity or guarding, no masses present, umbilicus without lesions; Liver/Spleen:  No hepatomegaly present, liver nontender to palpation; Hernias:  No hernias present  Lymphatic: Lymph Nodes:  No other lymphadenopathy present  Skin: General Inspection:  No rashes present, no lesions present, no areas of discoloration.  Neurologic:  Mental Status:  Oriented X3.  Normal strength and tone, sensory exam grossly normal, mentation intact and speech normal.    Psychiatric:  Mentation appears normal and affect normal/bright.  Pelvic Exam:  External Genitalia:     Normal appearance for age, no discharge present, no tenderness present, no inflammatory lesions present, color normal  Vagina:     Normal vaginal vault without central or paravaginal defects, no discharge present, no inflammatory lesions present, no masses present  Bladder:     Nontender to palpation  Urethra:   Urethral Body:  Urethra palpation normal, urethra structural support normal   Urethral Meatus:  No erythema or lesions present  Cervix:     Appearance healthy, no lesions present, nontender to palpation, no bleeding present  Uterus:     Uterus: firm, normal sized and nontender, midplane in position.   Adnexa:     No adnexal tenderness present, no adnexal masses present  Perineum:     Perineum within normal limits, no evidence of trauma, no rashes or skin lesions present  Anus:     Anus within normal limits, no hemorrhoids present  Inguinal Lymph Nodes:     No lymphadenopathy present  Pubic Hair:     Normal pubic hair distribution for age  Genitalia and Groin:     No rashes present, no lesions present, no areas of discoloration, no masses present       In-Clinic Test  Results:    ASSESSMENT/PLAN:                                                        45-year-old with perimenopausal dysfunctional uterine bleeding.  She has a normal pelvic exam at this time.  We have asked her to track her cycles and her headaches.  She will return in September if things have continued to be erratic.  If she gets back to normal cycles we will see her at her yearly exam.          Tone Enamorado MD  [facility]

## 2020-06-02 ENCOUNTER — OFFICE VISIT (OUTPATIENT)
Dept: OBGYN | Facility: CLINIC | Age: 45
End: 2020-06-02
Payer: COMMERCIAL

## 2020-06-02 VITALS
HEART RATE: 72 BPM | HEIGHT: 67 IN | BODY MASS INDEX: 27.47 KG/M2 | SYSTOLIC BLOOD PRESSURE: 112 MMHG | DIASTOLIC BLOOD PRESSURE: 68 MMHG | WEIGHT: 175 LBS

## 2020-06-02 DIAGNOSIS — N93.8 DUB (DYSFUNCTIONAL UTERINE BLEEDING): Primary | ICD-10-CM

## 2020-06-02 PROCEDURE — 99202 OFFICE O/P NEW SF 15 MIN: CPT | Performed by: OBSTETRICS & GYNECOLOGY

## 2020-06-02 ASSESSMENT — MIFFLIN-ST. JEOR: SCORE: 1481.42

## 2020-06-26 ENCOUNTER — VIRTUAL VISIT (OUTPATIENT)
Dept: FAMILY MEDICINE | Facility: OTHER | Age: 45
End: 2020-06-26

## 2020-06-27 NOTE — PROGRESS NOTES
"Date: 2020 23:19:13  Clinician: Joselin Kilgore  Clinician NPI: [id number]  Patient: Shauna Becerra  Patient : 1975  Patient Address: [address] [place] [id number]  Patient Phone: [phone]  Visit Protocol: URI  Patient Summary:  Shauna is a 45 year old ( : 1975 ) female who initiated a Visit for COVID-19 (Coronavirus) evaluation and screening. When asked the question \"Please sign me up to receive news, health information and promotions. \", Shauna responded \"No\".    Shauna states her symptoms started today.   Her symptoms consist of nausea and a cough.   Symptom details   Cough: Shauna coughs a few times an hour and her cough is not more bothersome at night. Phlegm does not come into her throat when she coughs. She believes her cough is caused by post-nasal drip.    Shauna denies having wheezing, teeth pain, ageusia, diarrhea, vomiting, rhinitis, malaise, ear pain, headache, chills, sore throat, enlarged lymph nodes, myalgias, anosmia, facial pain or pressure, fever, and nasal congestion. She also denies having recent facial or sinus surgery in the past 60 days and taking antibiotic medication in the past month. She is not experiencing dyspnea.   Precipitating events  She has not recently been exposed to someone with influenza. Shauna has been in close contact with the following high risk individuals: people with asthma, heart disease or diabetes and adults 65 or older.   Pertinent COVID-19 (Coronavirus) information  In the past 14 days, Shauna has not worked in a congregate living setting.   She does not work or volunteer as healthcare worker or a  and does not work or volunteer in a healthcare facility.   Shauna also has not lived in a congregate living setting in the past 14 days. She does not live with a healthcare worker.   Shauna has not had a close contact with a laboratory-confirmed COVID-19 patient within 14 days of symptom onset.   Pertinent " medical history  Shauna does not get yeast infections when she takes antibiotics.   Shauna does not need a return to work/school note.   Weight: 175 lbs   Shauna does not smoke or use smokeless tobacco.   She denies pregnancy and denies breastfeeding. She is currently menstruating.   Weight: 175 lbs    MEDICATIONS: Flovent HFA inhalation, valacyclovir oral, ALLERGIES: NKDA  Clinician Response:  Dear Shauna,  I am sorry you are not feeling well. Your health is our priority. Based on the information you have provided, it is possible that you may have some type of viral infection.  Please read the full treatment plan and see my recommendations below.  Medication information  Because you have a viral infection, antibiotics will not help you get better. Treating a viral infection with antibiotics could actually make you feel worse.  Self care  Steps you can take to be as comfortable as possible:     Rest.    Drink plenty of fluids.    Take a spoonful of honey to reduce your cough.     Additional treatment plan   Your symptoms show that you may have coronavirus (COVID-19). This illness can cause fever, cough and trouble breathing. Many people get a mild case and get better on their own. Some people can get very sick.  What should I do?  We would like to test you for this virus.   1. Please call [phone] to schedule your visit. Explain that you were referred by [facility] to have a COVID-19 test. Be ready to share your [facility] visit ID number.  The following will serve as your written order for this COVID Test, ordered by me, for the indication of suspected COVID [Z20.828]: The test will be ordered in [employer], our electronic health record, after you are scheduled. It will show as ordered and authorized by Arthur Talley MD.  Order: COVID-19 (Coronavirus) PCR for SYMPTOMATIC testing from [facility].      2. When it's time for your COVID test:  Stay at least 6 feet away from others. (If someone will drive you to your test, stay in  "the backseat, as far away from the  as you can.)   Cover your mouth and nose with a mask, tissue or washcloth.  Go straight to the testing site. Don't make any stops on the way there or back.      3.Starting now: Stay home and away from others (self-isolate) until:   You've had no fever---and no medicine that reduces fever---for 3 full days (72 hours). And...   Your other symptoms have gotten better. For example, your cough or breathing has improved. And...   At least 10 days have passed since your symptoms started.       During this time, don't leave the house except for testing or medical care.   Stay in your own room, even for meals. Use your own bathroom if you can.   Stay away from others in your home. No hugging, kissing or shaking hands. No visitors.  Don't go to work, school or anywhere else.    Clean \"high touch\" surfaces often (doorknobs, counters, handles, etc.). Use a household cleaning spray or wipes. You'll find a full list of  on the EPA website: www.epa.gov/pesticide-registration/list-n-disinfectants-use-against-sars-cov-2.   Cover your mouth and nose with a mask, tissue or washcloth to avoid spreading germs.  Wash your hands and face often. Use soap and water.  Caregivers in these groups are at risk for severe illness due to COVID-19:  o People 65 years and older  o People who live in a nursing home or long-term care facility  o People with chronic disease (lung, heart, cancer, diabetes, kidney, liver, immunologic)  o People who have a weakened immune system, including those who:   Are in cancer treatment  Take medicine that weakens the immune system, such as corticosteroids  Had a bone marrow or organ transplant  Have an immune deficiency  Have poorly controlled HIV or AIDS  Are obese (body mass index of 40 or higher)  Smoke regularly   o Caregivers should wear gloves while washing dishes, handling laundry and cleaning bedrooms and bathrooms.  o Use caution when washing and drying " laundry: Don't shake dirty laundry, and use the warmest water setting that you can.  o For more tips, go to www.cdc.gov/coronavirus/2019-ncov/downloads/10Things.pdf.      How can I take care of myself?   Get lots of rest. Drink extra fluids (unless a doctor has told you not to).   Take Tylenol (acetaminophen) for fever or pain. If you have liver or kidney problems, ask your family doctor if it's okay to take Tylenol.   Adults can take either:    650 mg (two 325 mg pills) every 4 to 6 hours, or...   1,000 mg (two 500 mg pills) every 8 hours as needed.    Note: Don't take more than 3,000 mg in one day. Acetaminophen is found in many medicines (both prescribed and over-the-counter medicines). Read all labels to be sure you don't take too much.   For children, check the Tylenol bottle for the right dose. The dose is based on the child's age or weight.    If you have other health problems (like cancer, heart failure, an organ transplant or severe kidney disease): Call your specialty clinic if you don't feel better in the next 2 days.       Know when to call 911. Emergency warning signs include:    Trouble breathing or shortness of breath Pain or pressure in the chest that doesn't go away Feeling confused like you haven't felt before, or not being able to wake up Bluish-colored lips or face.  Where can I get more information?   [facility] -- About COVID-19: [email or web address]   CDC -- What to Do If You're Sick: www.cdc.gov/coronavirus/2019-ncov/about/steps-when-sick.html   CDC -- Ending Home Isolation: www.cdc.gov/coronavirus/2019-ncov/hcp/disposition-in-home-patients.html   CDC -- Caring for Someone: www.cdc.gov/coronavirus/2019-ncov/if-you-are-sick/care-for-someone.html   [facility] -- Interim Guidance for Hospital Discharge to Home: www.health.[facility].[place]/diseases/coronavirus/hcp/hospdischarge.pdf   [facility] clinical trials (COVID-19 research studies):  clinicalaffairs.[facility]-clinical-trials    Below are the COVID-19 hotlines at the [place] Department of Health ([facility]). Interpreters are available.    For health questions: Call 615-453-5190 or 1-143.677.9789 (7 a.m. to 7 p.m.) For questions about schools and childcare: Call 458-753-9468 or 1-196.872.4051 (7 a.m. to 7 p.m.)    COVID-19 (Coronavirus) General Information  Because there is currently no vaccine to prevent infection, the best way to protect yourself is to avoid being exposed to this virus. Common symptoms of COVID-19 include but are not limited to fever, cough, and shortness of breath. These symptoms appear 2-14 days after you are exposed to the virus that causes COVID-19. Click here for more information from the CDC on how to protect yourself.  If you are sick with COVID-19 or suspect you are infected with the virus that causes COVID-19, follow the steps here from the CDC to help prevent the disease from spreading to people in your home and community.  Click here for general information from the CDC on testing.  If you develop any of these emergency warning signs for COVID-19, get medical attention immediately:     Trouble breathing    Persistent pain or pressure in the chest    New confusion or inability to arouse    Bluish lips or face      Call your doctor or clinic before going in. Call [phone] if you have a medical emergency and notify the  you have or think you may have COVID-19.  For more detailed and up to date information on COVID-19 (Coronavirus), please visit the CDC website.   Diagnosis: Cough  Diagnosis ICD: R05

## 2020-06-28 ENCOUNTER — APPOINTMENT (OUTPATIENT)
Dept: URGENT CARE | Facility: URGENT CARE | Age: 45
End: 2020-06-28
Payer: COMMERCIAL

## 2020-08-10 ENCOUNTER — VIRTUAL VISIT (OUTPATIENT)
Dept: FAMILY MEDICINE | Facility: OTHER | Age: 45
End: 2020-08-10

## 2020-08-10 NOTE — PROGRESS NOTES
"Date: 08/10/2020 13:18:58  Clinician: Radha Aleman  Clinician NPI: [id number]  Patient: Shauna Becerra  Patient : 1975  Patient Address: [address] [place] [address]  Patient Phone: [phone]  Visit Protocol: URI  Patient Summary:  Shauna is a 45 year old ( : 1975 ) female who initiated a Visit for COVID-19 (Coronavirus) evaluation and screening. When asked the question \"Please sign me up to receive news, health information and promotions. \", Shauna responded \"No\".    Shauna states her symptoms started today.   Her symptoms consist of. Shauna also feels feverish.   Symptom details   Temperature: Her current temperature is 99.3 degrees Fahrenheit.    Shauna denies having ear pain, headache, chills, enlarged lymph nodes, nausea, sore throat, teeth pain, ageusia, diarrhea, cough, nasal congestion, vomiting, rhinitis, myalgias, anosmia, facial pain or pressure, wheezing, and malaise. She also denies taking antibiotic medication in the past month and having recent facial or sinus surgery in the past 60 days. She is not experiencing dyspnea.    Pertinent COVID-19 (Coronavirus) information  In the past 14 days, Shauna has not worked in a congregate living setting.   She does not work or volunteer as healthcare worker or a  and does not work or volunteer in a healthcare facility.   Shauna also has not lived in a congregate living setting in the past 14 days. She does not live with a healthcare worker.   Shauna has not had a close contact with a laboratory-confirmed COVID-19 patient within 14 days of symptom onset.   Since 2019, Shauna and has not had upper respiratory infection or influenza-like illness. Has not been diagnosed with lab-confirmed COVID-19 test   Pertinent medical history  Shauna does not get yeast infections when she takes antibiotics.   Shauna does not need a return to work/school note.   Weight: 175 lbs   Shauna does not smoke or use " smokeless tobacco.   She denies pregnancy and denies breastfeeding. She has menstruated in the past month.   Additional information as reported by the patient (free text): I had chills last night for a bit but might have just been cold since the AC and fan were going. A little tightness in my chest, but consistent with asthma acting up as well.   Weight: 175 lbs    MEDICATIONS: albuterol sulfate inhalation, Flovent HFA inhalation, valacyclovir oral, ALLERGIES: NKDA  Clinician Response:  Dear Shauna,   Your symptoms show that you may have coronavirus (COVID-19). This illness can cause fever, cough and trouble breathing. Many people get a mild case and get better on their own. Some people can get very sick.  What should I do?  We would like to test you for this virus.   1. Please call [phone] to schedule your visit. Explain that you were referred by [facility] to have a COVID-19 test. Be ready to [facility].  The following will serve as your written order for this COVID Test, ordered by me, for the indication of suspected COVID [Z20.828]: The test will be ordered in [employer], our electronic health record, after you are scheduled. It will show as ordered and authorized by Arthur Talley MD.  Order: COVID-19 (Coronavirus) PCR for SYMPTOMATIC testing from [facility].      2. When it's time for your COVID test:  Stay at least 6 feet away from others. (If someone will drive you to your test, stay in the backseat, as far away from the  as you can.)   Cover your mouth and nose with a mask, tissue or washcloth.  Go straight to the testing site. Don't make any stops on the way there or back.      3.Starting now: Stay home and away from others (self-isolate) until:   You've had no fever---and no medicine that reduces fever---for one full day (24 hours). And...   Your other symptoms have gotten better. For example, your cough or breathing has improved. And...   At least 10 days have passed since your symptoms  "started.       During this time, don't leave the house except for testing or medical care.   Stay in your own room, even for meals. Use your own bathroom if you can.   Stay away from others in your home. No hugging, kissing or shaking hands. No visitors.  Don't go to work, school or anywhere else.    Clean \"high touch\" surfaces often (doorknobs, counters, handles, etc.). Use a household cleaning spray or wipes. You'll find a full list of  on the EPA website: www.epa.gov/pesticide-registration/list-n-disinfectants-use-against-sars-cov-2.   Cover your mouth and nose with a mask, tissue or washcloth to avoid spreading germs.  Wash your hands and face often. Use soap and water.  Caregivers in these groups are at risk for severe illness due to COVID-19:  o People 65 years and older  o People who live in a nursing home or long-term care facility  o People with chronic disease (lung, heart, cancer, diabetes, kidney, liver, immunologic)  o People who have a weakened immune system, including those who:   Are in cancer treatment  Take medicine that weakens the immune system, such as corticosteroids  Had a bone marrow or organ transplant  Have an immune deficiency  Have poorly controlled HIV or AIDS  Are obese (body mass index of 40 or higher)  Smoke regularly   o Caregivers should wear gloves while washing dishes, handling laundry and cleaning bedrooms and bathrooms.  o Use caution when washing and drying laundry: Don't shake dirty laundry, and use the warmest water setting that you can.  o For more tips, go to www.cdc.gov/coronavirus/2019-ncov/downloads/10Things.pdf.    4.Sign up for [employer]. We know it's scary to hear that you might have COVID-19. We want to track your symptoms to make sure you're okay over the next 2 weeks. Please look for an email from Merissa Mcghee---this is a free, online program that we'll use to keep in touch. To sign up, follow the link in the email. Learn more at " [email or web address]  How can I take care of myself?   Get lots of rest. Drink extra fluids (unless a doctor has told you not to).   Take Tylenol (acetaminophen) for fever or pain. If you have liver or kidney problems, ask your family doctor if it's okay to take Tylenol.   Adults can take either:    650 mg (two 325 mg pills) every 4 to 6 hours, or...   1,000 mg (two 500 mg pills) every 8 hours as needed.    Note: Don't take more than 3,000 mg in one day. Acetaminophen is found in many medicines (both prescribed and over-the-counter medicines). Read all labels to be sure you don't take too much.   For children, check the Tylenol bottle for the right dose. The dose is based on the child's age or weight.    If you have other health problems (like cancer, heart failure, an organ transplant or severe kidney disease): Call your specialty clinic if you don't feel better in the next 2 days.       Know when to call 911. Emergency warning signs include:    Trouble breathing or shortness of breath Pain or pressure in the chest that doesn't go away Feeling confused like you haven't felt before, or not being able to wake up Bluish-colored lips or face.  Where can I get more information?   [facility] -- About COVID-19: [email or web address]   CDC -- What to Do If You're Sick: www.cdc.gov/coronavirus/2019-ncov/about/steps-when-sick.html   CDC -- Ending Home Isolation: www.cdc.gov/coronavirus/2019-ncov/hcp/disposition-in-home-patients.html   CDC -- Caring for Someone: www.cdc.gov/coronavirus/2019-ncov/if-you-are-sick/care-for-someone.html   [facility] -- Interim Guidance for Hospital Discharge to Home: www.health.[facility].[place]/diseases/coronavirus/hcp/hospdischarge.pdf   [facility] clinical trials (COVID-19 research studies): clinicalaffairs.[facility]-clinical-trials    Below are the COVID-19 hotlines at the [place] Department of Health ([facility]). Interpreters are available.    [facility]  questions: Call [phone] or [phone] (7 a.m. to 7 p.m.) For questions about schools and childcare: Call [phone] or [phone] (7 a.m. to 7 p.m.)    Diagnosis: Fever, unspecified  Diagnosis ICD: R50.9

## 2020-08-12 DIAGNOSIS — Z20.822 SUSPECTED 2019 NOVEL CORONAVIRUS INFECTION: Primary | ICD-10-CM

## 2020-08-13 LAB
SARS-COV-2 RNA SPEC QL NAA+PROBE: NOT DETECTED
SPECIMEN SOURCE: NORMAL

## 2020-10-28 ENCOUNTER — VIRTUAL VISIT (OUTPATIENT)
Dept: FAMILY MEDICINE | Facility: OTHER | Age: 45
End: 2020-10-28

## 2020-10-29 NOTE — PROGRESS NOTES
"Date: 10/28/2020 19:47:50  Clinician: Tu Way  Clinician NPI: [id number]  Patient: Shauna Becerra  Patient : 1975  Patient Address: [address] [place] [address]  Patient Phone: [phone]  Visit Protocol: UTI  Patient Summary:  Shauna is a 45 year old ( : 1975 ) female who initiated a [facility] Visit for a presumed bladder infection. When asked the question \"Please sign me up to receive news, health information and promotions. \", Shauna responded \"Yes\".   Her symptoms started 1-3 days ago and consist of dysuria, urinary frequency, and urgency.   Symptom details   Urine color: The color of her urine is yellow.    Denied symptoms include urinary incontinence, vomiting, vaginal itching, vaginal discharge, foul-smelling urine, nausea, feeling as if the bladder is never empty, flank pain, abdominal pain, and chills. She does not feel feverish.   Shauna has not used any over-the-counter medications or home remedies to relieve her current symptoms.  Precipitating events  Shauna denies having a sexually transmitted disease.  Pertinent medical history  Shauna has had a bladder infection before but has not had any in the past 12 months. Her current symptoms are similar to her previous bladder infection symptoms.   She is not sure what antibiotics have been effective in treating her past bladder infections.   Shauna does not get yeast infections when she takes antibiotics and has not been prescribed antibiotics to prevent frequent or repeated bladder infections in the past. She has not experienced problems or side effects with any of the common antibiotics used to treat bladder infections.   Shauna does not have a history of kidney stones. She has not used a catheter or been a patient in a hospital or nursing home in the past 2 weeks.   Shauna does not smoke or use smokeless tobacco.   She denies pregnancy and denies breastfeeding. She is currently menstruating.   Additional " information as reported by the patient (free text): I thought I had a UTI a few weeks back and drank a bunch of water and seemed better, but back again. Experiencing a tingling sensation in urethra (worse after urinating and before).     MEDICATIONS: albuterol sulfate inhalation, Flovent HFA inhalation, valacyclovir oral, ALLERGIES: NKDA  Clinician Response:  Deasundar Villatoro,  Based on the information you have provided, you likely have an acute urinary tract infection, also called a bladder infection. Bladder infections occur when bacteria from the outside of the body enters the urinary tract. Any part of the urinary system can be infected, but the bladder is the most common.  Medication information  I am prescribing:     Nitrofurantoin monohyd/m-cryst (Macrobid) 100 mg oral capsule. Take 1 capsule by mouth every 12 hours for 5 days. Take this medication with food. There are no refills with this prescription.   The medication I prescribed for your bladder infection is an antibiotic. Continue taking the medication until it is gone even if you feel better.   Yeast infections can be a common side effect of antibiotics. The most common symptom of a yeast infection is itchiness in and around the vagina. Other signs and symptoms include burning, redness, or a thick, white vaginal discharge that looks like cottage cheese and does not have a bad smell.  Self care  Urination helps to flush bacteria from the urinary tract. For this reason, drinking water and urinating often helps relieve some urinary symptoms and can decrease your risk of getting bladder infections in the future.  Other steps you can take to prevent future bladder infections include:     Wipe front to back after using the bathroom    Urinate after sexual intercourse    Avoid using deodorant sprays, douches, or powders in the vaginal area     When to seek care  Please make an appointment to be seen in a clinic or urgent care if any of the following occur:     You  develop new symptoms or your symptoms become worse    You have medication side effects that make it difficult to take them as prescribed    Your symptoms do not improve within 1-2 days of starting treatment    You have symptoms of a bladder infection that return shortly after completing treatment     It is possible to have an allergic reaction to an antibiotic even if you have not had one in the past. If you notice a new rash, significant swelling, or difficulty breathing, stop taking this medication immediately and go to a clinic or urgent care.   Diagnosis: Acute uncomplicated bladder infection  Diagnosis ICD: N39.0  Prescription: nitrofurantoin monohyd/m-cryst (Macrobid) 100 mg oral capsule 10 capsule, 5 days supply. Take 1 capsule by mouth every 12 hours for 5 days. Refills: 0, Refill as needed: no, Allow substitutions: yes  Ph[facility]: [id number] - [phone] - [address] [facility],  [place] [address]

## 2020-11-29 ENCOUNTER — HEALTH MAINTENANCE LETTER (OUTPATIENT)
Age: 45
End: 2020-11-29

## 2020-12-15 ENCOUNTER — VIRTUAL VISIT (OUTPATIENT)
Dept: ONCOLOGY | Facility: CLINIC | Age: 45
End: 2020-12-15
Attending: GENETIC COUNSELOR, MS
Payer: COMMERCIAL

## 2020-12-15 DIAGNOSIS — Z80.0 FAMILY HISTORY OF PANCREATIC CANCER: ICD-10-CM

## 2020-12-15 DIAGNOSIS — Z80.3 FAMILY HISTORY OF MALIGNANT NEOPLASM OF BREAST: Primary | ICD-10-CM

## 2020-12-15 DIAGNOSIS — Z80.49 FAMILY HISTORY OF UTERINE CANCER: ICD-10-CM

## 2020-12-15 PROCEDURE — 96040 HC GENETIC COUNSELING, EACH 30 MINUTES: CPT | Mod: GT | Performed by: GENETIC COUNSELOR, MS

## 2020-12-15 NOTE — LETTER
"    12/15/2020         RE: Shauna Becerra  [address]  [place] [facility] [address]        Dear Colleague,    Thank you for referring your patient, Shauna Becerra, to the [facility]. Please see a copy of my visit note below.    12/15/2020    Shauna Becerra is a 45 year old female who is being evaluated via a billable video visit.      The patient has been notified of following:     \"This video visit will be conducted via a call between you and your provider. We have found that certain health care needs can be provided without the need for an in-person physical exam. This service lets us provide the care you need with a video conversation. If lab work is needed we can place an order for that and you can then stop by our lab to have the test done at a later time.    Video visits are billed at different rates depending on your insurance coverage. Please reach out to your insurance provider with any questions.    If during the course of the call the provider feels a video visit is not appropriate, you will not be charged for this service.\"    Patient has given verbal consent for Video visit? Yes  How would you like to obtain your AVS? MyChart  If you are dropped from the video visit, the video invite should be resent to: Text to cell phone: [phone]  Will anyone else be joining your video visit? No    Referring Provider: self-referred    Presenting Information:   Given concerns regarding the potential for COVID-19 exposure during a clinic visit, Shauna elected for a video genetic counseling visit through the [facility] to discuss her family history of breast, pancreatic, and uterine cancer. We reviewed this history, cancer screening recommendations, and available genetic testing options.    We originally met on 10/16/2018, at which time Shauna expressed interest in genetic testing but declined pursuing testing due to extenuating circumstances. " Please see clinic note for additional details.    Updated Personal History:  Shauna is a 45 year old female. She does not have any personal history of cancer.    She had her first menstrual period at age 15, her first child at age 41, and is perimenopausal. Shauna has her ovaries, fallopian tubes and uterus in place, and she has had no ovarian cancer screening to date. She reports that she has never used hormone replacement therapy.      She has annual mammograms and she had a mammogram in April 2020 after identify a left breast lump, which was normal. Shauna has not had a colonoscopy and does not regularly do any other cancer screening at this time.    Updated Family History: (Please see scanned pedigree for detailed family history information)    Shauna's maternal aunt with breast cancer may have pursued genetic testing of the BRCA1 and BRCA2 genes (which was negative/normal), but further details are unknown.    Shauna clarified that her paternal grandmother was diagnosed with breast and uterine cancer after the age of 50 and pancreatic cancer around age 75.    She reports that there are no other updates or changes to her family history.    Discussion:    We again reviewed the features of sporadic, familial, and hereditary cancers. In looking at Shauna's family history, it is possible that a cancer susceptibility gene is present as she has several relatives on each side of her family diagnosed with potentially related cancers (breast, pancreatic, uterine) in several generations; her paternal grandmother was also diagnosed with multiple primary cancers.    We discussed the natural history and genetics of several hereditary cancer syndromes, including Miranda syndrome and Hereditary Breast and Ovarian Cancer (HBOC) syndrome.     We discussed that Miranda syndrome can be caused by a mutation in one of five genes:  MLH1, MSH2, MSH6, PMS2, and EPCAM. The highest cancer risks associated with Miranda syndrome include  colon, uterine, gastric, and ovarian cancer. Other cancers have also been reported with Miranda syndrome, including pancreatic and breast cancer.    We then reviewed that the most common cause of hereditary pancreatic and breast cancer is HBOC syndrome, which is caused by mutations in the BRCA1 and BRCA2 genes. Individuals with HBOC syndrome are at increased risk for several different cancers, including breast, ovarian, male breast, prostate, melanoma, and pancreatic cancer.    Based on her family history, Shauna meets current National Comprehensive Cancer Network (NCCN) criteria for genetic testing of BRCA1, BRCA2, and other high penetrance breast, ovarian, and pancreatic cancer genes (LYNN, PALB2, CDH1, CDKN2A, PTEN, STK11, TP53, Miranda syndrome genes, etc.).    A detailed handout regarding these genes/syndromes and the information we discussed will be provided to Shauna via APR and can be found in the after visit summary. Topics included: inheritance pattern, cancer risks, cancer screening recommendations, and also risks, benefits and limitations of testing.    We discussed that there are additional genes that could cause increased risk for the cancers in Shauna's family. As many of these genes present with overlapping features in a family and accurate cancer risk cannot always be established based upon the pedigree analysis alone, it would be reasonable for Shauna to consider panel genetic testing to analyze multiple genes at once.    We reviewed genetic testing options for hereditary breast, gynecologic, and gastrointestinal cancers: actionable breast/gyn/GI cancers custom panel (CustomNext-Cancer, 30 genes) and expanded multi-cancer panel (CancerNext, 36 genes). Shauna expressed interest in learning as much information as possible from the testing. She opted for the CancerNext panel.    Genetic testing is available for 36 genes associated with hereditary cancer: CancerNext (APC, LYNN, AXIN2, BARD1, BMPR1A,  BRCA1, BRCA2, BRIP1, CDH1, CDK4, CDKN2A, CHEK2, DICER1, EPCAM, GREM1, HOXB13, MLH1, MSH2, MSH3, MSH6, MUTYH, NBN, NF1, NTHL1, PALB2, PMS2, POLD1, POLE, PTEN, RAD51C, RAD51D, RECQL, SMAD4, SMARCA4, STK11, and TP53).    We discussed that many of the genes in the CancerNext panel are associated with specific hereditary cancer syndromes and published management guidelines: Hereditary Breast and Ovarian Cancer syndrome (BRCA1, BRCA2), Miranda syndrome (MLH1, MSH2, MSH6, PMS2, EPCAM), Familial Adenomatous Polyposis (APC), Hereditary Diffuse Gastric Cancer (CDH1), Familial Atypical Multiple Mole Melanoma syndrome (CDK4, CDKN2A), Juvenile Polyposis syndrome (BMPR1A, SMAD4), Cowden syndrome (PTEN), Li Fraumeni syndrome (TP53), Peutz-Jeghers syndrome (STK11), MUTYH Associated Polyposis (MUTYH), and Neurofibromatosis type 1 (NF1).     The LYNN, AXIN2, BRIP1, CHEK2, GREM1, MSH3, NBN, NTHL1, PALB2, POLD1, POLE, RAD51C, and RAD51D genes are associated with increased cancer risk and have published management guidelines for certain cancers.      The remaining genes (BARD1, DICER1, HOXB13, RECQL, and SMARCA4) are associated with increased cancer risk and may allow us to make medical recommendations when mutations are identified.      Consent was obtained over the video. Shauna requested that a saliva collection kit be shipped to her home directly. Once her saliva sample is collected, genetic testing using the CancerNext panel will be sent to [facility]. Turn around time: 3-4 weeks after [facility] receives her saliva sample.    Medical Management: For Shauna, we reviewed that the information from genetic testing may determine:    additional cancer screening for which Shauna may qualify (i.e. mammogram and breast MRI, more frequent colonoscopies, regular abdominal imaging, more frequent dermatologic exams, etc.),    options for risk reducing surgeries Shauna could consider (i.e. bilateral mastectomy, surgery to remove her  ovaries and/or uterus, etc.),      and targeted chemotherapies if she were to develop certain cancers in the future (i.e. immunotherapy for individuals with Miranda syndrome, PARP inhibitors, etc.).     These recommendations and possible targeted chemotherapies will be discussed in detail once genetic testing is completed.     Plan:  1) Today Shauna elected to proceed with genetic testing using the CancerNext panel offered by [employer]. A saliva collection kit will be shipped to her home directly.  2) The results should be available in 3-4 weeks after Tomás receives her saliva sample.  3) Shauna will be called to discuss the results.    Ninfa Law MS, [facility]  Licensed Genetic Counselor  Office: [phone]  Pager: [phone]    Video-Visit Details  Type of service:  Video Visit  Video Start Time: 2:13pm (audio did not work until 2:18pm)  Video End Time: 3:03pm  Originating Location (pt. Location): Home  Distant Location (provider location):  [facility]   Platform used for Video Visit: [employer]Well      Again, thank you for allowing me to participate in the care of your patient.        Sincerely,        Ninfa Law,

## 2020-12-15 NOTE — PROGRESS NOTES
"12/15/2020    Shauna Becerra is a 45 year old female who is being evaluated via a billable video visit.      The patient has been notified of following:     \"This video visit will be conducted via a call between you and your provider. We have found that certain health care needs can be provided without the need for an in-person physical exam. This service lets us provide the care you need with a video conversation. If lab work is needed we can place an order for that and you can then stop by our lab to have the test done at a later time.    Video visits are billed at different rates depending on your insurance coverage. Please reach out to your insurance provider with any questions.    If during the course of the call the provider feels a video visit is not appropriate, you will not be charged for this service.\"    Patient has given verbal consent for Video visit? Yes  How would you like to obtain your AVS? MyChart  If you are dropped from the video visit, the video invite should be resent to: Text to cell phone: [phone]  Will anyone else be joining your video visit? No    Referring Provider: self-referred    Presenting Information:   Given concerns regarding the potential for COVID-19 exposure during a clinic visit, Shauna elected for a video genetic counseling visit through the Cancer Risk Management Program to discuss her family history of breast, pancreatic, and uterine cancer. We reviewed this history, cancer screening recommendations, and available genetic testing options.    We originally met on 10/16/2018, at which time Shauna expressed interest in genetic testing but declined pursuing testing due to extenuating circumstances. Please see clinic note for additional details.    Updated Personal History:  Shauna is a 45 year old female. She does not have any personal history of cancer.    She had her first menstrual period at age 15, her first child at age 41, and is perimenopausal. Shauna has " her ovaries, fallopian tubes and uterus in place, and she has had no ovarian cancer screening to date. She reports that she has never used hormone replacement therapy.      She has annual mammograms and she had a mammogram in April 2020 after identify a left breast lump, which was normal. Shauna has not had a colonoscopy and does not regularly do any other cancer screening at this time.    Updated Family History: (Please see scanned pedigree for detailed family history information)    Shauna's maternal aunt with breast cancer may have pursued genetic testing of the BRCA1 and BRCA2 genes (which was negative/normal), but further details are unknown.    Shauna clarified that her paternal grandmother was diagnosed with breast and uterine cancer after the age of 50 and pancreatic cancer around age 75.    She reports that there are no other updates or changes to her family history.    Discussion:    We again reviewed the features of sporadic, familial, and hereditary cancers. In looking at Shauna's family history, it is possible that a cancer susceptibility gene is present as she has several relatives on each side of her family diagnosed with potentially related cancers (breast, pancreatic, uterine) in several generations; her paternal grandmother was also diagnosed with multiple primary cancers.    We discussed the natural history and genetics of several hereditary cancer syndromes, including Miranda syndrome and Hereditary Breast and Ovarian Cancer (HBOC) syndrome.     We discussed that Miranda syndrome can be caused by a mutation in one of five genes:  MLH1, MSH2, MSH6, PMS2, and EPCAM. The highest cancer risks associated with Miranda syndrome include colon, uterine, gastric, and ovarian cancer. Other cancers have also been reported with Miranda syndrome, including pancreatic and breast cancer.    We then reviewed that the most common cause of hereditary pancreatic and breast cancer is HBOC syndrome, which is caused by  mutations in the BRCA1 and BRCA2 genes. Individuals with HBOC syndrome are at increased risk for several different cancers, including breast, ovarian, male breast, prostate, melanoma, and pancreatic cancer.    Based on her family history, Shauna meets current National Comprehensive Cancer Network (NCCN) criteria for genetic testing of BRCA1, BRCA2, and other high penetrance breast, ovarian, and pancreatic cancer genes (LYNN, PALB2, CDH1, CDKN2A, PTEN, STK11, TP53, Miranda syndrome genes, etc.).    A detailed handout regarding these genes/syndromes and the information we discussed will be provided to Shauna via [employer] and can be found in the after visit summary. Topics included: inheritance pattern, cancer risks, cancer screening recommendations, and also risks, benefits and limitations of testing.    We discussed that there are additional genes that could cause increased risk for the cancers in Shauna's family. As many of these genes present with overlapping features in a family and accurate cancer risk cannot always be established based upon the pedigree analysis alone, it would be reasonable for Shauna to consider panel genetic testing to analyze multiple genes at once.    We reviewed genetic testing options for hereditary breast, gynecologic, and gastrointestinal cancers: actionable breast/gyn/GI cancers custom panel (CustomNext-Cancer, 30 genes) and expanded multi-cancer panel (CancerNext, 36 genes). Shauna expressed interest in learning as much information as possible from the testing. She opted for the CancerNext panel.    Genetic testing is available for 36 genes associated with hereditary cancer: CancerNext (APC, LYNN, AXIN2, BARD1, BMPR1A, BRCA1, BRCA2, BRIP1, CDH1, CDK4, CDKN2A, CHEK2, DICER1, EPCAM, GREM1, HOXB13, MLH1, MSH2, MSH3, MSH6, MUTYH, NBN, NF1, NTHL1, PALB2, PMS2, POLD1, POLE, PTEN, RAD51C, RAD51D, RECQL, SMAD4, SMARCA4, STK11, and TP53).    We discussed that many of the genes in the  CancerNext panel are associated with specific hereditary cancer syndromes and published management guidelines: Hereditary Breast and Ovarian Cancer syndrome (BRCA1, BRCA2), Miranda syndrome (MLH1, MSH2, MSH6, PMS2, EPCAM), Familial Adenomatous Polyposis (APC), Hereditary Diffuse Gastric Cancer (CDH1), Familial Atypical Multiple Mole Melanoma syndrome (CDK4, CDKN2A), Juvenile Polyposis syndrome (BMPR1A, SMAD4), Cowden syndrome (PTEN), Li Fraumeni syndrome (TP53), Peutz-Jeghers syndrome (STK11), MUTYH Associated Polyposis (MUTYH), and Neurofibromatosis type 1 (NF1).     The LYNN, AXIN2, BRIP1, CHEK2, GREM1, MSH3, NBN, NTHL1, PALB2, POLD1, POLE, RAD51C, and RAD51D genes are associated with increased cancer risk and have published management guidelines for certain cancers.      The remaining genes (BARD1, DICER1, HOXB13, RECQL, and SMARCA4) are associated with increased cancer risk and may allow us to make medical recommendations when mutations are identified.      Consent was obtained over the video. Shauna requested that a saliva collection kit be shipped to her home directly. Once her saliva sample is collected, genetic testing using the CancerNext panel will be sent to [facility]. Turn around time: 3-4 weeks after Tomás receives her saliva sample.    Medical Management: For Shauna, we reviewed that the information from genetic testing may determine:    additional cancer screening for which Shauna may qualify (i.e. mammogram and breast MRI, more frequent colonoscopies, regular abdominal imaging, more frequent dermatologic exams, etc.),    options for risk reducing surgeries Shauna could consider (i.e. bilateral mastectomy, surgery to remove her ovaries and/or uterus, etc.),      and targeted chemotherapies if she were to develop certain cancers in the future (i.e. immunotherapy for individuals with Miranda syndrome, PARP inhibitors, etc.).     These recommendations and possible targeted chemotherapies  will be discussed in detail once genetic testing is completed.     Plan:  1) Today Shauna elected to proceed with genetic testing using the CancerNext panel offered by [employer]. A saliva collection kit will be shipped to her home directly.  2) The results should be available in 3-4 weeks after Tomás receives her saliva sample.  3) Shauna will be called to discuss the results.    Ninfa Law MS, [facility]  Licensed Genetic Counselor  Office: [phone]  Pager: [phone]    Video-Visit Details  Type of service:  Video Visit  Video Start Time: 2:13pm (audio did not work until 2:18pm)  Video End Time: 3:03pm  Originating Location (pt. Location): Home  Distant Location (provider location):  [facility]   Platform used for Video Visit: [employer]

## 2020-12-16 NOTE — PATIENT INSTRUCTIONS
Assessing Cancer Risk  Only about 5-10% of cancers are thought to be due to an inherited cancer susceptibility gene. These families often have:    Several people with the same or related types of cancer    Cancers diagnosed at a young age (before age 50)    Individuals with more than one primary cancer    Multiple generations of the family affected with cancer    Some people may be candidates for genetic testing of more than one gene.  For these families, genetic testing using a cancer panel may be offered.  These panels can test many genes at once known to increase the risk for pancreatic (and other) cancers: APC, LYNN, BRCA1, BRCA2, CDKN2A, EPCAM, MLH1, MSH2, MSH6, PALB2, PMS2, STK11, and TP53. The purpose of this handout is to serve as a brief summary of the pancreatic cancer risk genes and cancer syndrome with pancreatic cancer risks.     Hereditary Breast and Ovarian Cancer Syndrome  (BRCA1 and BRCA2)    A single mutation in one of the copies of BRCA1 or BRCA2 increases the risk for breast and ovarian cancer.  The risk for pancreatic cancer and melanoma may be slightly increased in some families. BRCA2 is considered the most common gene responsible for familial pancreatic cancer.    A person s ethnic background is also important to consider, as individuals of Ashkenazi Mandaeism ancestry have a higher chance of having a BRCA gene mutation.  There are three BRCA mutations that occur more frequently in this population.    Miranda Syndrome  (MLH1, MSH2, MSH6, PMS2, and EPCAM)    Currently five genes are known to cause Miranda Syndrome: MLH1, MSH2, MSH6, PMS2, and EPCAM.  A single mutation in one of the Miranda Syndrome genes increases the risk for colon, endometrial, ovarian, and stomach cancers.  Other cancers that occur less commonly in Miranda Syndrome include urinary tract cancers, brain cancers, and other cancers.  People who have Miranda Syndrome have up to a 6% risk of pancreatic cancer.     *Cancer risk varies  depending on Miranda syndrome gene found    Familial Atypical Multiple Mole Melanoma Syndrome  (CDKN2A)    Familial Atypical Multiple Mole Melanoma Syndrome (FAMMM) is caused by a single mutation in the CDKN2A gene. This gene used to be called p16. The lifetime risk for melanoma is between 58-92%5. People with FAMMM have increased risks for pancreatic cancer, and possibly other cancers.      People with FAMMM typically have many moles (more than 50), which can be atypical.The exact risk of pancreatic cancer can depend on a person s specific CDKN2A mutation. The risk for pancreatic cancer be as high as 60% depending on the mutation.    Peutz-Jeghers Syndrome  (STK11)    Peutz-Jeghers Syndrome is caused by a mutation in the STK11 gene. The main features of Peutz-Jeghers Syndrome are multiple hamartomatous colon polyps and blue pigmentation of the lips and oral mucosa. Cancers associated with Peutz-Jeghers Syndrome include: gastrointestinal, gynecological, lung, breast, and pancreatic cancer. Up to a 36% lifetime risk of developing pancreatic cancer has been reported for those with Peutz-Jeghers syndrome.     Li-Fraumeni Syndrome  (TP53)    Li-Fraumeni Syndrome (LFS) is a cancer predisposition syndrome caused by a mutation in the TP53 gene. A single mutation in one of the copies of TP53 increases the risk for multiple cancers. Individuals with LFS are at an increased risk for developing cancer at a young age. The lifetime risk for development of a LFS-associated cancer is 50% by age 30 and 90% by age 60.      Core Cancers: Sarcomas, Breast, Brain, Lung, Leukemias/Lymphomas, Adrenocortical carcinomas    Other Cancers: Gastrointestinal (including pancreatic), Thyroid, Skin, Genitourinary    Familial Adenomatous Polyposis Syndrome  (APC)    Familial Adenomatous Polyposis syndrome (FAP) is caused by a single mutation in the APC gene and is characterize by having over 100 adenomatous polyps in the colon and significantly  increased risk for colon cancer. These typically appear during adolescence. FAP is associated with other tumors in the thyroid, stomach, and duodenum. People with FAP have an increased lifetime pancreatic cancer risk over the general population.    Additional Genes  PALB2  Mutations in the PALB2 gene have been shown to increase the risk of breast cancer up to 58% in some families. PALB2 mutations have also been associated with increased risk for pancreatic cancer.  Individuals who inherit two PALB2 mutations--one from their mother and one from their father--have a condition called Fanconi Anemia.  This condition is associated with short stature, developmental delay, bone marrow failure, and increased risk for childhood cancers.    LYNN  Mutations in the LYNN gene typically increase the risk of breast cancer 2-4 times higher than an average woman. LYNN mutations have also been associated with an increased risk of pancreatic cancer. People who inherit an LYNN mutation from both their mother and father have a condition called ataxia-telangiectasia. Ataxia telangiectasia is associated with ataxia in childhood (trouble with balance and walking), telangiectasias (red or purple spotty clusters on the skin), involuntary movements, frequent infections, and increased risk of leukemia and lymphoma.     Inheritance    All of the genes reviewed above are inherited in an autosomal dominant pattern.  This means that if a parent has a mutation, each of their children will have a 50% chance of inheriting that same mutation.  Every child--male or female--would have a 50% chance of inheriting the mutation and being at increased risk for developing cancer.       https://r.nlm.nih.gov/primer/inheritance/inheritancepatterns    Mutations in some genes can occur de christophe, which means that a person s mutation occurred for the first time in them and was not inherited from a parent.  Now that they have the mutation, however, it can be passed on  to future generations.     Genetic Testing  Genetic testing involves a blood test and will look for any harmful mutations that are associated with increased cancer risk.  If possible, it is recommended that the person(s) who has had cancer be tested before other family members.  That person will give us the most useful information about whether or not a specific gene is associated with the cancer in the family.    Advantages and Disadvantages   There are advantages and disadvantages to genetic testing.  Advantages    May clarify your cancer risk and additional appropriate cancer screenings     Can help you make medical decisions    May explain the cancers in your family    May give useful information to your family members (if you share your results)     Disadvantages    Possible negative emotional impact of learning about inherited cancer risk    Uncertainty in interpreting a negative test result in some situations    Possible genetic discrimination concerns (see below)     Genetic Information Nondiscrimination Act (LAYLA)  LAYLA is a federal law that protects individuals from health insurance or employment discrimination based on a genetic test result.  There are currently no legal discrimination protections in terms of life insurance, long term care, or disability insurances.  Visit the National Human Genome Research [place] website to learn more: https://www.genome.gov/47246741/genetic-discrimination/    Questions to Think About Regarding Genetic Testing:    What effect will the test result have on me and my relationship with my family members if I have an inherited gene mutation?  If I don t have a gene mutation?    Should I share my test results, and how will my family react to this news, which may also affect them?    Are my children ready to learn new information that may one day affect their own health?    There are three possible results of genetic testing:    Positive--a harmful mutation was identified in  one or more of the genes    Negative--no mutation was identified in any of the genes on this panel    Variant of unknown significance (VUS)--a variation in one of the genes was identified, but it is unclear how this impacts cancer risk in the family  o Families with VUS results can contact their genetic counselor annually for updates     Reducing Cancer Risk  Recommendations are based upon an individual s genetic test result as well as their personal and family history of cancer. Pancreatic cancer screening may be available based on family history. Talk with your physician about screening options.     Cancer Resources      National Pancreatic Cancer Foundation: npcf.     Pancreatic Cancer Action Network: pancan.org     FORCE: Facing Our Risk of Cancer Empowered: facingourrisk.org    [place]: JourneyPurek.org    Li-Fraumeni Syndrome Association: lfsassociation.org    Collaborative Group of the Americas on Inherited Colorectal Cancer (CGA)   o cgaicc.com http://www.facingInsignia Technologies.org/    Cancer Care: cancercare.org    American Cancer Society (ACS): cancer.org    National Cancer [place] (NCI): cancer.gov      Please call us if you have any questions or concerns.   Cancer Risk Management Program [phone] ([phone])  ? Nicko Wharton, MS, [facility] [phone]  ? Darya Hoyos, MS, [facility]  [phone]  ? Leeann Zimmer, MS, [facility]  [phone]  ? Ephraim Talley, MS, [facility]  [phone]  ? Ninfa Law, MS, [facility] [phone]  ? Barb Nguyen, MS, [facility] [phone]   ? Pam Ny, MS, [facility]  [phone]    References  1. Genetic / Familial High-Risk Assessment?: Breast and Ovarian. NCCN Pract Guidel Oncol. 2017;1.2018.  2. Ramez J, Juan R A, Diana J, et al. The incidence of pancreatic cancer in BRCA1 and BRCA2 mutation carriers. Br J Cancer. 2012;107(12):7959-6682. doi:10.1038/bjc.2012.483.  3. Dima DB, Ector KG, Sonia S, et al. BRCA1, BRCA2, PALB2, and CDKN2A mutations in familial pancreatic cancer: a PACGENE study.  Mandie Med. 2015;17(7):569-577. doi:10.1038/gim.2014.153.  4. [place] G. Genetic / Familial High-Risk Assessment?: Colorectal. NCCN Pract Guidel Oncol. 2017;2.2017.  5. Robert CASTAÑEDA,  M, Timothy E, Chyna SANCHES. Familial Atypical Multiple Mole Melanoma Syndrome. National Center for KLab Information (); 2009. http://www.ncbi.nlm.nih.gov/pubmed/37196039. Accessed October 10, 2017.  6. Miranda HT, Ashli RM, Juanito J, et al. Tumour spectrum in the FAMMM syndrome. Br J Cancer. 1981;44(4):553-560. http://www.ncbi.nlm.nih.gov/pubmed/1830383. Accessed October 10, 2017.  7. Raquel F, Werner J, Yemi A, et al. Very high risk of cancer in familial Peutz-Jeghers syndrome. Gastroenterology. 2000;119(6):1615-1038. doi:10.1053/SOPHIA.2000.42356.  8. Ginancy FM, Offerhaus GJ, Mayank DH, et al. Increased risk of thyroid and pancreatic carcinoma in familial adenomatous polyposis. Gut. 1993;34(10):2889-5377. doi:10.1136/GUT.34.10.1394.

## 2020-12-29 DIAGNOSIS — Z80.49 FAMILY HISTORY OF UTERINE CANCER: ICD-10-CM

## 2020-12-29 DIAGNOSIS — Z80.3 FAMILY HISTORY OF MALIGNANT NEOPLASM OF BREAST: ICD-10-CM

## 2020-12-29 DIAGNOSIS — Z80.0 FAMILY HISTORY OF PANCREATIC CANCER: ICD-10-CM

## 2021-01-06 ENCOUNTER — MYC MEDICAL ADVICE (OUTPATIENT)
Dept: FAMILY MEDICINE | Facility: CLINIC | Age: 46
End: 2021-01-06

## 2021-01-06 NOTE — TELEPHONE ENCOUNTER
Referrals  Please see pt msg and advise if pt can go to this place.     Thanks!     Alyx Valentine RN

## 2021-01-12 NOTE — TELEPHONE ENCOUNTER
"See 01/11/2021 Mobile [employer] message with two options for \"FUNCTIONAL NUTRITIONIST\".  Dr. Melisa Weinstein and Dr. Charo Randle.    FREDERICK [facility] Referral Rep  "

## 2021-01-18 LAB
LAB SCANNED RESULT: NORMAL
MISCELLANEOUS TEST: NORMAL

## 2021-04-06 ENCOUNTER — VIRTUAL VISIT (OUTPATIENT)
Dept: ONCOLOGY | Facility: CLINIC | Age: 46
End: 2021-04-06
Attending: GENETIC COUNSELOR, MS
Payer: COMMERCIAL

## 2021-04-06 DIAGNOSIS — Z80.3 FAMILY HISTORY OF MALIGNANT NEOPLASM OF BREAST: Primary | ICD-10-CM

## 2021-04-06 DIAGNOSIS — Z80.0 FAMILY HISTORY OF PANCREATIC CANCER: ICD-10-CM

## 2021-04-06 DIAGNOSIS — Z80.49 FAMILY HISTORY OF UTERINE CANCER: ICD-10-CM

## 2021-04-06 PROCEDURE — 96040 HC GENETIC COUNSELING, EACH 30 MINUTES: CPT | Performed by: GENETIC COUNSELOR, MS

## 2021-04-06 NOTE — LETTER
4/6/2021         RE: Shauna Becerra  [address]  [facility] [address]        Dear Colleague,    Thank you for referring your patient, Shauna Becerra, to the [facility]. Please see a copy of my visit note below.    4/6/2021    Shauna is a 46 year old who is being evaluated via a billable video visit.      How would you like to obtain your AVS? MyChart  If the video visit is dropped, the invitation should be resent by: Text to cell phone: [phone]  Will anyone else be joining your video visit? No    Video-Visit Details  Type of service:  Video Visit  Video Start Time: 1:13pm  Video End Time:1:42pm  Originating Location (pt. Location): Home  Distant Location (provider location):  [facility]  Platform used for Video Visit: [employer]    Referring Provider: self-referred    Presenting Information:  I spoke to Shauna by video today to discuss her genetic testing results. We last met on 12/15/2020 and her saliva sample was collected on 12/29/2020. BRCA1/2 and Miranda Syndrome Analyses with the CancerNext panel was ordered from [facility]. This testing was done because of Shauna's family history of breast, pancreatic, and uterine cancer. Of note, Shauna requested today's date to review her test results due to a change in insurance coverage.    Genetic Testing Result: NEGATIVE  Shauna is negative for mutations in the APC, LYNN, AXIN2, BARD1, BMPR1A, BRCA1, BRCA2, BRIP1, CDH1, CDK4, CDKN2A, CHEK2, DICER1, EPCAM, GREM1, HOXB13, MLH1, MSH2, MSH3, MSH6, MUTYH, NBN, NF1, NTHL1, PALB2, PMS2, POLD1, POLE, PTEN, RAD51C, RAD51D, RECQL, SMAD4, SMARCA4, STK11 and TP53 genes. No mutations were found in any of the 36 genes analyzed. This test involved sequencing and deletion/duplication analysis of all genes with the exceptions of EPCAM and GREM1 (deletions/duplications only) and HOXB13, POLD1 and POLE (sequencing only).    Testing did not detect an  "identifiable mutation associated with Hereditary Breast and Ovarian Cancer syndrome (BRCA1, BRCA2), Miranda syndrome (MLH1, MSH2, MSH6, PMS2, EPCAM), Familial Adenomatous Polyposis (APC), Hereditary Diffuse Gastric Cancer (CDH1), Familial Atypical Multiple Mole Melanoma syndrome (CDK4, CDKN2A), Juvenile Polyposis syndrome (BMPR1A, SMAD4), Cowden syndrome (PTEN), Li Fraumeni syndrome (TP53), Peutz-Jeghers syndrome (STK11), MUTYH Associated Polyposis (MUTYH), or Neurofibromatosis type 1 (NF1).    A copy of the test report can be found in the Laboratory tab, dated 12/29/2020, and named \"SEND OUTS MISC TEST\". The report is scanned in as a linked document.    Interpretation:  We discussed several different interpretations of this negative test result.    1. One explanation may be that there is a different gene or combination of genes and environment that are associated with the cancers in this family that are not identifiable using this test. As such, Shauna is encouraged to contact me regularly to review any new genetic testing options that may be appropriate for her.  2. Another explanation may be that her relatives with cancer, such as her mother and/or father, do have a mutation in one of these 36 genes and Shauna did not inherit it.  3. There is also a small possibility that there is a mutation in one of these genes, and the testing laboratory could not find it with their current testing methods.       Screening:  Based on this negative test result, it is important for Shauna and her relatives to refer back to the family history for appropriate cancer screening.      Based on her personal and family history, Shauna has a 31.4% lifetime risk of developing breast cancer based on the KWESI 8 model. As such, Shauna meets current National Comprehensive Cancer Network (NCCN) guidelines for high risk breast screening. This includes annual breast MRI in addition to annual mammogram beginning at her current age. In " addition, Shauna should be receiving clinical breast exams by her physician. We discussed that Shauna could participate in our Cancer Risk Management Program in which our clinical nurse specialist provides an individual screening plan and assists with medical management. A referral was made to see THIAGO Rivas, for this service.    Due to Shauna s family history, significant for her father and paternal grandmother with pancreatic cancer, screening for pancreatic cancer may be considered per current NCCN guidelines. This screening typically involves endoscopic ultrasonography (EUS) and/or MRI/magnetic resonance cholangiopancreatography. Given her relatives' ages at diagnosis, this screening could be considered starting at age 50. Given the significant limitations of this screening, Shauna is encouraged to discuss this screening with LETTY Rivas.    Shauna s close relatives remain at increased risk for pancreatic and/or breast cancer given their family history, as well. Breast and pancreatic cancer screening options should be discussed with an individual's primary care provider and a genetic counselor, to determine at what age to begin screening, what screening is appropriate, and if additional screening (such as breast MRI, abdominal imaging) is necessary based on personal/family history factors.    Other population cancer screening options, such as those recommended by the American Cancer Society and NCCN, are also appropriate for Shauna and her family. These screening recommendations may change if there are changes to Shauna's personal and/or family history of cancer. Final screening recommendations should be made by each individual's managing physician.      Inheritance:  We reviewed the autosomal dominant inheritance of mutations in these 36 genes. We discussed that Shauna did not pass on an identifiable mutation in these genes to her daughter based on this test result. Mutations in  these genes do not skip generations.      Additional Testing Considerations:  Although Shauna's genetic testing result was negative, other relatives may still carry a gene mutation associated with hereditary cancer. Genetic counseling is recommended for her mother, maternal aunt with breast cancer (if not already pursued), brother, and paternal aunts/uncles to discuss their genetic testing options. If any of these relatives do pursue genetic testing, Shauna is encouraged to contact me so that we may review the impact of their test results on her.    Summary:  We do not have an explanation for Shauna's family history of cancer. While no genetic changes were identified, Shauna may still be at risk for certain cancers due to family history, environmental factors, or other genetic causes not identified by this test. Because of that, it is important that she continue with cancer screening based on her personal and family history as discussed above.    Genetic testing is rapidly advancing, and new cancer susceptibility genes will most likely be identified in the future. Therefore, I encouraged Shauna to contact me regularly or if there are changes in her personal or family history. This may change how we assess her cancer risk, screening, and the testing we would offer.    Plan:  1. Shauna will be mailed a copy of her test results.  2. She plans to follow-up with her medical providers, as needed.  3. A referral was placed for Shauna to meet with LETTY Rivas to discuss increased breast and pancreatic cancer screening.  4. She should contact me regularly or sooner if her family history changes.    If Shauna has any further questions, I encouraged her to contact me at [phone].    Ninfa Law MS, [facility]  Licensed Genetic Counselor  Office: [phone]  Pager: [phone]      Again, thank you for allowing me to participate in the care of your patient.        Sincerely,        Ninfa Law,  GC

## 2021-04-06 NOTE — LETTER
Cancer Risk Management  Program Locations    [facility]  [facility]  [facility]  [facility]  [facility]  Mailing Address  Cancer Risk Management Program  [facility]  [address]  [place] [address]    New patient appointments  [phone]  April 13, 2021    Shauna Becerra  [address]  [facility] [address]      Dear Shauna,    It was a pleasure speaking with you recently regarding your genetic testing results. Here is a copy of the progress note summarizing our conversation. If you have any additional questions, please feel free to call.    4/6/2021    Shauna is a 46 year old who is being evaluated via a billable video visit.      How would you like to obtain your AVS? MyChart  If the video visit is dropped, the invitation should be resent by: Text to cell phone: [phone]  Will anyone else be joining your video visit? No    Video-Visit Details  Type of service:  Video Visit  Video Start Time: 1:13pm  Video End Time:1:42pm  Originating Location (pt. Location): Home  Distant Location (provider location):  [facility]  Platform used for Video Visit: [employer]    Referring Provider: self-referred    Presenting Information:  I spoke to Shauna by video today to discuss her genetic testing results. We last met on 12/15/2020 and her saliva sample was collected on 12/29/2020. BRCA1/2 and Miranda Syndrome Analyses with the CancerNext panel was ordered from [facility]. This testing was done because of Shauna's family history of breast, pancreatic, and uterine cancer. Of note, Shauna requested today's date to review her test results due to a change in insurance coverage.    Genetic Testing Result: NEGATIVE  Shauna is negative for mutations in the APC, LYNN, AXIN2, BARD1, BMPR1A, BRCA1, BRCA2, BRIP1, CDH1, CDK4,  "CDKN2A, CHEK2, DICER1, EPCAM, GREM1, HOXB13, MLH1, MSH2, MSH3, MSH6, MUTYH, NBN, NF1, NTHL1, PALB2, PMS2, POLD1, POLE, PTEN, RAD51C, RAD51D, RECQL, SMAD4, SMARCA4, STK11 and TP53 genes. No mutations were found in any of the 36 genes analyzed. This test involved sequencing and deletion/duplication analysis of all genes with the exceptions of EPCAM and GREM1 (deletions/duplications only) and HOXB13, POLD1 and POLE (sequencing only).    Testing did not detect an identifiable mutation associated with Hereditary Breast and Ovarian Cancer syndrome (BRCA1, BRCA2), Miranda syndrome (MLH1, MSH2, MSH6, PMS2, EPCAM), Familial Adenomatous Polyposis (APC), Hereditary Diffuse Gastric Cancer (CDH1), Familial Atypical Multiple Mole Melanoma syndrome (CDK4, CDKN2A), Juvenile Polyposis syndrome (BMPR1A, SMAD4), Cowden syndrome (PTEN), Li Fraumeni syndrome (TP53), Peutz-Jeghers syndrome (STK11), MUTYH Associated Polyposis (MUTYH), or Neurofibromatosis type 1 (NF1).    A copy of the test report can be found in the Laboratory tab, dated 12/29/2020, and named \"[facility]\". The report is scanned in as a linked document.    Interpretation:  We discussed several different interpretations of this negative test result.    1. One explanation may be that there is a different gene or combination of genes and environment that are associated with the cancers in this family that are not identifiable using this test. As such, Shauna is encouraged to contact me regularly to review any new genetic testing options that may be appropriate for her.  2. Another explanation may be that her relatives with cancer, such as her mother and/or father, do have a mutation in one of these 36 genes and Shauna did not inherit it.  3. There is also a small possibility that there is a mutation in one of these genes, and the testing laboratory could not find it with their current testing methods.       Screening:  Based on this negative test result, it is " important for Shauna and her relatives to refer back to the family history for appropriate cancer screening.      Based on her personal and family history, Shauna has a 31.4% lifetime risk of developing breast cancer based on the KWESI 8 model. As such, Shauna meets current National Comprehensive Cancer Network (NCCN) guidelines for high risk breast screening. This includes annual breast MRI in addition to annual mammogram beginning at her current age. In addition, Shauna should be receiving clinical breast exams by her physician. We discussed that Shauna could participate in our Cancer Risk Management Program in which our clinical nurse specialist provides an individual screening plan and assists with medical management. A referral was made to see THIAGO Rivas, for this service.    Due to Shauna s family history, significant for her father and paternal grandmother with pancreatic cancer, screening for pancreatic cancer may be considered per current NCCN guidelines. This screening typically involves endoscopic ultrasonography (EUS) and/or MRI/magnetic resonance cholangiopancreatography. Given her relatives' ages at diagnosis, this screening could be considered starting at age 50. Given the significant limitations of this screening, Shauna is encouraged to discuss this screening with LETTY Rivas.    Shauna s close relatives remain at increased risk for pancreatic and/or breast cancer given their family history, as well. Breast and pancreatic cancer screening options should be discussed with an individual's primary care provider and a genetic counselor, to determine at what age to begin screening, what screening is appropriate, and if additional screening (such as breast MRI, abdominal imaging) is necessary based on personal/family history factors.    Other population cancer screening options, such as those recommended by the American Cancer Society and NCCN, are also appropriate for  Shauna and her family. These screening recommendations may change if there are changes to Shauna's personal and/or family history of cancer. Final screening recommendations should be made by each individual's managing physician.      Inheritance:  We reviewed the autosomal dominant inheritance of mutations in these 36 genes. We discussed that Shauna did not pass on an identifiable mutation in these genes to her daughter based on this test result. Mutations in these genes do not skip generations.      Additional Testing Considerations:  Although Shauna's genetic testing result was negative, other relatives may still carry a gene mutation associated with hereditary cancer. Genetic counseling is recommended for her mother, maternal aunt with breast cancer (if not already pursued), brother, and paternal aunts/uncles to discuss their genetic testing options. If any of these relatives do pursue genetic testing, Shauna is encouraged to contact me so that we may review the impact of their test results on her.    Summary:  We do not have an explanation for Shauna's family history of cancer. While no genetic changes were identified, Shauna may still be at risk for certain cancers due to family history, environmental factors, or other genetic causes not identified by this test. Because of that, it is important that she continue with cancer screening based on her personal and family history as discussed above.    Genetic testing is rapidly advancing, and new cancer susceptibility genes will most likely be identified in the future. Therefore, I encouraged Shauna to contact me regularly or if there are changes in her personal or family history. This may change how we assess her cancer risk, screening, and the testing we would offer.    Plan:  1. Shauna will be mailed a copy of her test results.  2. She plans to follow-up with her medical providers, as needed.  3. A referral was placed for Shauna to meet with Leonela  ELISA [facility] to discuss increased breast and pancreatic cancer screening.  4. She should contact me regularly or sooner if her family history changes.    If Shauna has any further questions, I encouraged her to contact me at [phone].    Ninfa Law MS, [facility]  Licensed Genetic Counselor  Office: [phone]  Pager: [phone]

## 2021-04-06 NOTE — Clinical Note
"Please print and send a copy of this letter and the patient's genetic testing report to the patient.    Please enclose test report: \"Send outs misc test Order: [id number]\"  "

## 2021-04-06 NOTE — PROGRESS NOTES
4/6/2021    Shauna is a 46 year old who is being evaluated via a billable video visit.      How would you like to obtain your AVS? [employer]  If the video visit is dropped, the invitation should be resent by: Text to cell phone: [phone]  Will anyone else be joining your video visit? No    Video-Visit Details  Type of service:  Video Visit  Video Start Time: 1:13pm  Video End Time:1:42pm  Originating Location (pt. Location): Home  Distant Location (provider location):  [facility]  Platform used for Video Visit: [employer]    Referring Provider: self-referred    Presenting Information:  I spoke to Shauna by video today to discuss her genetic testing results. We last met on 12/15/2020 and her saliva sample was collected on 12/29/2020. BRCA1/2 and Miranda Syndrome Analyses with the CancerNext panel was ordered from [facility]. This testing was done because of Shauna's family history of breast, pancreatic, and uterine cancer. Of note, Shauna requested today's date to review her test results due to a change in insurance coverage.    Genetic Testing Result: NEGATIVE  Shauna is negative for mutations in the APC, LYNN, AXIN2, BARD1, BMPR1A, BRCA1, BRCA2, BRIP1, CDH1, CDK4, CDKN2A, CHEK2, DICER1, EPCAM, GREM1, HOXB13, MLH1, MSH2, MSH3, MSH6, MUTYH, NBN, NF1, NTHL1, PALB2, PMS2, POLD1, POLE, PTEN, RAD51C, RAD51D, RECQL, SMAD4, SMARCA4, STK11 and TP53 genes. No mutations were found in any of the 36 genes analyzed. This test involved sequencing and deletion/duplication analysis of all genes with the exceptions of EPCAM and GREM1 (deletions/duplications only) and HOXB13, POLD1 and POLE (sequencing only).    Testing did not detect an identifiable mutation associated with Hereditary Breast and Ovarian Cancer syndrome (BRCA1, BRCA2), Miranda syndrome (MLH1, MSH2, MSH6, PMS2, EPCAM), Familial Adenomatous Polyposis (APC), Hereditary Diffuse Gastric Cancer (CDH1), Familial Atypical Multiple Mole Melanoma  "syndrome (CDK4, CDKN2A), Juvenile Polyposis syndrome (BMPR1A, SMAD4), Cowden syndrome (PTEN), Li Fraumeni syndrome (TP53), Peutz-Jeghers syndrome (STK11), MUTYH Associated Polyposis (MUTYH), or Neurofibromatosis type 1 (NF1).    A copy of the test report can be found in the Laboratory tab, dated 12/29/2020, and named \"SEND OUTS [facility] TEST\". The report is scanned in as a linked document.    Interpretation:  We discussed several different interpretations of this negative test result.    1. One explanation may be that there is a different gene or combination of genes and environment that are associated with the cancers in this family that are not identifiable using this test. As such, Shauna is encouraged to contact me regularly to review any new genetic testing options that may be appropriate for her.  2. Another explanation may be that her relatives with cancer, such as her mother and/or father, do have a mutation in one of these 36 genes and Shauna did not inherit it.  3. There is also a small possibility that there is a mutation in one of these genes, and the testing laboratory could not find it with their current testing methods.       Screening:  Based on this negative test result, it is important for Shauna and her relatives to refer back to the family history for appropriate cancer screening.      Based on her personal and family history, Shauna has a 31.4% lifetime risk of developing breast cancer based on the KWESI 8 model. As such, Shauna meets current National Comprehensive Cancer Network (NCCN) guidelines for high risk breast screening. This includes annual breast MRI in addition to annual mammogram beginning at her current age. In addition, Shauna should be receiving clinical breast exams by her physician. We discussed that Shauna could participate in our Cancer Risk Management Program in which our clinical nurse specialist provides an individual screening plan and assists with medical management. A " referral was made to see THIAGO Rivas, for this service.    Due to Shauna s family history, significant for her father and paternal grandmother with pancreatic cancer, screening for pancreatic cancer may be considered per current NCCN guidelines. This screening typically involves endoscopic ultrasonography (EUS) and/or MRI/magnetic resonance cholangiopancreatography. Given her relatives' ages at diagnosis, this screening could be considered starting at age 50. Given the significant limitations of this screening, Shauna is encouraged to discuss this screening with LETTY Rivas.    Shauna s close relatives remain at increased risk for pancreatic and/or breast cancer given their family history, as well. Breast and pancreatic cancer screening options should be discussed with an individual's primary care provider and a genetic counselor, to determine at what age to begin screening, what screening is appropriate, and if additional screening (such as breast MRI, abdominal imaging) is necessary based on personal/family history factors.    Other population cancer screening options, such as those recommended by the American Cancer Society and NCCN, are also appropriate for Shauna and her family. These screening recommendations may change if there are changes to Shauna's personal and/or family history of cancer. Final screening recommendations should be made by each individual's managing physician.      Inheritance:  We reviewed the autosomal dominant inheritance of mutations in these 36 genes. We discussed that Shauna did not pass on an identifiable mutation in these genes to her daughter based on this test result. Mutations in these genes do not skip generations.      Additional Testing Considerations:  Although Shauna's genetic testing result was negative, other relatives may still carry a gene mutation associated with hereditary cancer. Genetic counseling is recommended for her mother, maternal  aunt with breast cancer (if not already pursued), brother, and paternal aunts/uncles to discuss their genetic testing options. If any of these relatives do pursue genetic testing, Shauna is encouraged to contact me so that we may review the impact of their test results on her.    Summary:  We do not have an explanation for Shauna's family history of cancer. While no genetic changes were identified, Shauna may still be at risk for certain cancers due to family history, environmental factors, or other genetic causes not identified by this test. Because of that, it is important that she continue with cancer screening based on her personal and family history as discussed above.    Genetic testing is rapidly advancing, and new cancer susceptibility genes will most likely be identified in the future. Therefore, I encouraged Shauna to contact me regularly or if there are changes in her personal or family history. This may change how we assess her cancer risk, screening, and the testing we would offer.    Plan:  1. Shauna will be mailed a copy of her test results.  2. She plans to follow-up with her medical providers, as needed.  3. A referral was placed for Shauna to meet with LETTY Rivas to discuss increased breast and pancreatic cancer screening.  4. She should contact me regularly or sooner if her family history changes.    If Shauna has any further questions, I encouraged her to contact me at [phone].    Ninfa Law MS, [facility]  Licensed Genetic Counselor  Office: [phone]  Pager: [phone]
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
## 2021-04-14 ENCOUNTER — OFFICE VISIT (OUTPATIENT)
Dept: FAMILY MEDICINE | Facility: CLINIC | Age: 46
End: 2021-04-14
Payer: COMMERCIAL

## 2021-04-14 VITALS
HEIGHT: 66 IN | DIASTOLIC BLOOD PRESSURE: 74 MMHG | TEMPERATURE: 98.7 F | SYSTOLIC BLOOD PRESSURE: 110 MMHG | RESPIRATION RATE: 16 BRPM | BODY MASS INDEX: 29.57 KG/M2 | OXYGEN SATURATION: 98 % | WEIGHT: 184 LBS | HEART RATE: 62 BPM

## 2021-04-14 DIAGNOSIS — J45.30 MILD PERSISTENT EXTRINSIC ASTHMA WITHOUT COMPLICATION: ICD-10-CM

## 2021-04-14 DIAGNOSIS — Z13.6 CARDIOVASCULAR SCREENING; LDL GOAL LESS THAN 100: ICD-10-CM

## 2021-04-14 DIAGNOSIS — G43.909 MIGRAINE WITHOUT STATUS MIGRAINOSUS, NOT INTRACTABLE, UNSPECIFIED MIGRAINE TYPE: ICD-10-CM

## 2021-04-14 DIAGNOSIS — Z80.0 FAMILY HISTORY OF PANCREATIC CANCER: ICD-10-CM

## 2021-04-14 DIAGNOSIS — D68.51 FACTOR V LEIDEN (H): ICD-10-CM

## 2021-04-14 DIAGNOSIS — B00.9 HSV (HERPES SIMPLEX VIRUS) INFECTION: ICD-10-CM

## 2021-04-14 DIAGNOSIS — R05.9 COUGH: ICD-10-CM

## 2021-04-14 DIAGNOSIS — Z51.81 ENCOUNTER FOR THERAPEUTIC DRUG MONITORING: ICD-10-CM

## 2021-04-14 DIAGNOSIS — N92.1 METRORRHAGIA: ICD-10-CM

## 2021-04-14 DIAGNOSIS — Z11.59 ENCOUNTER FOR HEPATITIS C SCREENING TEST FOR LOW RISK PATIENT: ICD-10-CM

## 2021-04-14 DIAGNOSIS — Z00.00 ROUTINE GENERAL MEDICAL EXAMINATION AT A HEALTH CARE FACILITY: Primary | ICD-10-CM

## 2021-04-14 DIAGNOSIS — K21.9 GASTROESOPHAGEAL REFLUX DISEASE, UNSPECIFIED WHETHER ESOPHAGITIS PRESENT: ICD-10-CM

## 2021-04-14 DIAGNOSIS — Z13.1 SCREENING FOR DIABETES MELLITUS: ICD-10-CM

## 2021-04-14 DIAGNOSIS — Z80.3 FAMILY HISTORY OF MALIGNANT NEOPLASM OF BREAST: ICD-10-CM

## 2021-04-14 DIAGNOSIS — R06.02 SHORTNESS OF BREATH: ICD-10-CM

## 2021-04-14 PROCEDURE — 99396 PREV VISIT EST AGE 40-64: CPT | Performed by: NURSE PRACTITIONER

## 2021-04-14 RX ORDER — DEXAMETHASONE 4 MG/1
TABLET ORAL
Qty: 12 G | Refills: 1 | Status: SHIPPED | OUTPATIENT
Start: 2021-04-14 | End: 2022-05-11

## 2021-04-14 RX ORDER — VALACYCLOVIR HYDROCHLORIDE 500 MG/1
500 TABLET, FILM COATED ORAL DAILY
Qty: 90 TABLET | Refills: 3 | Status: SHIPPED | OUTPATIENT
Start: 2021-04-14 | End: 2022-05-11

## 2021-04-14 RX ORDER — VALACYCLOVIR HYDROCHLORIDE 1 G/1
2000 TABLET, FILM COATED ORAL 2 TIMES DAILY
Qty: 4 TABLET | Refills: 3 | Status: SHIPPED | OUTPATIENT
Start: 2021-04-14 | End: 2022-05-11

## 2021-04-14 RX ORDER — ALBUTEROL SULFATE 90 UG/1
AEROSOL, METERED RESPIRATORY (INHALATION)
Qty: 18 G | Refills: 1 | Status: SHIPPED | OUTPATIENT
Start: 2021-04-14 | End: 2022-05-11

## 2021-04-14 ASSESSMENT — ANXIETY QUESTIONNAIRES
4. TROUBLE RELAXING: SEVERAL DAYS
GAD7 TOTAL SCORE: 2
2. NOT BEING ABLE TO STOP OR CONTROL WORRYING: NOT AT ALL
6. BECOMING EASILY ANNOYED OR IRRITABLE: SEVERAL DAYS
7. FEELING AFRAID AS IF SOMETHING AWFUL MIGHT HAPPEN: NOT AT ALL
1. FEELING NERVOUS, ANXIOUS, OR ON EDGE: NOT AT ALL
3. WORRYING TOO MUCH ABOUT DIFFERENT THINGS: NOT AT ALL
5. BEING SO RESTLESS THAT IT IS HARD TO SIT STILL: NOT AT ALL
GAD7 TOTAL SCORE: 2
7. FEELING AFRAID AS IF SOMETHING AWFUL MIGHT HAPPEN: NOT AT ALL
GAD7 TOTAL SCORE: 2

## 2021-04-14 ASSESSMENT — ENCOUNTER SYMPTOMS
MYALGIAS: 0
NAUSEA: 0
SORE THROAT: 0
HEMATURIA: 0
DIZZINESS: 0
SHORTNESS OF BREATH: 0
DYSURIA: 0
HEMATOCHEZIA: 0
NERVOUS/ANXIOUS: 0
ABDOMINAL PAIN: 0
COUGH: 0
ARTHRALGIAS: 0
FREQUENCY: 0
WEAKNESS: 0
FEVER: 0
EYE PAIN: 0
HEADACHES: 0
DIARRHEA: 0
BREAST MASS: 0
CONSTIPATION: 0
HEARTBURN: 0
PARESTHESIAS: 0
JOINT SWELLING: 0
CHILLS: 0
PALPITATIONS: 0

## 2021-04-14 ASSESSMENT — PATIENT HEALTH QUESTIONNAIRE - PHQ9
SUM OF ALL RESPONSES TO PHQ QUESTIONS 1-9: 1
SUM OF ALL RESPONSES TO PHQ QUESTIONS 1-9: 1
10. IF YOU CHECKED OFF ANY PROBLEMS, HOW DIFFICULT HAVE THESE PROBLEMS MADE IT FOR YOU TO DO YOUR WORK, TAKE CARE OF THINGS AT HOME, OR GET ALONG WITH OTHER PEOPLE: NOT DIFFICULT AT ALL

## 2021-04-14 ASSESSMENT — MIFFLIN-ST. JEOR: SCORE: 1501.37

## 2021-04-14 NOTE — PATIENT INSTRUCTIONS
Negative Genetic Test Result    Genetic Testing  You had a blood test that looked at the genetic information in one or more genes associated with increased cancer risk.  The testing looked for any harmful changes that would stop this particular gene from working like it should. If an individual does not have any harmful changes or variants of unknown significance found from their blood test, their genetic test result is reported as negative.       Results  The genetic test did not identify any pathogenic (harmful) changes in the genes that were tested. There are several possible explanations for a negative test result. Without knowing the gene mutation in your family, the cause of the cancer in you or your relatives is still unknown. Your genetic counselor can help interpret the result for you and your relatives. In this case, there are several reasons that may explain the negative test result:    There may be a gene mutation in the family that you did not inherit.     You may have a gene mutation in a different gene that was not included in the test, or has not yet been discovered.     The cancers in you or your family may be due to a combination of genetic factors and environment (multifactorial/familial).    The cancers in you or your family may be sporadic/random cancers.    There is very small chance that a mutation was not found by current testing methods.  As testing technology evolves over time, it may still be possible to identify a mutation in a gene that was not found on this test.    It is important to note which genes were included in your test. A list of these genes can be found on your test result.    Screening Recommendations  Due to this negative test result, cancer screening recommendations should be based on your personal and family history. This may include increased cancer screening for you and/or your family members. Your genetic counselor and health care provider can help make  appropriate recommendations.      Please call us if you have any questions or concerns.   Cancer Risk Management Program [phone]-CANCER ([phone])  ? Nicko Wharton, MS, [facility] [phone]  ? Darya oHyos, MS, [facility]  [phone]  ? Leeann Zimmer, MS, [facility]  [phone]  ? Ninfa Law, MS, [facility] [phone]  ? Barb Nguyen, MS, [facility] [phone]  ? Pam Ny, MS, [facility]  [phone]

## 2021-04-14 NOTE — PATIENT INSTRUCTIONS
1.  Update labs, will schedule lab only visit    2.  Follow up if you want to discuss other treatment options for migraines    3.  Continue with healthy diet changes and exercise    4.  Think about taking zyrtec consistently during allergy season.  We could think about singulair for allergy induces asthma.  Refilled flovent and albuterol    5.  Refilled valacyclovir both doses      Preventive Health Recommendations  Female Ages 40 to 49    Yearly exam:     See your health care provider every year in order to  1. Review health changes.   2. Discuss preventive care.    3. Review your medicines if your doctor prescribed any.      Get a Pap test every three years (unless you have an abnormal result and your provider advises testing more often).      If you get Pap tests with HPV test, you only need to test every 5 years, unless you have an abnormal result. You do not need a Pap test if your uterus was removed (hysterectomy) and you have not had cancer.      You should be tested each year for STDs (sexually transmitted diseases), if you're at risk.     Ask your doctor if you should have a mammogram.      Have a colonoscopy (test for colon cancer) if someone in your family has had colon cancer or polyps before age 50.       Have a cholesterol test every 5 years.       Have a diabetes test (fasting glucose) after age 45. If you are at risk for diabetes, you should have this test every 3 years.    Shots: Get a flu shot each year. Get a tetanus shot every 10 years.     Nutrition:     Eat at least 5 servings of fruits and vegetables each day.    Eat whole-grain bread, whole-wheat pasta and brown rice instead of white grains and rice.    Get adequate Calcium and Vitamin D.      Lifestyle    Exercise at least 150 minutes a week (an average of 30 minutes a day, 5 days a week). This will help you control your weight and prevent disease.    Limit alcohol to one drink per day.    No smoking.     Wear sunscreen to prevent skin  cancer.    See your dentist every six months for an exam and cleaning.

## 2021-04-15 ASSESSMENT — ANXIETY QUESTIONNAIRES: GAD7 TOTAL SCORE: 2

## 2021-04-15 ASSESSMENT — ASTHMA QUESTIONNAIRES: ACT_TOTALSCORE: 25

## 2021-04-15 ASSESSMENT — PATIENT HEALTH QUESTIONNAIRE - PHQ9: SUM OF ALL RESPONSES TO PHQ QUESTIONS 1-9: 1

## 2021-04-21 DIAGNOSIS — Z13.1 SCREENING FOR DIABETES MELLITUS: ICD-10-CM

## 2021-04-21 DIAGNOSIS — Z12.31 VISIT FOR SCREENING MAMMOGRAM: ICD-10-CM

## 2021-04-21 DIAGNOSIS — Z11.59 ENCOUNTER FOR HEPATITIS C SCREENING TEST FOR LOW RISK PATIENT: ICD-10-CM

## 2021-04-21 DIAGNOSIS — Z13.6 CARDIOVASCULAR SCREENING; LDL GOAL LESS THAN 100: ICD-10-CM

## 2021-04-21 DIAGNOSIS — Z51.81 ENCOUNTER FOR THERAPEUTIC DRUG MONITORING: ICD-10-CM

## 2021-04-21 LAB
CHOLEST SERPL-MCNC: 224 MG/DL
CREAT SERPL-MCNC: 0.7 MG/DL (ref 0.52–1.04)
GFR SERPL CREATININE-BSD FRML MDRD: >90 ML/MIN/{1.73_M2}
GLUCOSE SERPL-MCNC: 90 MG/DL (ref 70–99)
HCV AB SERPL QL IA: NONREACTIVE
HDLC SERPL-MCNC: 67 MG/DL
LDLC SERPL CALC-MCNC: 140 MG/DL
NONHDLC SERPL-MCNC: 157 MG/DL
TRIGL SERPL-MCNC: 84 MG/DL

## 2021-04-21 PROCEDURE — 82947 ASSAY GLUCOSE BLOOD QUANT: CPT | Performed by: NURSE PRACTITIONER

## 2021-04-21 PROCEDURE — 77063 BREAST TOMOSYNTHESIS BI: CPT | Mod: GC

## 2021-04-21 PROCEDURE — 82565 ASSAY OF CREATININE: CPT | Performed by: NURSE PRACTITIONER

## 2021-04-21 PROCEDURE — 77067 SCR MAMMO BI INCL CAD: CPT | Mod: GC

## 2021-04-21 PROCEDURE — 36415 COLL VENOUS BLD VENIPUNCTURE: CPT | Performed by: NURSE PRACTITIONER

## 2021-04-21 PROCEDURE — 80061 LIPID PANEL: CPT | Performed by: NURSE PRACTITIONER

## 2021-04-21 PROCEDURE — 86803 HEPATITIS C AB TEST: CPT | Performed by: NURSE PRACTITIONER

## 2021-04-22 NOTE — RESULT ENCOUNTER NOTE
Shauna,    Your blood sugar was normal indicating you do not have diabetes.    Normal kidney function.    Cholesterol is stable.    Hepatitis C is negative.    Evelin Tellez, CNP

## 2021-05-25 NOTE — TELEPHONE ENCOUNTER
RECORDS STATUS - BREAST    RECORDS REQUESTED FROM: EPIC   DATE REQUESTED: 8/20/2021   NOTES DETAILS STATUS   OFFICE NOTE from referring provider Complete Ninfa Alexandra [facility]   OFFICE NOTE from medical oncologist Complete 4/6/2021 Virtual Visit- -Family Hx of Malignant Neoplasm of breast (Primary Dx)     12/15/2020 Virtual Visit- -Family Hx of Malignant Neoplasm of breast (Primary Dx)     10/16/2018 Office Visit-   Family Hx of pancreatic cancer (Primary Dx)    OFFICE NOTE from surgeon N/A    OFFICE NOTE from radiation oncologist     OPERATIVE REPORT N/A    LABS     PATHOLOGY REPORTS  (Tissue diagnosis, Stage, ER/OK percentage positive and intensity of staining, HER2 IHC, FISH, and all biopsies from breast and any distant metastasis)                 N/A    GENONOMIC TESTING     TYPE:   (Next Generation Sequencing, including Foundation One testing, and Oncotype score) Complete 4/7/2005 Factor 5    IMAGING (NEED IMAGES & REPORT)     CT SCANS     MRI     MAMMO Complete [place] Screening 4/21/2021    [facility] Diagn[facility] 4/17/2020    [place] Screening 10/17/2019     More in PACS   ULTRASOUND Complete US Breast Left 4/17/2020   PET     BONE SCAN     BRAIN MRI

## 2021-06-22 DIAGNOSIS — B00.9 HSV (HERPES SIMPLEX VIRUS) INFECTION: ICD-10-CM

## 2021-06-25 RX ORDER — VALACYCLOVIR HYDROCHLORIDE 1 G/1
2000 TABLET, FILM COATED ORAL 2 TIMES DAILY
Qty: 4 TABLET | Refills: 3 | OUTPATIENT
Start: 2021-06-25

## 2021-06-28 RX ORDER — VALACYCLOVIR HYDROCHLORIDE 500 MG/1
TABLET, FILM COATED ORAL
Qty: 90 TABLET | Refills: 3 | OUTPATIENT
Start: 2021-06-28

## 2021-06-28 NOTE — TELEPHONE ENCOUNTER
[facility] called   On 4/14/21 PCP refilled this for one full year  Left  This message left on pharmacy's VM and rx denied to pharmacy with note they should have refills on hand    An Foster RN, BSN  [facility]

## 2021-07-06 DIAGNOSIS — B00.9 HSV (HERPES SIMPLEX VIRUS) INFECTION: ICD-10-CM

## 2021-07-06 RX ORDER — VALACYCLOVIR HYDROCHLORIDE 1 G/1
2000 TABLET, FILM COATED ORAL 2 TIMES DAILY
Qty: 4 TABLET | Refills: 3 | Status: CANCELLED | OUTPATIENT
Start: 2021-07-06

## 2021-07-07 NOTE — TELEPHONE ENCOUNTER
Shauna Becerra at 5:22 PM on 7/1/2021.  Shauna Becerra  to Evelin Tellez, THIAGO CNP          7/1/21 11:02 AM  Never mind my last message! Apparently they do have Rx and it s filled now. Not sure where they got confused.

## 2021-08-09 ENCOUNTER — E-VISIT (OUTPATIENT)
Dept: URGENT CARE | Facility: URGENT CARE | Age: 46
End: 2021-08-09
Payer: COMMERCIAL

## 2021-08-09 DIAGNOSIS — L98.9 SKIN LESION: Primary | ICD-10-CM

## 2021-08-09 PROCEDURE — 99421 OL DIG E/M SVC 5-10 MIN: CPT | Performed by: PHYSICIAN ASSISTANT

## 2021-08-09 NOTE — PATIENT INSTRUCTIONS
Dear Shauna Becerra,    We are sorry you are not feeling well. Based on the responses you provided, it is recommended that you be seen in-person in urgent care so we can better evaluate your symptoms

## 2021-08-20 ENCOUNTER — PRE VISIT (OUTPATIENT)
Dept: ONCOLOGY | Facility: CLINIC | Age: 46
End: 2021-08-20

## 2021-08-24 ENCOUNTER — ONCOLOGY VISIT (OUTPATIENT)
Dept: ONCOLOGY | Facility: CLINIC | Age: 46
End: 2021-08-24
Attending: GENETIC COUNSELOR, MS
Payer: COMMERCIAL

## 2021-08-24 VITALS
RESPIRATION RATE: 16 BRPM | WEIGHT: 182.5 LBS | SYSTOLIC BLOOD PRESSURE: 106 MMHG | HEIGHT: 66 IN | BODY MASS INDEX: 29.33 KG/M2 | DIASTOLIC BLOOD PRESSURE: 71 MMHG | TEMPERATURE: 98 F | OXYGEN SATURATION: 98 % | HEART RATE: 65 BPM

## 2021-08-24 DIAGNOSIS — Z80.0 FAMILY HISTORY OF PANCREATIC CANCER: ICD-10-CM

## 2021-08-24 DIAGNOSIS — R92.30 DENSE BREAST: ICD-10-CM

## 2021-08-24 DIAGNOSIS — Z12.39 BREAST CANCER SCREENING, HIGH RISK PATIENT: Primary | ICD-10-CM

## 2021-08-24 PROCEDURE — 99417 PROLNG OP E/M EACH 15 MIN: CPT | Performed by: CLINICAL NURSE SPECIALIST

## 2021-08-24 PROCEDURE — G0463 HOSPITAL OUTPT CLINIC VISIT: HCPCS

## 2021-08-24 PROCEDURE — 99215 OFFICE O/P EST HI 40 MIN: CPT | Performed by: CLINICAL NURSE SPECIALIST

## 2021-08-24 ASSESSMENT — MIFFLIN-ST. JEOR: SCORE: 1494.56

## 2021-08-24 ASSESSMENT — PAIN SCALES - GENERAL: PAINLEVEL: NO PAIN (0)

## 2021-08-24 NOTE — LETTER
2021         RE: Shauna Becerra  [address]  [facility] [address]        Dear Colleague,    Thank you for referring your patient, Shauna Becerra, to the [facility]. Please see a copy of my visit note below.    Oncology Risk Management Consultation:  Date on this visit: 2021    Shauna Becerra  is referred by Ninfa Law, Licensed Genetic Counselor,  for an oncology risk management consultation. She requires high risk screening and surveillance to reduce her risk of cancer secondary to having a family history of breast cancer in her mother, maternal aunt, and maternal grandmother. She is considered to at high risk for breast cancer and has a 31.4% lifetime risk for breast cancer by the KWESI model.  She has a 1.4% risk for breast cancer within the next 5 years by the Roxanne model.    Primary Physician: THIAGO Yip-CNP    History Of Present Illness:  Ms. Becerra is a very pleasant, healthy 46 year old female who presents with a family history of breast cancer.    Pertinent history:  Menarche  at age 15  First child at age 41  Perimenopausal.   LMP:  - some short cycles, some longer. Heavier than previously. Some erratic cycles, more than 30 days.  Hx of breastfeeding: abut 3 months  History of Factor V Leiden  Breast density: heterogeneously dense  Ovaries, fallopian tubes and uterus in place  No hx of hormone replacement therapy.    No history of atypia and malignancy.    Genetic testin2020 -- negative for mutations in the APC, LYNN, AXIN2, BARD1, BMPR1A, BRCA1, BRCA2, BRIP1, CDH1, CDK4, CDKN2A, CHEK2, DICER1, EPCAM, GREM1, HOXB13, MLH1, MSH2, MSH3, MSH6, MUTYH, NBN, NF1, NTHL1, PALB2, PMS2, POLD1, POLE, PTEN, RAD51C, RAD51D, RECQL, SMAD4, SMARCA4, STK11 and TP53 genes. No mutations were found in any of the 36 genes analyzed.using the BRCA1/2 and Miranda Syndrome Analyses with the Cancer Next panel from [employer]. This testing was done because of Shauna's family history of breast, pancreatic, and uterine cancer. Of note, Shauna requested today's date to review her test results due to a change in insurance coverage.    Pertinent screening history:  7/26/2013- Bilateral diagnostic mammogram and left breast US for palpable lump Palpable lump left breast. Patient noticed lump 3 weeks   prior and feels it has decreased in size since that time. BiRads1  10/18/2017- Screening mammogram, BiRads1  10/18/2018- Screening mammogram, BiRads1  10/17/2019- Screening mammogram, BiRads1  4/17/2020- Diagnostic left breast mammogram and US, for Palpable inferior left breast area of concern, both BiRads1  4/21/2021- Screening tomosynthesis mammogram, BiRads1  No history of breast MRI    At this visit, she does not practice self exams routinely but denies new fatigue, breast pain, asymmetry, lumps, masses, thickening, nipple discharge and skin changes in her breasts.    Past Medical/Surgical History:  Past Medical History:   Diagnosis Date     Acne      Asthma      Embolus (H) 28 yo    of lung from right leg-was on coumadin for a long while     Factor V Leiden (H)     cannot have birth control     GERD (gastroesophageal reflux disease)      Menarche age 12+    cycles q  24-27 x 4-5d      Past Surgical History:   Procedure Laterality Date     HC TOOTH EXTRACTION W/FORCEP         Allergies:  Allergies as of 08/24/2021 - Reviewed 08/24/2021   Allergen Reaction Noted     Nkda [no known drug allergies]  08/30/2013       Current Medications:  Current Outpatient Medications   Medication Sig Dispense Refill     albuterol (PROAIR HFA/PROVENTIL HFA/VENTOLIN HFA) 108 (90 Base) MCG/ACT inhaler INHALE 2 PUFFS BY MOUTH EVERY 6 HOURS AS NEEDED FOR WHEEZE OR FOR SHORTNESS OF BREATH 18 g 1     fluticasone (FLOVENT HFA) 110 MCG/ACT inhaler TAKE 1 PUFF BY MOUTH TWICE A DAY (Patient taking differently: daily as needed TAKE 1 PUFF BY MOUTH TWICE A DAY) 12  g 1     valACYclovir (VALTREX) 500 MG tablet Take 1 tablet (500 mg) by mouth daily 90 tablet 3     valACYclovir (VALTREX) 1000 mg tablet Take 2 tablets (2,000 mg) by mouth 2 times daily (Patient not taking: Reported on 8/24/2021) 4 tablet 3        Family History:  Family History   Problem Relation Age of Onset     Thrombophilia Mother      Breast Cancer Mother 67        radiation and lumpectomy     Migraines Mother      Genitourinary Problems Mother      GERD Mother      Pancreatic Cancer Father 63        hx of smoking     Breast Cancer Maternal Grandmother 70     Lung Cancer Maternal Grandfather 89     Skin Cancer Maternal Grandfather      Breast Cancer Paternal Grandmother         after age 50     Uterine Cancer Paternal Grandmother         after age 50     Pancreatic Cancer Paternal Grandmother 75     Chronic Obstructive Pulmonary Disease Paternal Grandfather      Breast Cancer Maternal Aunt 55     Pancreatic Cancer Paternal Aunt 55        paternal grandfather's sister     Deep Vein Thrombosis Other        Social History:  Social History     Socioeconomic History     Marital status: Single     Spouse name: Not on file     Number of children: 1     Years of education: Not on file     Highest education level: Not on file   Occupational History     Occupation: PHd     Employer: [employer]     Comment: biological science, research   Tobacco Use     Smoking status: Never Smoker     Smokeless tobacco: Never Used   Substance and Sexual Activity     Alcohol use: Yes     Alcohol/week: 0.0 standard drinks     Drug use: No     Sexual activity: Yes     Partners: Male     Birth control/protection: None   Other Topics Concern     Parent/sibling w/ CABG, MI or angioplasty before 65F 55M? No   Social History Narrative    Caffeine intake/servings daily - 0    Calcium intake/servings daily - 3    Exercise 5 times weekly - describe ; cardio, weights, bikes    Sunscreen used - Yes    Seatbelts used - Yes    Guns stored in the home -  "No    Self Breast Exam - Yes    Pap test up to date -  Due for pap    Eye exam up to date -  Yes    Dental exam up to date -  Yes    DEXA scan up to date -  No    Flex Sig/Colonoscopy up to date -  No    Mammography up to date -  Yes , family history of breast cancer    Immunizations reviewed and up to date - Yes    Abuse: Current or Past (Physical, Sexual or Emotional) - No    Do you feel safe in your environment - Yes    Do you cope well with stress - Yes    Do you suffer from insomnia - No    Last updated by: Kari Lopez  2/23/2016             Social Determinants of Health     Financial Resource Strain:      Difficulty of Paying Living Expenses:    Food Insecurity:      Worried About Running Out of Food in the Last Year:      Ran Out of Food in the Last Year:    Transportation Needs:      Lack of Transportation (Medical):      Lack of Transportation (Non-Medical):    Physical Activity:      Days of Exercise per Week:      Minutes of Exercise per Session:    Stress:      Feeling of Stress :    Social Connections:      Frequency of Communication with Friends and Family:      Frequency of Social Gatherings with Friends and Family:      Attends Anglican Services:      Active Member of Clubs or Organizations:      Attends Club or Organization Meetings:      Marital Status:    Intimate Partner Violence: Not At Risk     Fear of Current or Ex-Partner: No     Emotionally Abused: No     Physically Abused: No     Sexually Abused: No       Physical Exam:  /71   Pulse 65   Temp 98  F (36.7  C)   Resp 16   Ht 1.676 m (5' 6\")   Wt 82.8 kg (182 lb 8 oz)   LMP 08/17/2021   SpO2 98%   BMI 29.46 kg/m      GENERAL APPEARANCE: healthy, alert and no distress     NECK: no adenopathy, no asymmetry or masses     LYMPHATICS: No cervical, supraclavicular or axillary lymphadenopathy     RESP: lungs clear to auscultation - no rales, rhonchi or wheezes     BREAST: A multipositional, bilateral breast exam was " performed.  Fairly symmetrical breasts. Right breast: no palpable dominant masses, no nipple discharge, no skin changes. Dense tissue.  Right axilla: no palpable adenopathy. Left breast: no palpable dominant masses, no nipple discharge, no skin changes. Left axilla: no palpable adenopathy. Dense tissue with fibrocystic tissue throughout.  CARDIOVASCULAR: regular rate and rhythm, normal S1 S2, no S3 or S4 and no murmur.   SKIN: diffuse raised red lesions along bra line, some  Large with white heads.     Laboratory/Imaging Studies  No results found for any visits on 08/24/21.    ASSESSMENT  We reviewed her family history of pancreatic cancer and her issues with low enzyme production, which she has seen GABRIEL Harman, for.  Her mother has a history a pancreatic neoplasm, possibly an IPMN, as well. I am referring her for a consultation about pancreatic screening to Dr. Qamar Bridges.    We also discussed her history of cystic acne in her breast area; she has a dermatologist that she has seen before and may return for care with.  She is currently moving to [place], [place] on Thursday, where her partner will be doing a residency in orthopedic prosthesis development and she declines a referral to the [facility] today. She will consider whether she will have a breast MRI annually as she does have concerns about the gadolinium given annually. She will have the breast MRI in October, as a baseline.    We discussed that the Breast MRI in high-risk women has a higher sensitivity than mammography, and the combination of mammography and MRI in this population has the highest sensitivity. In a high-risk population, MRI and mammography combined have a higher sensitivity (92.7%) than US and mammography combined (52%) (Source: Chance ELLIS, Gibson COLLINS, Julio CASTAÑEDA, et al. Detection of breast cancer with addition of annual screening ultrasound or a single screening MRI to mammography in women with elevated breast cancer  risk. WES.2012;307(13):6309-2618. )    Therefore, in high-risk women for whom supplemental screening is indicated, MRI is recommended when possible. Screening high-risk women with breast MRI is cost-effective, and the cost-effectiveness of screening MRI increases with increasing breast cancer risk ( Donna et. al 2006 and Randal et al. 2009).     The National Comprehensive Cancer Society, American Cancer Society and American College of Radiologists recommend breast-screening MRI in high-risk women.    In her case, I have ordered a breast MRI (IMG 1045) using the diagnostic codes:  Breast Screening, high risk patient (Z12.39)  Dense Breasts (R92.2)  Family history of breast cancer (Z80.3)    We discussed signs and symptoms to be watchful for in between visits. She verbalized understanding of signs and symptoms of breast issues to address with her health care providers in between visits, including lumps, thickening, swelling, tenderness, nipple discharge or changes in skin of breasts.    We also discussed following  a healthy lifestyle plan recommended by both NCCN and the American Cancer Society that can reduce the risk of breast cancer:  1 Limit alcohol consumption to less than 1 drink per day (1 drink=5 oz.wine, 12 oz. Beer or 1.5 oz. 80-proof liquor).  2. Exercise per American Cancer Society guidelines of at least 150 minutes of moderate-intensity activity or 75 minutes of vigorous activity each week. (Or a combination of both.) Exercise should be spread  out over the week. Regular recreational exercise has been shown to reduce the risk of cancer by 30%.   3. Maintain a healthy weight with a Body Mass Index between 19-24.9. A postmenopausal BMI of 30.7 has been shown to have increased the risk for breast cancer by 37% in some studies.  4. Do not use tobacco products and limit exposure to passive smoke.  5. Breastfeed if possible. Research shows that 16+ months of breastfeeding, over a lifetime, can reduce a  woman's risk of breast cancer by up to 27%.      Individualized Surveillance Plan for women  With 20% or greater lifetime risk of breast cancer   Per NCCN Breast Cancer Screening and Diagnosis Guidelines Version 1.2021   Recommended screening Test or procedure Last done Next Scheduled    Clinical encounter Clinical exam every 6-12 months.   Refer to genetic counseling if not already done.  Consider risk reduction strategies.   8/24/2021-   April 2022   However, some family histories with breast cancers at a very young age, may warrant screening starting earlier.    *May begin at age 40 if breast cancers in the family occur at later ages.    Annual mammogram beginning 10 years younger than the earliest breast cancer in the family but not prior to age 30.    Recommend annual breast MRI to begin 10 years younger than the earliest breast cancer in the family but not prior to age 25.    Breast MRIs are preferably done on day 7-15 of the menstrual cycle in premenopausal women.   4/21/2021- Screening tomosynthesis mammogram, BiRads1    No history of breast MRI   Breast MRI in October    Next exam: April 2022   Breast screening for patients at high risk due to thoracic radiation between the ages of 10-30   Annual clinical exam beginning 8 years after radiation therapy.    Annual screening mammogram beginning at age 30 or 8 years after radiation therapy    Annual breast MRI, beginning at age 25 or 8 years after radiation therapy.       NA     NA   Women who have a lifetime risk of >20% based on history of LCIS or ADH/ALH Annual screening mammogram beginning at age of LCIS or ADH/ALH but not prior to age 30.    Consider annual MRI to begin at age of diagnosis of LCIS or ADH/ALH but not prior to age 25.    Consider risk reducing strategies.   NA   NA    Recommend risk reducing strategies for women with 1.7% 5 year risk of breast cancer.   NA-1.4% risk within the next 5 years by the Roxanne model  N/A     I spent a total of 78  minutes on the day of the visit. Please see the note for further information on patient assessment and treatment.    THIAGO Mcdonnell-CNS, OCN, AGN-BC  Clinical Nurse Specialist  Cancer Risk Management Program  [facility] [address]  [place] [address]    phone:  [phone]  Pager: [phone]  fax: [phone]    CC:  MD Evelin Delaney APRN-CNP

## 2021-08-24 NOTE — PATIENT INSTRUCTIONS
Individualized Surveillance Plan for women  With 20% or greater lifetime risk of breast cancer   Per NCCN Breast Cancer Screening and Diagnosis Guidelines Version 1.2021   Recommended screening Test or procedure Last done Next Scheduled    Clinical encounter Clinical exam every 6-12 months.   Refer to genetic counseling if not already done.  Consider risk reduction strategies.   8/24/2021-   April 2022   However, some family histories with breast cancers at a very young age, may warrant screening starting earlier.    *May begin at age 40 if breast cancers in the family occur at later ages.    Annual mammogram beginning 10 years younger than the earliest breast cancer in the family but not prior to age 30.    Recommend annual breast MRI to begin 10 years younger than the earliest breast cancer in the family but not prior to age 25.    Breast MRIs are preferably done on day 7-15 of the menstrual cycle in premenopausal women.   4/21/2021- Screening tomosynthesis mammogram, BiRads1    No history of breast MRI   Breast MRI in October    Next exam: April 2022   Breast screening for patients at high risk due to thoracic radiation between the ages of 10-30   Annual clinical exam beginning 8 years after radiation therapy.    Annual screening mammogram beginning at age 30 or 8 years after radiation therapy    Annual breast MRI, beginning at age 25 or 8 years after radiation therapy.       NA     NA   Women who have a lifetime risk of >20% based on history of LCIS or ADH/ALH Annual screening mammogram beginning at age of LCIS or ADH/ALH but not prior to age 30.    Consider annual MRI to begin at age of diagnosis of LCIS or ADH/ALH but not prior to age 25.    Consider risk reducing strategies.   NA   NA    Recommend risk reducing strategies for women with 1.7% 5 year risk of breast cancer.   NA-1.4% risk within the next 5 years by the Roxanne model  N/A                           Patient Education   Understanding Breast  Density  What is breast density?  Breasts are made of connective tissue, glandular tissue and fatty tissue. Dense breasts have a lot of the first two types of tissue, but not much fat.  Why is it important?  Having dense breasts means you have a slightly higher risk of getting breast cancer. It also means your mammograms will be harder to read. This is because both dense tissue and lumps look white on a mammogram. Fatty tissue looks black. The pictures below show the 4 levels of breast density:     How do I know if I have dense breasts?  Only a mammogram can tell you if you have dense breasts. The person who reviews your mammogram (radiologist) will give your breasts a density score based on the levels shown above.  What should I do?  Talk to your doctor about your options. But know that mammograms are proven to reduce cancer deaths. Plus, a yearly mammogram is even more important for women who have a higher risk of breast cancer. Your doctor may order other tests as well.  You should also know how your breasts look and feel. Any time you feel or see something that isn't normal, tell your doctor.  For informational purposes only. Not to replace the advice of your health care provider. Images courtesy American College of Radiology (ACR)   and Society of Breast Imaging (SBI). Used with permission. Text copyright   2014 [employer] [facility]. All rights reserved. [employer] 331754 - 08/14.       Patient Education     Magnetic Resonance Imaging (MRI) of the Breast   Magnetic resonance imaging (MRI) of the breast is an imaging test. It uses strong magnets and radio waves to make pictures of the inside of the breast. It also makes images of the tissues around the breast. Breast MRI is used to check for problems, such as a leaking breast implant or a lump or growth. In some cases, such as women with high risk for breast cancer, a breast MRI may be used along with a mammogram to view breast tissue. It can show kinds of  tissue that mammograms can't. It can be used to guide breast biopsies that can't be done by other methods. It can help with diagnosis and management. Most MRI tests take 30 to 60 minutes. Depending on the type of MRI you are having, the test may take longer. Give yourself extra time to check in.       During a breast MRI, you lie face down on a platform that slides into a tubelike machine called a scanner.   Before your test     Follow any directions you are given for taking medicines and for not eating or drinking before the test.    Follow your normal daily routine unless your provider tells you otherwise.    You ll be asked to remove your watch, hearing aids, credit cards, pens, eyeglasses, and other metal objects.    You may be asked to remove your makeup. Makeup may contain some metal.    What to know  MRI uses strong magnets. Metal is affected by magnets and can distort the image. The magnet used in MRI can cause metal objects in your body to move. If you have a metal implant, you may not be able to have an MRI unless the implant is certified as MRI-safe. People with these implants should not have an MRI:     Ear (cochlear) implants    Certain clips used for brain aneurysms    Certain metal coils put in blood vessels    Most defibrillators    Most pacemakers  The radiologist or technologist may ask you if you:     Have had stereotactic breast biopsy    Have a pacemaker or implanted cardiac defibrillator    Have an artificial body part (prosthesis)    Have metal rods, screws, plates, or splinters in your body    Wear a medicated adhesive patch    Have tattoos or body piercings. Some tattoo inks contain metal.    Have implanted nerve stimulators or medicine-infusion ports    Work with metal    Have braces    Have a bullet or other metal in your body    Have had an eye injury involving a metal object  Tell the radiologist or technologist if you:     Are allergic to any medicines    Are pregnant or think you may  be pregnant    Are afraid of small, enclosed spaces (claustrophobic)    Have had past surgeries  Tell your healthcare provider and the technician if you:     Have ever had an imaging test such as MRI or CT with contrast dye    Are allergic to contrast dye, shellfish, or any medicines    Have a serious health problem. This includes diabetes or kidney disease, or a past liver transplant. You may not be able to have the contrast material used for MRI.    Are pregnant or may be pregnant, or are breastfeeding  During your test     You may be asked to wear a hospital gown.    You may be given earplugs to wear if you choose.    You may be injected with contrast dye through an IV line in your hand or arm. This is a special dye that makes the MRI image sharp. You may need this, depending on the reason for your exam.    You ll lie on a platform that slides into a tube-like machine called a scanner. You ll be on your stomach with your breasts placed through openings in the platform.    Remain as still as you can while the camera takes the pictures. This will ensure the best images.    After your test     You can get back to normal activities right away.    If you were given contrast, it will pass naturally through your body within a day.    Drink lots of water so that the dye passes quickly out of your body.    Getting your results   Your healthcare provider will discuss the test results with you during a follow-up appointment or over the phone.   [employer] last reviewed this educational content on 4/1/2020 2000-2021 [employer]. All rights reserved. This information is not intended as a substitute for professional medical care. Always follow your healthcare professional's instructions.

## 2021-08-24 NOTE — NURSING NOTE
"Oncology Rooming Note    August 24, 2021 11:57 AM   Shauna Becerra is a 46 year old female who presents for:    Chief Complaint   Patient presents with     Oncology Clinic Visit     Family history of breast cancer     Initial Vitals: /71   Pulse 65   Temp 98  F (36.7  C)   Resp 16   Ht 1.676 m (5' 6\")   Wt 82.8 kg (182 lb 8 oz)   LMP 08/17/2021   SpO2 98%   BMI 29.46 kg/m   Estimated body mass index is 29.46 kg/m  as calculated from the following:    Height as of this encounter: 1.676 m (5' 6\").    Weight as of this encounter: 82.8 kg (182 lb 8 oz). Body surface area is 1.96 meters squared.  No Pain (0) Comment: Data Unavailable   Patient's last menstrual period was 08/17/2021.  Allergies reviewed: Yes  Medications reviewed: Yes    Medications: Medication refills not needed today.  Pharmacy name entered into [facility]:    [id number] [place] [facility] [address] [place] [id number] [place] [address]    Clinical concerns: New patient       Popeye Maloney MA            "

## 2021-08-24 NOTE — PROGRESS NOTES
Oncology Risk Management Consultation:  Date on this visit: 2021    Shauna Becerra  is referred by Ninfa Law, Licensed Genetic Counselor,  for an oncology risk management consultation. She requires high risk screening and surveillance to reduce her risk of cancer secondary to having a family history of breast cancer in her mother, maternal aunt, and maternal grandmother. She is considered to at high risk for breast cancer and has a 31.4% lifetime risk for breast cancer by the KWESI model.  She has a 1.4% risk for breast cancer within the next 5 years by the Roxanne model.    Primary Physician: THIAGO Yip-CNP    History Of Present Illness:  Ms. Becerra is a very pleasant, healthy 46 year old female who presents with a family history of breast cancer.    Pertinent history:  Menarche  at age 15  First child at age 41  Perimenopausal.   LMP:  - some short cycles, some longer. Heavier than previously. Some erratic cycles, more than 30 days.  Hx of breastfeeding: abut 3 months  History of Factor V Leiden  Breast density: heterogeneously dense  Ovaries, fallopian tubes and uterus in place  No hx of hormone replacement therapy.    No history of atypia and malignancy.    Genetic testin2020 -- negative for mutations in the APC, LYNN, AXIN2, BARD1, BMPR1A, BRCA1, BRCA2, BRIP1, CDH1, CDK4, CDKN2A, CHEK2, DICER1, EPCAM, GREM1, HOXB13, MLH1, MSH2, MSH3, MSH6, MUTYH, NBN, NF1, NTHL1, PALB2, PMS2, POLD1, POLE, PTEN, RAD51C, RAD51D, RECQL, SMAD4, SMARCA4, STK11 and TP53 genes. No mutations were found in any of the 36 genes analyzed.using the BRCA1/2 and Miranda Syndrome Analyses with the Cancer Next panel from [employer]. This testing was done because of Shauna's family history of breast, pancreatic, and uterine cancer. Of note, Shauna requested today's date to review her test results due to a change in insurance coverage.    Pertinent screening history:  2013- Bilateral  diagnostic mammogram and left breast US for palpable lump Palpable lump left breast. Patient noticed lump 3 weeks   prior and feels it has decreased in size since that time. BiRads1  10/18/2017- Screening mammogram, BiRads1  10/18/2018- Screening mammogram, BiRads1  10/17/2019- Screening mammogram, BiRads1  4/17/2020- Diagnostic left breast mammogram and US, for Palpable inferior left breast area of concern, both BiRads1  4/21/2021- Screening tomosynthesis mammogram, BiRads1  No history of breast MRI    At this visit, she does not practice self exams routinely but denies new fatigue, breast pain, asymmetry, lumps, masses, thickening, nipple discharge and skin changes in her breasts.    Past Medical/Surgical History:  Past Medical History:   Diagnosis Date     Acne      Asthma      Embolus (H) 26 yo    of lung from right leg-was on coumadin for a long while     Factor V Leiden (H)     cannot have birth control     GERD (gastroesophageal reflux disease)      Menarche age 12+    cycles q  24-27 x 4-5d      Past Surgical History:   Procedure Laterality Date     HC TOOTH EXTRACTION W/FORCEP         Allergies:  Allergies as of 08/24/2021 - Reviewed 08/24/2021   Allergen Reaction Noted     Nkda [no known drug allergies]  08/30/2013       Current Medications:  Current Outpatient Medications   Medication Sig Dispense Refill     albuterol (PROAIR HFA/PROVENTIL HFA/VENTOLIN HFA) 108 (90 Base) MCG/ACT inhaler INHALE 2 PUFFS BY MOUTH EVERY 6 HOURS AS NEEDED FOR WHEEZE OR FOR SHORTNESS OF BREATH 18 g 1     fluticasone (FLOVENT HFA) 110 MCG/ACT inhaler TAKE 1 PUFF BY MOUTH TWICE A DAY (Patient taking differently: daily as needed TAKE 1 PUFF BY MOUTH TWICE A DAY) 12 g 1     valACYclovir (VALTREX) 500 MG tablet Take 1 tablet (500 mg) by mouth daily 90 tablet 3     valACYclovir (VALTREX) 1000 mg tablet Take 2 tablets (2,000 mg) by mouth 2 times daily (Patient not taking: Reported on 8/24/2021) 4 tablet 3        Family  History:  Family History   Problem Relation Age of Onset     Thrombophilia Mother      Breast Cancer Mother 67        radiation and lumpectomy     Migraines Mother      Genitourinary Problems Mother      GERD Mother      Pancreatic Cancer Father 63        hx of smoking     Breast Cancer Maternal Grandmother 70     Lung Cancer Maternal Grandfather 89     Skin Cancer Maternal Grandfather      Breast Cancer Paternal Grandmother         after age 50     Uterine Cancer Paternal Grandmother         after age 50     Pancreatic Cancer Paternal Grandmother 75     Chronic Obstructive Pulmonary Disease Paternal Grandfather      Breast Cancer Maternal Aunt 55     Pancreatic Cancer Paternal Aunt 55        paternal grandfather's sister     Deep Vein Thrombosis Other        Social History:  Social History     Socioeconomic History     Marital status: Single     Spouse name: Not on file     Number of children: 1     Years of education: Not on file     Highest education level: Not on file   Occupational History     Occupation: PHd     Employer: [employer]     Comment: biological science, research   Tobacco Use     Smoking status: Never Smoker     Smokeless tobacco: Never Used   Substance and Sexual Activity     Alcohol use: Yes     Alcohol/week: 0.0 standard drinks     Drug use: No     Sexual activity: Yes     Partners: Male     Birth control/protection: None   Other Topics Concern     Parent/sibling w/ CABG, MI or angioplasty before 65F 55M? No   Social History Narrative    Caffeine intake/servings daily - 0    Calcium intake/servings daily - 3    Exercise 5 times weekly - describe ; cardio, weights, bikes    Sunscreen used - Yes    Seatbelts used - Yes    Guns stored in the home - No    Self Breast Exam - Yes    Pap test up to date -  Due for pap    Eye exam up to date -  Yes    Dental exam up to date -  Yes    DEXA scan up to date -  No    Flex Sig/Colonoscopy up to date -  No    Mammography up to date -  Yes , family history of  "breast cancer    Immunizations reviewed and up to date - Yes    Abuse: Current or Past (Physical, Sexual or Emotional) - No    Do you feel safe in your environment - Yes    Do you cope well with stress - Yes    Do you suffer from insomnia - No    Last updated by: Kari Lopez  2/23/2016             Social Determinants of Health     Financial Resource Strain:      Difficulty of Paying Living Expenses:    Food Insecurity:      Worried About Running Out of Food in the Last Year:      Ran Out of Food in the Last Year:    Transportation Needs:      Lack of Transportation (Medical):      Lack of Transportation (Non-Medical):    Physical Activity:      Days of Exercise per Week:      Minutes of Exercise per Session:    Stress:      Feeling of Stress :    Social Connections:      Frequency of Communication with Friends and Family:      Frequency of Social Gatherings with Friends and Family:      Attends Jain Services:      Active Member of Clubs or Organizations:      Attends Club or Organization Meetings:      Marital Status:    Intimate Partner Violence: Not At Risk     Fear of Current or Ex-Partner: No     Emotionally Abused: No     Physically Abused: No     Sexually Abused: No       Physical Exam:  /71   Pulse 65   Temp 98  F (36.7  C)   Resp 16   Ht 1.676 m (5' 6\")   Wt 82.8 kg (182 lb 8 oz)   LMP 08/17/2021   SpO2 98%   BMI 29.46 kg/m      GENERAL APPEARANCE: healthy, alert and no distress     NECK: no adenopathy, no asymmetry or masses     LYMPHATICS: No cervical, supraclavicular or axillary lymphadenopathy     RESP: lungs clear to auscultation - no rales, rhonchi or wheezes     BREAST: A multipositional, bilateral breast exam was performed.  Fairly symmetrical breasts. Right breast: no palpable dominant masses, no nipple discharge, no skin changes. Dense tissue.  Right axilla: no palpable adenopathy. Left breast: no palpable dominant masses, no nipple discharge, no skin changes. " Left axilla: no palpable adenopathy. Dense tissue with fibrocystic tissue throughout.  CARDIOVASCULAR: regular rate and rhythm, normal S1 S2, no S3 or S4 and no murmur.   SKIN: diffuse raised red lesions along bra line, some  Large with white heads.     Laboratory/Imaging Studies  No results found for any visits on 08/24/21.    ASSESSMENT  We reviewed her family history of pancreatic cancer and her issues with low enzyme production, which she has seen GABRIEL Harman, for.  Her mother has a history a pancreatic neoplasm, possibly an IPMN, as well. I am referring her for a consultation about pancreatic screening to Dr. Qamar Bridges.    We also discussed her history of cystic acne in her breast area; she has a dermatologist that she has seen before and may return for care with.  She is currently moving to [place], [place] on Thursday, where her partner will be doing a residency in orthopedic prosthesis development and she declines a referral to the [facility] today. She will consider whether she will have a breast MRI annually as she does have concerns about the gadolinium given annually. She will have the breast MRI in October, as a baseline.    We discussed that the Breast MRI in high-risk women has a higher sensitivity than mammography, and the combination of mammography and MRI in this population has the highest sensitivity. In a high-risk population, MRI and mammography combined have a higher sensitivity (92.7%) than US and mammography combined (52%) (Source: Chance ELLIS, Gibson Z, Julio D, et al. Detection of breast cancer with addition of annual screening ultrasound or a single screening MRI to mammography in women with elevated breast cancer risk. WES.2012;307(13):3919-8749. )    Therefore, in high-risk women for whom supplemental screening is indicated, MRI is recommended when possible. Screening high-risk women with breast MRI is cost-effective, and the cost-effectiveness of screening MRI  increases with increasing breast cancer risk ( Donna et. al 2006 and Randal et al. 2009).     The National Comprehensive Cancer Society, American Cancer Society and American College of Radiologists recommend breast-screening MRI in high-risk women.    In her case, I have ordered a breast MRI (IMG 1045) using the diagnostic codes:  Breast Screening, high risk patient (Z12.39)  Dense Breasts (R92.2)  Family history of breast cancer (Z80.3)    We discussed signs and symptoms to be watchful for in between visits. She verbalized understanding of signs and symptoms of breast issues to address with her health care providers in between visits, including lumps, thickening, swelling, tenderness, nipple discharge or changes in skin of breasts.    We also discussed following  a healthy lifestyle plan recommended by both NCCN and the American Cancer Society that can reduce the risk of breast cancer:  1 Limit alcohol consumption to less than 1 drink per day (1 drink=5 oz.wine, 12 oz. Beer or 1.5 oz. 80-proof liquor).  2. Exercise per American Cancer Society guidelines of at least 150 minutes of moderate-intensity activity or 75 minutes of vigorous activity each week. (Or a combination of both.) Exercise should be spread  out over the week. Regular recreational exercise has been shown to reduce the risk of cancer by 30%.   3. Maintain a healthy weight with a Body Mass Index between 19-24.9. A postmenopausal BMI of 30.7 has been shown to have increased the risk for breast cancer by 37% in some studies.  4. Do not use tobacco products and limit exposure to passive smoke.  5. Breastfeed if possible. Research shows that 16+ months of breastfeeding, over a lifetime, can reduce a woman's risk of breast cancer by up to 27%.      Individualized Surveillance Plan for women  With 20% or greater lifetime risk of breast cancer   Per NCCN Breast Cancer Screening and Diagnosis Guidelines Version 1.2021   Recommended screening Test or  procedure Last done Next Scheduled    Clinical encounter Clinical exam every 6-12 months.   Refer to genetic counseling if not already done.  Consider risk reduction strategies.   8/24/2021-   April 2022   However, some family histories with breast cancers at a very young age, may warrant screening starting earlier.    *May begin at age 40 if breast cancers in the family occur at later ages.    Annual mammogram beginning 10 years younger than the earliest breast cancer in the family but not prior to age 30.    Recommend annual breast MRI to begin 10 years younger than the earliest breast cancer in the family but not prior to age 25.    Breast MRIs are preferably done on day 7-15 of the menstrual cycle in premenopausal women.   4/21/2021- Screening tomosynthesis mammogram, BiRads1    No history of breast MRI   Breast MRI in October    Next exam: April 2022   Breast screening for patients at high risk due to thoracic radiation between the ages of 10-30   Annual clinical exam beginning 8 years after radiation therapy.    Annual screening mammogram beginning at age 30 or 8 years after radiation therapy    Annual breast MRI, beginning at age 25 or 8 years after radiation therapy.       NA     NA   Women who have a lifetime risk of >20% based on history of LCIS or ADH/ALH Annual screening mammogram beginning at age of LCIS or ADH/ALH but not prior to age 30.    Consider annual MRI to begin at age of diagnosis of LCIS or ADH/ALH but not prior to age 25.    Consider risk reducing strategies.   NA   NA    Recommend risk reducing strategies for women with 1.7% 5 year risk of breast cancer.   NA-1.4% risk within the next 5 years by the Roxanne model  N/A     I spent a total of 78 minutes on the day of the visit. Please see the note for further information on patient assessment and treatment.    THIAGO Mcdonnell-CNS, OCN, AGN-BC  Clinical Nurse Specialist  Cancer Risk Management Program  [address]   [facility] [address]  [place] [address]    phone:  [phone]  Pager: [phone]  fax: [phone]    CC:  MD Evelin Delaney, THIAGO-CNP

## 2021-08-25 ENCOUNTER — PATIENT OUTREACH (OUTPATIENT)
Dept: GASTROENTEROLOGY | Facility: CLINIC | Age: 46
End: 2021-08-25

## 2021-08-31 NOTE — TELEPHONE ENCOUNTER
Returned call r/t referring from Leonela Owen, video clinic scheduled with Dr. Bridges 10-28    ML

## 2021-09-15 ENCOUNTER — APPOINTMENT (OUTPATIENT)
Dept: LAB | Facility: CLINIC | Age: 46
End: 2021-09-15
Payer: COMMERCIAL

## 2021-09-19 ENCOUNTER — HEALTH MAINTENANCE LETTER (OUTPATIENT)
Age: 46
End: 2021-09-19

## 2021-10-27 ENCOUNTER — ANCILLARY PROCEDURE (OUTPATIENT)
Dept: MRI IMAGING | Facility: CLINIC | Age: 46
End: 2021-10-27
Attending: CLINICAL NURSE SPECIALIST
Payer: COMMERCIAL

## 2021-10-27 DIAGNOSIS — Z12.39 BREAST CANCER SCREENING, HIGH RISK PATIENT: ICD-10-CM

## 2021-10-27 DIAGNOSIS — R92.30 DENSE BREAST: ICD-10-CM

## 2021-10-27 PROCEDURE — 77049 MRI BREAST C-+ W/CAD BI: CPT | Performed by: RADIOLOGY

## 2021-10-27 PROCEDURE — A9585 GADOBUTROL INJECTION: HCPCS | Performed by: RADIOLOGY

## 2021-10-27 RX ORDER — GADOBUTROL 604.72 MG/ML
10 INJECTION INTRAVENOUS ONCE
Status: COMPLETED | OUTPATIENT
Start: 2021-10-27 | End: 2021-10-27

## 2021-10-27 RX ADMIN — GADOBUTROL 8 ML: 604.72 INJECTION INTRAVENOUS at 11:02

## 2021-10-28 ENCOUNTER — VIRTUAL VISIT (OUTPATIENT)
Dept: GASTROENTEROLOGY | Facility: CLINIC | Age: 46
End: 2021-10-28
Payer: COMMERCIAL

## 2021-10-28 VITALS — BODY MASS INDEX: 28.93 KG/M2 | HEIGHT: 66 IN | WEIGHT: 180 LBS

## 2021-10-28 DIAGNOSIS — Z80.0 FAMILY HISTORY OF PANCREATIC CANCER: ICD-10-CM

## 2021-10-28 PROCEDURE — 99204 OFFICE O/P NEW MOD 45 MIN: CPT | Mod: 95 | Performed by: INTERNAL MEDICINE

## 2021-10-28 ASSESSMENT — PAIN SCALES - GENERAL: PAINLEVEL: NO PAIN (0)

## 2021-10-28 ASSESSMENT — MIFFLIN-ST. JEOR: SCORE: 1483.22

## 2021-10-28 NOTE — LETTER
"    10/28/2021         RE: Shauna Becerra  [address] [place] [address]        Dear Colleague,    Thank you for referring your patient, Shauna Becerra, to the [facility]. Please see a copy of my visit note below.        [facility]    The patient has been notified of following:     \"This video visit will be conducted via a call between you and your physician/provider. We have found that certain health care needs can be provided without the need for an in-person physical exam.  This service lets us provide the care you need with a video conversation.  If a prescription is necessary we can send it directly to your pharmacy.  If lab work is needed we can place an order for that and you can then stop by our lab to have the test done at a later time.    Video visits are billed at different rates depending on your insurance coverage.  Please reach out to your insurance provider with any questions.    If during the course of the call the physician/provider feels a video visit is not appropriate, you will not be charged for this service.\"    Patient has given verbal consent for Video visit? Yes  How would you like to obtain your AVS? My Chart  If you are dropped from the video visit, the video invite should be resent to: Cell phone  Will anyone else be joining your video visit? No  {If patient encounters technical issues they should call [phone]     Video-Visit Details    Type of service:  Video Visit    Video Start Time: 1015  Video End Time: 1100    Originating Location (pt. Location): Work    Distant Location (provider location):  [facility]     Platform used for Video Visit: well    Referring provider  Leonela Owen  Query Family history of pancreatic cancer    HPI  Ms Becerra is a 47yo woman with factor V Leiden who has a strong family history of pancreatic " cancer including her father at 63 (first degree), paternal grandmother at 75 (second degree) and paternal (grandfather's sister, second degree) at an unknown age who now presents for discussion of pancreatic cancer screening.    Genetic testing has been negative for both pancreatic (and breast).    Review of her chart suggests no previous cross sectional imaging of her abdomen for review. She infrequent bouts of nausea for which she sees a functional nutritonist. They suggested pancreatic insufficiency but fecal elastase was not tested. She noted improvement with a change in diet along with over the counter enzymes. She does not drink alcohol nor does she smoke. She has a BMI approaching 30.    Review of Systems   ROS COMP: Constitutional, HEENT, cardiovascular, pulmonary, GI, , musculoskeletal, neuro, skin, endocrine and psych systems are negative, except as otherwise noted.    Medications  Current Outpatient Medications   Medication     albuterol (PROAIR HFA/PROVENTIL HFA/VENTOLIN HFA) 108 (90 Base) MCG/ACT inhaler     fluticasone (FLOVENT HFA) 110 MCG/ACT inhaler     valACYclovir (VALTREX) 1000 mg tablet     valACYclovir (VALTREX) 500 MG tablet     No current facility-administered medications for this visit.     Past Medical  Past Medical History:   Diagnosis Date     Acne      Asthma      Embolus (H) 28 yo    of lung from right leg-was on coumadin for a long while     Factor V Leiden (H)     cannot have birth control     GERD (gastroesophageal reflux disease)      Menarche age 12+    cycles q  24-27 x 4-5d      Past Surgical  Past Surgical History:   Procedure Laterality Date     HC TOOTH EXTRACTION W/FORCEP       Social History  Social History     Socioeconomic History     Marital status: Single     Spouse name: Not on file     Number of children: 1     Years of education: Not on file     Highest education level: Not on file   Occupational History     Occupation: PHd     Employer: [employer]     Comment:  biological science, research   Tobacco Use     Smoking status: Never Smoker     Smokeless tobacco: Never Used   Substance and Sexual Activity     Alcohol use: Yes     Alcohol/week: 0.0 standard drinks     Drug use: No     Sexual activity: Yes     Partners: Male     Birth control/protection: None   Other Topics Concern     Parent/sibling w/ CABG, MI or angioplasty before 65F 55M? No   Social History Narrative    Caffeine intake/servings daily - 0    Calcium intake/servings daily - 3    Exercise 5 times weekly - describe ; cardio, weights, bikes    Sunscreen used - Yes    Seatbelts used - Yes    Guns stored in the home - No    Self Breast Exam - Yes    Pap test up to date -  Due for pap    Eye exam up to date -  Yes    Dental exam up to date -  Yes    DEXA scan up to date -  No    Flex Sig/Colonoscopy up to date -  No    Mammography up to date -  Yes , family history of breast cancer    Immunizations reviewed and up to date - Yes    Abuse: Current or Past (Physical, Sexual or Emotional) - No    Do you feel safe in your environment - Yes    Do you cope well with stress - Yes    Do you suffer from insomnia - No    Last updated by: Kari Lopez  2/23/2016             Social Determinants of Health     Financial Resource Strain:      Difficulty of Paying Living Expenses:    Food Insecurity:      Worried About Running Out of Food in the Last Year:      Ran Out of Food in the Last Year:    Transportation Needs:      Lack of Transportation (Medical):      Lack of Transportation (Non-Medical):    Physical Activity:      Days of Exercise per Week:      Minutes of Exercise per Session:    Stress:      Feeling of Stress :    Social Connections:      Frequency of Communication with Friends and Family:      Frequency of Social Gatherings with Friends and Family:      Attends [employer] Services:      Active Member of Clubs or Organizations:      Attends Club or Organization Meetings:      Marital Status:    Intimate  Partner Violence: Not At Risk     Fear of Current or Ex-Partner: No     Emotionally Abused: No     Physically Abused: No     Sexually Abused: No     Family History  Family History   Problem Relation Age of Onset     Thrombophilia Mother      Breast Cancer Mother 67        radiation and lumpectomy     Migraines Mother      Genitourinary Problems Mother      GERD Mother      Pancreatic Cancer Father 63        hx of smoking     Breast Cancer Maternal Grandmother 70     Lung Cancer Maternal Grandfather 89     Skin Cancer Maternal Grandfather      Breast Cancer Paternal Grandmother         after age 50     Uterine Cancer Paternal Grandmother         after age 50     Pancreatic Cancer Paternal Grandmother 75     Chronic Obstructive Pulmonary Disease Paternal Grandfather      Breast Cancer Maternal Aunt 55     Pancreatic Cancer Paternal Aunt 55        paternal grandfather's sister     Deep Vein Thrombosis Other      Objective:  Reported vitals:  There were no vitals taken for this visit.   GEN: Healthy, alert and no distress  PSYCH: Alert and oriented times 3; coherent speech, normal rate and volume, able to articulate logical thoughts, able to abstract reason, no tangential thoughts, no hallucinations or delusions, affect seems normal  RESP: No cough, no audible wheezing, able to talk in full sentences  Remainder of exam unable to be completed due to virtual visit     Outside Imaging summaries:    Assessment and plan:  Ms Becerra is a 45yo woman with a first degree and two second degree relatives diagnosed with pancreatic cancer, one as young as age 63, though her great aunt's age was unkown. We discussed current guidelines suggesting that she meets criteria for pancreatic cancer screening. Options for screening include and MRI as well as endoscopic ultrasound. Given the need for sedation with the latter, we will recommend MRI as this modality has sensitivity approaching (if not equivocal) to that of EUS.  Recommendations would include starting this yearly screening 10y prior to the age of her youngest afflicted relative (53) or at the age of 50. Therefore, the MRI will be organized for age 50. With any suspicious finding, we will reflex an endoscopic ultrasound for more intimate evaluation affording the ability of sampling by fine needle aspiration or biopsy. We took this opportunity to discuss the technique of EUS as well as the rare risks of bleeding and perforation. We also reviewed the concept of side branched intraductal mucinous neoplasms which are small, simple fluid collections that have a low malignancy potential. These are frequently seen incidentally, and themselves require surveillance.    Plan:  MRI/MRCP with gadolinium at the age of 50 followed by yearly interval MRI thereafter    It was a pleasure to participate in the care of this patient; please contact us with any further questions.  A total of at least 45 minutes was spent in evaluation with this patient, >50% of which was counseling regarding the above delineated issues.    Qamar Bridges MD PhD FACG LAVON MORRIS  Director of Endoscopy  Associate Professor of Medicine, Surgery and Pediatrics  Interventional and Therapeutic Endoscopy    [facility]  Division of Gastroenterology and Hepatology  [facility] [address] [place], [place] [address]    New Consultations  [phone]  Procedure Scheduling [phone]  Clinical Nurse Coordinator [phone]  Clinical Fax   [phone]  Administrative   [phone]  Administrative Fax  [phone]

## 2021-10-28 NOTE — NURSING NOTE
"Chief Complaint   Patient presents with     Consult     History of pancreatic cancer in relatives.       Vitals:    10/28/21 0946   Weight: 81.6 kg (180 lb)   Height: 1.676 m (5' 6\")       Body mass index is 29.05 kg/m .                          Carmelita Apple, EMT    "

## 2021-10-28 NOTE — PROGRESS NOTES
"    [facility]    The patient has been notified of following:     \"This video visit will be conducted via a call between you and your physician/provider. We have found that certain health care needs can be provided without the need for an in-person physical exam.  This service lets us provide the care you need with a video conversation.  If a prescription is necessary we can send it directly to your pharmacy.  If lab work is needed we can place an order for that and you can then stop by our lab to have the test done at a later time.    Video visits are billed at different rates depending on your insurance coverage.  Please reach out to your insurance provider with any questions.    If during the course of the call the physician/provider feels a video visit is not appropriate, you will not be charged for this service.\"    Patient has given verbal consent for Video visit? Yes  How would you like to obtain your AVS? My Chart  If you are dropped from the video visit, the video invite should be resent to: Cell phone  Will anyone else be joining your video visit? No  {If patient encounters technical issues they should call [phone]     Video-Visit Details    Type of service:  Video Visit    Video Start Time: 1015  Video End Time: 1100    Originating Location (pt. Location): Work    Distant Location (provider location):  [facility] [place]     Platform used for Video Visit: Picturelife    Referring provider  Leonela Owen  Query Family history of pancreatic cancer    HPI  Ms Becerra is a 45yo woman with factor V Leiden who has a strong family history of pancreatic cancer including her father at 63 (first degree), paternal grandmother at 75 (second degree) and paternal (grandfather's sister, second degree) at an unknown age who now presents for discussion of pancreatic cancer screening.    Genetic testing has been negative for both pancreatic " (and breast).    Review of her chart suggests no previous cross sectional imaging of her abdomen for review. She infrequent bouts of nausea for which she sees a functional nutritonist. They suggested pancreatic insufficiency but fecal elastase was not tested. She noted improvement with a change in diet along with over the counter enzymes. She does not drink alcohol nor does she smoke. She has a BMI approaching 30.    Review of Systems   ROS COMP: Constitutional, HEENT, cardiovascular, pulmonary, GI, , musculoskeletal, neuro, skin, endocrine and psych systems are negative, except as otherwise noted.    Medications  Current Outpatient Medications   Medication     albuterol (PROAIR HFA/PROVENTIL HFA/VENTOLIN HFA) 108 (90 Base) MCG/ACT inhaler     fluticasone (FLOVENT HFA) 110 MCG/ACT inhaler     valACYclovir (VALTREX) 1000 mg tablet     valACYclovir (VALTREX) 500 MG tablet     No current facility-administered medications for this visit.     Past Medical  Past Medical History:   Diagnosis Date     Acne      Asthma      Embolus (H) 26 yo    of lung from right leg-was on coumadin for a long while     Factor V Leiden (H)     cannot have birth control     GERD (gastroesophageal reflux disease)      Menarche age 12+    cycles q  24-27 x 4-5d      Past Surgical  Past Surgical History:   Procedure Laterality Date     HC TOOTH EXTRACTION W/FORCEP       Social History  Social History     Socioeconomic History     Marital status: Single     Spouse name: Not on file     Number of children: 1     Years of education: Not on file     Highest education level: Not on file   Occupational History     Occupation: PHd     Employer: [employer]     Comment: biological science, research   Tobacco Use     Smoking status: Never Smoker     Smokeless tobacco: Never Used   Substance and Sexual Activity     Alcohol use: Yes     Alcohol/week: 0.0 standard drinks     Drug use: No     Sexual activity: Yes     Partners: Male     Birth  control/protection: None   Other Topics Concern     Parent/sibling w/ CABG, MI or angioplasty before 65F 55M? No   Social History Narrative    Caffeine intake/servings daily - 0    Calcium intake/servings daily - 3    Exercise 5 times weekly - describe ; cardio, weights, bikes    Sunscreen used - Yes    Seatbelts used - Yes    Guns stored in the home - No    Self Breast Exam - Yes    Pap test up to date -  Due for pap    Eye exam up to date -  Yes    Dental exam up to date -  Yes    DEXA scan up to date -  No    Flex Sig/Colonoscopy up to date -  No    Mammography up to date -  Yes , family history of breast cancer    Immunizations reviewed and up to date - Yes    Abuse: Current or Past (Physical, Sexual or Emotional) - No    Do you feel safe in your environment - Yes    Do you cope well with stress - Yes    Do you suffer from insomnia - No    Last updated by: Kari Lopez  2/23/2016             Social Determinants of Health     Financial Resource Strain:      Difficulty of Paying Living Expenses:    Food Insecurity:      Worried About Running Out of Food in the Last Year:      Ran Out of Food in the Last Year:    Transportation Needs:      Lack of Transportation (Medical):      Lack of Transportation (Non-Medical):    Physical Activity:      Days of Exercise per Week:      Minutes of Exercise per Session:    Stress:      Feeling of Stress :    Social Connections:      Frequency of Communication with Friends and Family:      Frequency of Social Gatherings with Friends and Family:      Attends Synagogue Services:      Active Member of Clubs or Organizations:      Attends Club or Organization Meetings:      Marital Status:    Intimate Partner Violence: Not At Risk     Fear of Current or Ex-Partner: No     Emotionally Abused: No     Physically Abused: No     Sexually Abused: No     Family History  Family History   Problem Relation Age of Onset     Thrombophilia Mother      Breast Cancer Mother 67         radiation and lumpectomy     Migraines Mother      Genitourinary Problems Mother      GERD Mother      Pancreatic Cancer Father 63        hx of smoking     Breast Cancer Maternal Grandmother 70     Lung Cancer Maternal Grandfather 89     Skin Cancer Maternal Grandfather      Breast Cancer Paternal Grandmother         after age 50     Uterine Cancer Paternal Grandmother         after age 50     Pancreatic Cancer Paternal Grandmother 75     Chronic Obstructive Pulmonary Disease Paternal Grandfather      Breast Cancer Maternal Aunt 55     Pancreatic Cancer Paternal Aunt 55        paternal grandfather's sister     Deep Vein Thrombosis Other      Objective:  Reported vitals:  There were no vitals taken for this visit.   GEN: Healthy, alert and no distress  PSYCH: Alert and oriented times 3; coherent speech, normal rate and volume, able to articulate logical thoughts, able to abstract reason, no tangential thoughts, no hallucinations or delusions, affect seems normal  RESP: No cough, no audible wheezing, able to talk in full sentences  Remainder of exam unable to be completed due to virtual visit     Outside Imaging summaries:    Assessment and plan:  Ms Becerra is a 47yo woman with a first degree and two second degree relatives diagnosed with pancreatic cancer, one as young as age 63, though her great aunt's age was unkown. We discussed current guidelines suggesting that she meets criteria for pancreatic cancer screening. Options for screening include and MRI as well as endoscopic ultrasound. Given the need for sedation with the latter, we will recommend MRI as this modality has sensitivity approaching (if not equivocal) to that of EUS. Recommendations would include starting this yearly screening 10y prior to the age of her youngest afflicted relative (53) or at the age of 50. Therefore, the MRI will be organized for age 50. With any suspicious finding, we will reflex an endoscopic ultrasound for more intimate  evaluation affording the ability of sampling by fine needle aspiration or biopsy. We took this opportunity to discuss the technique of EUS as well as the rare risks of bleeding and perforation. We also reviewed the concept of side branched intraductal mucinous neoplasms which are small, simple fluid collections that have a low malignancy potential. These are frequently seen incidentally, and themselves require surveillance.    Plan:  MRI/MRCP with gadolinium at the age of 50 followed by yearly interval MRI thereafter    It was a pleasure to participate in the care of this patient; please contact us with any further questions.  A total of at least 45 minutes was spent in evaluation with this patient, >50% of which was counseling regarding the above delineated issues.    Qamar Bridges MD PhD FACG LAVON MORRIS  Director of Endoscopy  Associate Professor of Medicine, Surgery and Pediatrics  Interventional and Therapeutic Endoscopy    [facility]  Division of Gastroenterology and Hepatology  [facility] [address] [place], [place] [address]    New Consultations  [phone]  Procedure Scheduling [phone]  Clinical Nurse Coordinator [phone]  Clinical Fax   [phone]  Administrative   [phone]  Administrative Fax  [phone]

## 2021-10-29 NOTE — PATIENT INSTRUCTIONS
Follow up:    Dr. Bridges has outlined the following steps after your recent clinic visit:    Plan:  MRI/MRCP with gadolinium at the age of 52 followed by yearly interval MRI thereafter.     Please call [phone] to schedule the imaging.       Please call with any questions or concerns regarding your clinic visit today.    It is a pleasure being involved in your health care.    Contacts post-consultation depending on your need:    Schedule Clinic Appointments            [phone] # 1   M-F 7:30 - 5 pm    Radha Bueno RN Care Coordinator  [phone]    Angel Riojas LPN    [phone]     OR Procedure Scheduling                             [phone]    My Chart is available 24 hours a day and is a secure way to access your records and communicate with your care team.  I strongly recommend signing up if you haven't already done so, if you are comfortable with computers.  If you would like to inquire about this or are having problems with My Chart access, you may call [phone] or go online at [email or web address].  Please allow at least 24 hours for a response and extra time on weekends and Holidays.

## 2022-04-26 ENCOUNTER — ONCOLOGY VISIT (OUTPATIENT)
Dept: ONCOLOGY | Facility: CLINIC | Age: 47
End: 2022-04-26
Attending: CLINICAL NURSE SPECIALIST
Payer: COMMERCIAL

## 2022-04-26 ENCOUNTER — ANCILLARY PROCEDURE (OUTPATIENT)
Dept: MAMMOGRAPHY | Facility: CLINIC | Age: 47
End: 2022-04-26
Attending: CLINICAL NURSE SPECIALIST
Payer: COMMERCIAL

## 2022-04-26 VITALS
HEART RATE: 60 BPM | DIASTOLIC BLOOD PRESSURE: 75 MMHG | WEIGHT: 191 LBS | OXYGEN SATURATION: 98 % | BODY MASS INDEX: 30.83 KG/M2 | TEMPERATURE: 98 F | SYSTOLIC BLOOD PRESSURE: 123 MMHG

## 2022-04-26 DIAGNOSIS — Z12.39 BREAST CANCER SCREENING, HIGH RISK PATIENT: Primary | ICD-10-CM

## 2022-04-26 DIAGNOSIS — R92.30 DENSE BREAST: ICD-10-CM

## 2022-04-26 DIAGNOSIS — Z80.3 FAMILY HISTORY OF MALIGNANT NEOPLASM OF BREAST: ICD-10-CM

## 2022-04-26 DIAGNOSIS — Z12.39 BREAST CANCER SCREENING, HIGH RISK PATIENT: ICD-10-CM

## 2022-04-26 PROCEDURE — 99215 OFFICE O/P EST HI 40 MIN: CPT | Performed by: CLINICAL NURSE SPECIALIST

## 2022-04-26 PROCEDURE — G0463 HOSPITAL OUTPT CLINIC VISIT: HCPCS

## 2022-04-26 PROCEDURE — 77063 BREAST TOMOSYNTHESIS BI: CPT | Performed by: RADIOLOGY

## 2022-04-26 PROCEDURE — 77067 SCR MAMMO BI INCL CAD: CPT | Performed by: RADIOLOGY

## 2022-04-26 ASSESSMENT — PAIN SCALES - GENERAL: PAINLEVEL: NO PAIN (0)

## 2022-04-26 NOTE — LETTER
2022         RE: Shauna Becerra  [address] [place] [address]        Dear Colleague,    Thank you for referring your patient, Shauna Becerra, to the [facility]. Please see a copy of my visit note below.    Oncology Risk Management Consultation:  Date on this visit: 2022    Shauna Becerra  requires heightened screening and surveillance for her higher risk of breast cancer, secondary to a family history of breast cancer in her mother, maternal aunt, and maternal grandmother. She is considered to have a 31.4% lifetime risk for breast cancer by the KWESI model.  She has a 1.4% risk for breast cancer within the next 5 years by the Roxanne model. Also present at this visit is Marcia Mccullough,  Doctorate of Nursing Practice student at the [employer], who is shadowing in clinic today.      Primary Physician: THIAGO Yip-CNP    History Of Present Illness:  Ms. Becerra is a very pleasant  47 year old female who presents with a family history of breast cancer.     Pertinent history:  Menarche  at age 15  First child at age 41  Perimenopausal.   LMP: about 30 days apart  Hx of breastfeeding: abut 3 months  History of Factor V Leiden  Breast density: heterogeneously dense  Ovaries, fallopian tubes and uterus in place  No hx of hormone replacement therapy.    No history of atypia and malignancy    History of pancreatic cancer including her father at 63 (first degree), paternal grandmother at 75 (second degree) and paternal (grandfather's sister, second degree) at an unknown age. She is following pancreatic cancer screening with Dr. Qamar Bridges.    Genetic testin2020 -- negative for mutations in the APC, LYNN, AXIN2, BARD1, BMPR1A, BRCA1, BRCA2, BRIP1, CDH1, CDK4, CDKN2A, CHEK2, DICER1, EPCAM, GREM1, HOXB13, MLH1, MSH2, MSH3, MSH6, MUTYH, NBN, NF1, NTHL1, PALB2, PMS2, POLD1, POLE, PTEN, RAD51C, RAD51D, RECQL, SMAD4,  SMARCA4, STK11 and TP53 genes. No mutations were found in any of the 36 genes analyzed.using the BRCA1/2 and Miranda Syndrome Analyses with the Cancer Next panel from [employer]. This testing was done because of Shauna's family history of breast, pancreatic, and uterine cancer. Of note, Shauna requested today's date to review her test results due to a change in insurance coverage.     Pertinent screening history:  7/26/2013- Bilateral diagnostic mammogram and left breast US for palpable lump Palpable lump left breast. Patient noticed lump 3 weeks   prior and feels it has decreased in size since that time. [email or web address]  10/18/2017- Screening mammogram, BiRads1  10/18/2018- Screening mammogram, BiRads1  10/17/2019- Screening mammogram, BiRads1  4/17/2020- Diagnostic left breast mammogram and US, for Palpable inferior left breast area of concern, both BiRads1  4/21/2021- Screening tomosynthesis mammogram, BiRads1  10/27/2021- Breast MRI, BiRads1    At this visit, she does not practice self exams routinely but denies new fatigue, breast pain, asymmetry, lumps, masses, thickening, nipple discharge and skin changes in her breasts.    Past Medical/Surgical History:  Past Medical History:   Diagnosis Date     Acne      Asthma      Embolus (H) 28 yo    of lung from right leg-was on coumadin for a long while     Factor V Leiden (H)     cannot have birth control     GERD (gastroesophageal reflux disease)      Menarche age 12+    cycles q  24-27 x 4-5d      Past Surgical History:   Procedure Laterality Date     HC TOOTH EXTRACTION W/FORCEP         Allergies:  Allergies as of 04/26/2022 - Reviewed 04/26/2022   Allergen Reaction Noted     Nkda [no known drug allergies]  08/30/2013       Current Medications:  Current Outpatient Medications   Medication Sig Dispense Refill     albuterol (PROAIR HFA/PROVENTIL HFA/VENTOLIN HFA) 108 (90 Base) MCG/ACT inhaler INHALE 2 PUFFS BY MOUTH EVERY 6 HOURS AS NEEDED FOR WHEEZE OR FOR SHORTNESS OF  BREATH 18 g 1     fluticasone (FLOVENT HFA) 110 MCG/ACT inhaler TAKE 1 PUFF BY MOUTH TWICE A DAY (Patient taking differently: daily as needed TAKE 1 PUFF BY MOUTH TWICE A DAY) 12 g 1     valACYclovir (VALTREX) 1000 mg tablet Take 2 tablets (2,000 mg) by mouth 2 times daily 4 tablet 3     valACYclovir (VALTREX) 500 MG tablet Take 1 tablet (500 mg) by mouth daily 90 tablet 3        Family History:  Family History   Problem Relation Age of Onset     Thrombophilia Mother      Breast Cancer Mother 67        radiation and lumpectomy     Migraines Mother      Genitourinary Problems Mother      GERD Mother      Pancreatic Cancer Father 63        hx of smoking     Breast Cancer Maternal Grandmother 70     Lung Cancer Maternal Grandfather 89     Skin Cancer Maternal Grandfather      Breast Cancer Paternal Grandmother         after age 50     Uterine Cancer Paternal Grandmother         after age 50     Pancreatic Cancer Paternal Grandmother 75     Chronic Obstructive Pulmonary Disease Paternal Grandfather      Breast Cancer Maternal Aunt 55     Pancreatic Cancer Paternal Aunt 55        paternal grandfather's sister     Deep Vein Thrombosis Other        Social History:  Social History     Socioeconomic History     Marital status: Single     Spouse name: Not on file     Number of children: 1     Years of education: Not on file     Highest education level: Doctorate   Occupational History     Occupation: PHd     Comment: [employer]   Tobacco Use     Smoking status: Never Smoker     Smokeless tobacco: Never Used   Substance and Sexual Activity     Alcohol use: Yes     Alcohol/week: 0.0 standard drinks     Drug use: No     Sexual activity: Yes     Partners: Male     Birth control/protection: None   Other Topics Concern     Parent/sibling w/ CABG, MI or angioplasty before 65F 55M? No   Social History Narrative    Caffeine intake/servings daily - 0    Calcium intake/servings daily - 3     Exercise 5 times weekly - describe ; cardio, weights, bikes    Sunscreen used - Yes    Seatbelts used - Yes    Guns stored in the home - No    Self Breast Exam - Yes    Pap test up to date -  Due for pap    Eye exam up to date -  Yes    Dental exam up to date -  Yes    DEXA scan up to date -  No    Flex Sig/Colonoscopy up to date -  No    Mammography up to date -  Yes , family history of breast cancer    Immunizations reviewed and up to date - Yes    Abuse: Current or Past (Physical, Sexual or Emotional) - No    Do you feel safe in your environment - Yes    Do you cope well with stress - Yes    Do you suffer from insomnia - No    Last updated by: Kari Lopez  2/23/2016             Social Determinants of Health     Financial Resource Strain: Not on file   Food Insecurity: Not on file   Transportation Needs: Not on file   Physical Activity: Not on file   Stress: Not on file   Social Connections: Not on file   Intimate Partner Violence: Not At Risk     Fear of Current or Ex-Partner: No     Emotionally Abused: No     Physically Abused: No     Sexually Abused: No   Housing Stability: Not on file       Physical Exam:  /75   Pulse 60   Temp 98  F (36.7  C) (Oral)   Wt 86.6 kg (191 lb)   SpO2 98%   BMI 30.83 kg/m      GENERAL APPEARANCE: healthy, alert and no distress     NECK: no adenopathy, no asymmetry or masses     RESP: lungs clear to auscultation - no rales, rhonchi or wheezes    BREAST: A multipositional, bilateral breast exam was performed.  Symmetrical breasts, nipples everted bilaterally, mild red raised rash between and underneath breasts. Right breast: no palpable dominant masses, no nipple discharge, no skin changes. Dense tissue.  Right axilla: no palpable adenopathy. Left breast: no palpable dominant masses, no nipple discharge, no skin changes.Minor areas of fibrocystic changes in left breast superior to nipple. Left axilla: no palpable adenopathy. Dense tissue.   LYMPHATICS: No  cervical, supraclavicular or axillary lymphadenopathy     CARDIOVASCULAR: regular rate and rhythm, normal S1 S2, no S3 or S4 and no murmur.       SKIN: no suspicious lesions or rashes  Laboratory/Imaging Studies  No results found for any visits on 04/26/22.    ASSESSMENT  We discussed her last screening tests and reviewed results.  She has been doing well, started a new job with [employer], which she likes, and is looking for a new house in [place].    We discussed that she should start colon screening this year and could decide whether she would like a baseline colonoscopy or a Cologuard test.  She is not considered to be at high risk for colon cancer but should begin screening now.     We also discussed that her pancreatic cancer screening would start at age 50 with an MRCP; there are no new cancers in her family.     Her exam today was unremarkable and she will defer her breast MRI, due to concerns about gadolinium and financial cost. She will see me for another exam and mammogram in one year.  Individualized Surveillance Plan for women  With 20% or greater lifetime risk of breast cancer   Per NCCN Breast Cancer Screening and Diagnosis Guidelines Version 1.2021   Recommended screening Test or procedure Last done Next Scheduled    Clinical encounter Clinical exam every 6-12 months.   Refer to genetic counseling if not already done.  Consider risk reduction strategies. 4/26/2022 April 2023   However, some family histories with breast cancers at a very young age, may warrant screening starting earlier.    *May begin at age 40 if breast cancers in the family occur at later ages.    Annual mammogram beginning 10 years younger than the earliest breast cancer in the family but not prior to age 30.    Recommend annual breast MRI to begin 10 years younger than the earliest breast cancer in the family but not prior to age 25.    Breast MRIs are preferably done on day 7-15 of the menstrual cycle in premenopausal  women. 4/21/2021- Screening tomosynthesis mammogram, BiRads1    10/27/2021- Breast MRI, BiRads1 Mammogram today    Breast MRI deferred in 2022    Next exam: April 2023 followed by mammogram   Breast screening for patients at high risk due to thoracic radiation between the ages of 10-30   Annual clinical exam beginning 8 years after radiation therapy.    Annual screening mammogram beginning at age 30 or 8 years after radiation therapy    Annual breast MRI, beginning at age 25 or 8 years after radiation therapy.   NA NA   Women who have a lifetime risk of >20% based on history of LCIS or ADH/ALH Annual screening mammogram beginning at age of LCIS or ADH/ALH but not prior to age 30.    Consider annual MRI to begin at age of diagnosis of LCIS or ADH/ALH but not prior to age 25.    Consider risk reducing strategies. NA NA    Recommend risk reducing strategies for women with 1.7% 5 year risk of breast cancer. NA- 1.4% risk within the next 5 years by the Roxanne model NA     I spent a total of 58 minutes on the day of the visit. Please see the note for further information on patient assessment and treatment.    THIAGO Mcdonnell-CNS, OCN, AGN-BC  Clinical Nurse Specialist  Cancer Risk Management Program  [facility]      CC: Evelin Tellez MD  .

## 2022-04-26 NOTE — NURSING NOTE
"Oncology Rooming Note    April 26, 2022 12:10 PM   Shauna Becerra is a 47 year old female who presents for:    Chief Complaint   Patient presents with     Oncology Clinic Visit     Family history of malignant neoplasm of breast.     Initial Vitals: /75   Pulse 60   Temp 98  F (36.7  C) (Oral)   Wt 86.6 kg (191 lb)   SpO2 98%   BMI 30.83 kg/m   Estimated body mass index is 30.83 kg/m  as calculated from the following:    Height as of 10/28/21: 1.676 m (5' 6\").    Weight as of this encounter: 86.6 kg (191 lb). Body surface area is 2.01 meters squared.  No Pain (0) Comment: Data Unavailable   No LMP recorded.  Allergies reviewed: Yes  Medications reviewed: Yes    Medications: Medication refills not needed today.  Pharmacy name entered into [employer]: [id number] [place] [facility] [place], [place] - [address] [facility]    Clinical concerns: none       Radha Woods"

## 2022-04-26 NOTE — PATIENT INSTRUCTIONS
Individualized Surveillance Plan for women  With 20% or greater lifetime risk of breast cancer   Per NCCN Breast Cancer Screening and Diagnosis Guidelines Version 1.2021   Recommended screening Test or procedure Last done Next Scheduled    Clinical encounter Clinical exam every 6-12 months.   Refer to genetic counseling if not already done.  Consider risk reduction strategies. 4/26/2022 April 2023   However, some family histories with breast cancers at a very young age, may warrant screening starting earlier.    *May begin at age 40 if breast cancers in the family occur at later ages.    Annual mammogram beginning 10 years younger than the earliest breast cancer in the family but not prior to age 30.    Recommend annual breast MRI to begin 10 years younger than the earliest breast cancer in the family but not prior to age 25.    Breast MRIs are preferably done on day 7-15 of the menstrual cycle in premenopausal women. 4/21/2021- Screening tomosynthesis mammogram, [email or web address]    10/27/2021- Breast MRI, BiRads1 Mammogram today    Breast MRI deferred for 2022    Next exam: April 2023 followed by mammogram   Breast screening for patients at high risk due to thoracic radiation between the ages of 10-30   Annual clinical exam beginning 8 years after radiation therapy.    Annual screening mammogram beginning at age 30 or 8 years after radiation therapy    Annual breast MRI, beginning at age 25 or 8 years after radiation therapy.   NA NA   Women who have a lifetime risk of >20% based on history of LCIS or ADH/ALH Annual screening mammogram beginning at age of LCIS or ADH/ALH but not prior to age 30.    Consider annual MRI to begin at age of diagnosis of LCIS or ADH/ALH but not prior to age 25.    Consider risk reducing strategies. NA NA    Recommend risk reducing strategies for women with 1.7% 5 year risk of breast cancer. NA- 1.4% risk within the next 5 years by the Roxanne model NA

## 2022-04-26 NOTE — PROGRESS NOTES
Oncology Risk Management Consultation:  Date on this visit: 2022    Shauna Becerra  requires heightened screening and surveillance for her higher risk of breast cancer, secondary to a family history of breast cancer in her mother, maternal aunt, and maternal grandmother. She is considered to have a 31.4% lifetime risk for breast cancer by the KWESI model.  She has a 1.4% risk for breast cancer within the next 5 years by the Roxanne model. Also present at this visit is Marcia Mccullough,  Doctorate of Nursing Practice student at the [employer], who is shadowing in clinic today.      Primary Physician: Evelin Tellez, APRN-CNP    History Of Present Illness:  Ms. Becerra is a very pleasant  47 year old female who presents with a family history of breast cancer.     Pertinent history:  Menarche  at age 15  First child at age 41  Perimenopausal.   LMP: about 30 days apart  Hx of breastfeeding: abut 3 months  History of Factor V Leiden  Breast density: heterogeneously dense  Ovaries, fallopian tubes and uterus in place  No hx of hormone replacement therapy.    No history of atypia and malignancy    History of pancreatic cancer including her father at 63 (first degree), paternal grandmother at 75 (second degree) and paternal (grandfather's sister, second degree) at an unknown age. She is following pancreatic cancer screening with Dr. Qamar Bridges.    Genetic testin2020 -- negative for mutations in the APC, LYNN, AXIN2, BARD1, BMPR1A, BRCA1, BRCA2, BRIP1, CDH1, CDK4, CDKN2A, CHEK2, DICER1, EPCAM, GREM1, HOXB13, MLH1, MSH2, MSH3, MSH6, MUTYH, NBN, NF1, NTHL1, PALB2, PMS2, POLD1, POLE, PTEN, RAD51C, RAD51D, RECQL, SMAD4, SMARCA4, STK11 and TP53 genes. No mutations were found in any of the 36 genes analyzed.using the BRCA1/2 and Miranda Syndrome Analyses with the Cancer Next panel from [employer]. This testing was done because of Shauna's family history of breast, pancreatic, and uterine  cancer. Of note, Shauna requested today's date to review her test results due to a change in insurance coverage.     Pertinent screening history:  7/26/2013- Bilateral diagnostic mammogram and left breast US for palpable lump Palpable lump left breast. Patient noticed lump 3 weeks   prior and feels it has decreased in size since that time. BiRads1  10/18/2017- Screening mammogram, BiRads1  10/18/2018- Screening mammogram, BiRads1  10/17/2019- Screening mammogram, BiRads1  4/17/2020- Diagnostic left breast mammogram and US, for Palpable inferior left breast area of concern, both BiRads1  4/21/2021- Screening tomosynthesis mammogram, BiRads1  10/27/2021- Breast MRI, BiRads1    At this visit, she does not practice self exams routinely but denies new fatigue, breast pain, asymmetry, lumps, masses, thickening, nipple discharge and skin changes in her breasts.    Past Medical/Surgical History:  Past Medical History:   Diagnosis Date     Acne      Asthma      Embolus (H) 26 yo    of lung from right leg-was on coumadin for a long while     Factor V Leiden (H)     cannot have birth control     GERD (gastroesophageal reflux disease)      Menarche age 12+    cycles q  24-27 x 4-5d      Past Surgical History:   Procedure Laterality Date     HC TOOTH EXTRACTION W/FORCEP         Allergies:  Allergies as of 04/26/2022 - Reviewed 04/26/2022   Allergen Reaction Noted     Nkda [no known drug allergies]  08/30/2013       Current Medications:  Current Outpatient Medications   Medication Sig Dispense Refill     albuterol (PROAIR HFA/PROVENTIL HFA/VENTOLIN HFA) 108 (90 Base) MCG/ACT inhaler INHALE 2 PUFFS BY MOUTH EVERY 6 HOURS AS NEEDED FOR WHEEZE OR FOR SHORTNESS OF BREATH 18 g 1     fluticasone (FLOVENT HFA) 110 MCG/ACT inhaler TAKE 1 PUFF BY MOUTH TWICE A DAY (Patient taking differently: daily as needed TAKE 1 PUFF BY MOUTH TWICE A DAY) 12 g 1     valACYclovir (VALTREX) 1000 mg tablet Take 2 tablets (2,000 mg) by mouth 2 times daily  4 tablet 3     valACYclovir (VALTREX) 500 MG tablet Take 1 tablet (500 mg) by mouth daily 90 tablet 3        Family History:  Family History   Problem Relation Age of Onset     Thrombophilia Mother      Breast Cancer Mother 67        radiation and lumpectomy     Migraines Mother      Genitourinary Problems Mother      GERD Mother      Pancreatic Cancer Father 63        hx of smoking     Breast Cancer Maternal Grandmother 70     Lung Cancer Maternal Grandfather 89     Skin Cancer Maternal Grandfather      Breast Cancer Paternal Grandmother         after age 50     Uterine Cancer Paternal Grandmother         after age 50     Pancreatic Cancer Paternal Grandmother 75     Chronic Obstructive Pulmonary Disease Paternal Grandfather      Breast Cancer Maternal Aunt 55     Pancreatic Cancer Paternal Aunt 55        paternal grandfather's sister     Deep Vein Thrombosis Other        Social History:  Social History     Socioeconomic History     Marital status: Single     Spouse name: Not on file     Number of children: 1     Years of education: Not on file     Highest education level: Doctorate   Occupational History     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 No   Social History Narrative    Caffeine intake/servings daily - 0    Calcium intake/servings daily - 3    Exercise 5 times weekly - describe ; cardio, weights, bikes    Sunscreen used - Yes    Seatbelts used - Yes    Guns stored in the home - No    Self Breast Exam - Yes    Pap test up to date -  Due for pap    Eye exam up to date -  Yes    Dental exam up to date -  Yes     DEXA scan up to date -  No    Flex Sig/Colonoscopy up to date -  No    Mammography up to date -  Yes , family history of breast cancer    Immunizations reviewed and up to date - Yes    Abuse: Current or Past (Physical, Sexual or Emotional) - No    Do you feel safe in your environment - Yes    Do you cope well with stress - Yes    Do you suffer from insomnia - No    Last updated by: Kari Lopez  2/23/2016             Social Determinants of Health     Financial Resource Strain: Not on file   Food Insecurity: Not on file   Transportation Needs: Not on file   Physical Activity: Not on file   Stress: Not on file   Social Connections: Not on file   Intimate Partner Violence: Not At Risk     Fear of Current or Ex-Partner: No     Emotionally Abused: No     Physically Abused: No     Sexually Abused: No   Housing Stability: Not on file       Physical Exam:  /75   Pulse 60   Temp 98  F (36.7  C) (Oral)   Wt 86.6 kg (191 lb)   SpO2 98%   BMI 30.83 kg/m      GENERAL APPEARANCE: healthy, alert and no distress     NECK: no adenopathy, no asymmetry or masses     RESP: lungs clear to auscultation - no rales, rhonchi or wheezes    BREAST: A multipositional, bilateral breast exam was performed.  Symmetrical breasts, nipples everted bilaterally, mild red raised rash between and underneath breasts. Right breast: no palpable dominant masses, no nipple discharge, no skin changes. Dense tissue.  Right axilla: no palpable adenopathy. Left breast: no palpable dominant masses, no nipple discharge, no skin changes.Minor areas of fibrocystic changes in left breast superior to nipple. Left axilla: no palpable adenopathy. Dense tissue.   LYMPHATICS: No cervical, supraclavicular or axillary lymphadenopathy     CARDIOVASCULAR: regular rate and rhythm, normal S1 S2, no S3 or S4 and no murmur.       SKIN: no suspicious lesions or rashes  Laboratory/Imaging Studies  No results found for any visits on  04/26/22.    ASSESSMENT  We discussed her last screening tests and reviewed results.  She has been doing well, started a new job with [employer], which she likes, and is looking for a new house in [place].    We discussed that she should start colon screening this year and could decide whether she would like a baseline colonoscopy or a Cologuard test.  She is not considered to be at high risk for colon cancer but should begin screening now.     We also discussed that her pancreatic cancer screening would start at age 50 with an MRCP; there are no new cancers in her family.     Her exam today was unremarkable and she will defer her breast MRI, due to concerns about gadolinium and financial cost. She will see me for another exam and mammogram in one year.  Individualized Surveillance Plan for women  With 20% or greater lifetime risk of breast cancer   Per NCCN Breast Cancer Screening and Diagnosis Guidelines Version 1.2021   Recommended screening Test or procedure Last done Next Scheduled    Clinical encounter Clinical exam every 6-12 months.   Refer to genetic counseling if not already done.  Consider risk reduction strategies. 4/26/2022 April 2023   However, some family histories with breast cancers at a very young age, may warrant screening starting earlier.    *May begin at age 40 if breast cancers in the family occur at later ages.    Annual mammogram beginning 10 years younger than the earliest breast cancer in the family but not prior to age 30.    Recommend annual breast MRI to begin 10 years younger than the earliest breast cancer in the family but not prior to age 25.    Breast MRIs are preferably done on day 7-15 of the menstrual cycle in premenopausal women. 4/21/2021- Screening tomosynthesis mammogram, BiRads1    10/27/2021- Breast MRI, BiRads1 Mammogram today    Breast MRI deferred in 2022    Next exam: April 2023 followed by mammogram   Breast screening for patients at high risk due to  thoracic radiation between the ages of 10-30   Annual clinical exam beginning 8 years after radiation therapy.    Annual screening mammogram beginning at age 30 or 8 years after radiation therapy    Annual breast MRI, beginning at age 25 or 8 years after radiation therapy.   NA NA   Women who have a lifetime risk of >20% based on history of LCIS or ADH/ALH Annual screening mammogram beginning at age of LCIS or ADH/ALH but not prior to age 30.    Consider annual MRI to begin at age of diagnosis of LCIS or ADH/ALH but not prior to age 25.    Consider risk reducing strategies. NA NA    Recommend risk reducing strategies for women with 1.7% 5 year risk of breast cancer. NA- 1.4% risk within the next 5 years by the Roxanne model NA     I spent a total of 58 minutes on the day of the visit. Please see the note for further information on patient assessment and treatment.    THIAGO Mcdonnell-CNS, OCN, AGN-BC  Clinical Nurse Specialist  Cancer Risk Management Program  [facility]      CC: Evelin Tellez MD  .

## 2022-05-05 ENCOUNTER — ANCILLARY PROCEDURE (OUTPATIENT)
Dept: MAMMOGRAPHY | Facility: CLINIC | Age: 47
End: 2022-05-05
Attending: CLINICAL NURSE SPECIALIST
Payer: COMMERCIAL

## 2022-05-05 DIAGNOSIS — R92.8 ABNORMAL MAMMOGRAM OF RIGHT BREAST: ICD-10-CM

## 2022-05-05 PROCEDURE — 76642 ULTRASOUND BREAST LIMITED: CPT | Mod: RT | Performed by: RADIOLOGY

## 2022-05-05 PROCEDURE — G0279 TOMOSYNTHESIS, MAMMO: HCPCS | Performed by: RADIOLOGY

## 2022-05-05 PROCEDURE — 77065 DX MAMMO INCL CAD UNI: CPT | Mod: RT | Performed by: RADIOLOGY

## 2022-05-11 ENCOUNTER — OFFICE VISIT (OUTPATIENT)
Dept: FAMILY MEDICINE | Facility: CLINIC | Age: 47
End: 2022-05-11
Payer: COMMERCIAL

## 2022-05-11 VITALS
WEIGHT: 187 LBS | DIASTOLIC BLOOD PRESSURE: 64 MMHG | SYSTOLIC BLOOD PRESSURE: 116 MMHG | HEIGHT: 66 IN | BODY MASS INDEX: 30.05 KG/M2 | TEMPERATURE: 97.7 F | OXYGEN SATURATION: 99 % | HEART RATE: 59 BPM | RESPIRATION RATE: 16 BRPM

## 2022-05-11 DIAGNOSIS — Z12.11 SCREEN FOR COLON CANCER: ICD-10-CM

## 2022-05-11 DIAGNOSIS — B00.9 HSV (HERPES SIMPLEX VIRUS) INFECTION: ICD-10-CM

## 2022-05-11 DIAGNOSIS — Z00.00 ROUTINE GENERAL MEDICAL EXAMINATION AT A HEALTH CARE FACILITY: Primary | ICD-10-CM

## 2022-05-11 DIAGNOSIS — J45.30 MILD PERSISTENT EXTRINSIC ASTHMA WITHOUT COMPLICATION: ICD-10-CM

## 2022-05-11 DIAGNOSIS — Z80.3 FAMILY HISTORY OF BREAST CANCER: ICD-10-CM

## 2022-05-11 DIAGNOSIS — Z76.89 ENCOUNTER TO ESTABLISH CARE WITH NEW DOCTOR: ICD-10-CM

## 2022-05-11 LAB
ALBUMIN SERPL-MCNC: 3.8 G/DL (ref 3.4–5)
ALP SERPL-CCNC: 54 U/L (ref 40–150)
ALT SERPL W P-5'-P-CCNC: 24 U/L (ref 0–50)
ANION GAP SERPL CALCULATED.3IONS-SCNC: 6 MMOL/L (ref 3–14)
AST SERPL W P-5'-P-CCNC: 17 U/L (ref 0–45)
BILIRUB SERPL-MCNC: 0.6 MG/DL (ref 0.2–1.3)
BUN SERPL-MCNC: 12 MG/DL (ref 7–30)
CALCIUM SERPL-MCNC: 9.1 MG/DL (ref 8.5–10.1)
CHLORIDE BLD-SCNC: 105 MMOL/L (ref 94–109)
CHOLEST SERPL-MCNC: 201 MG/DL
CO2 SERPL-SCNC: 25 MMOL/L (ref 20–32)
CREAT SERPL-MCNC: 0.69 MG/DL (ref 0.52–1.04)
ERYTHROCYTE [DISTWIDTH] IN BLOOD BY AUTOMATED COUNT: 12.8 % (ref 10–15)
FASTING STATUS PATIENT QL REPORTED: YES
GFR SERPL CREATININE-BSD FRML MDRD: >90 ML/MIN/1.73M2
GLUCOSE BLD-MCNC: 97 MG/DL (ref 70–99)
HBA1C MFR BLD: 5.1 % (ref 0–5.6)
HCT VFR BLD AUTO: 42.3 % (ref 35–47)
HDLC SERPL-MCNC: 67 MG/DL
HGB BLD-MCNC: 13.9 G/DL (ref 11.7–15.7)
LDLC SERPL CALC-MCNC: 117 MG/DL
MCH RBC QN AUTO: 29.3 PG (ref 26.5–33)
MCHC RBC AUTO-ENTMCNC: 32.9 G/DL (ref 31.5–36.5)
MCV RBC AUTO: 89 FL (ref 78–100)
NONHDLC SERPL-MCNC: 134 MG/DL
PLATELET # BLD AUTO: 261 10E3/UL (ref 150–450)
POTASSIUM BLD-SCNC: 4 MMOL/L (ref 3.4–5.3)
PROT SERPL-MCNC: 7.9 G/DL (ref 6.8–8.8)
RBC # BLD AUTO: 4.75 10E6/UL (ref 3.8–5.2)
SODIUM SERPL-SCNC: 136 MMOL/L (ref 133–144)
TRIGL SERPL-MCNC: 86 MG/DL
TSH SERPL DL<=0.005 MIU/L-ACNC: 1.59 MU/L (ref 0.4–4)
WBC # BLD AUTO: 7.2 10E3/UL (ref 4–11)

## 2022-05-11 PROCEDURE — 83036 HEMOGLOBIN GLYCOSYLATED A1C: CPT | Performed by: NURSE PRACTITIONER

## 2022-05-11 PROCEDURE — 99214 OFFICE O/P EST MOD 30 MIN: CPT | Mod: 25 | Performed by: NURSE PRACTITIONER

## 2022-05-11 PROCEDURE — 36415 COLL VENOUS BLD VENIPUNCTURE: CPT | Performed by: NURSE PRACTITIONER

## 2022-05-11 PROCEDURE — 99396 PREV VISIT EST AGE 40-64: CPT | Performed by: NURSE PRACTITIONER

## 2022-05-11 PROCEDURE — 85027 COMPLETE CBC AUTOMATED: CPT | Performed by: NURSE PRACTITIONER

## 2022-05-11 PROCEDURE — 80053 COMPREHEN METABOLIC PANEL: CPT | Performed by: NURSE PRACTITIONER

## 2022-05-11 PROCEDURE — 80061 LIPID PANEL: CPT | Performed by: NURSE PRACTITIONER

## 2022-05-11 PROCEDURE — 84443 ASSAY THYROID STIM HORMONE: CPT | Performed by: NURSE PRACTITIONER

## 2022-05-11 RX ORDER — VALACYCLOVIR HYDROCHLORIDE 500 MG/1
500 TABLET, FILM COATED ORAL DAILY
Qty: 90 TABLET | Refills: 11 | Status: SHIPPED | OUTPATIENT
Start: 2022-05-11

## 2022-05-11 RX ORDER — ALBUTEROL SULFATE 90 UG/1
AEROSOL, METERED RESPIRATORY (INHALATION)
Qty: 18 G | Refills: 11 | Status: SHIPPED | OUTPATIENT
Start: 2022-05-11

## 2022-05-11 RX ORDER — VALACYCLOVIR HYDROCHLORIDE 1 G/1
2000 TABLET, FILM COATED ORAL 2 TIMES DAILY
Qty: 4 TABLET | Refills: 11 | Status: SHIPPED | OUTPATIENT
Start: 2022-05-11

## 2022-05-11 RX ORDER — DEXAMETHASONE 4 MG/1
1 TABLET ORAL DAILY PRN
Qty: 12 G | Refills: 11 | Status: SHIPPED | OUTPATIENT
Start: 2022-05-11

## 2022-05-11 ASSESSMENT — ENCOUNTER SYMPTOMS
PARESTHESIAS: 0
HEARTBURN: 0
CONSTIPATION: 0
WEAKNESS: 0
NERVOUS/ANXIOUS: 0
SORE THROAT: 0
CHILLS: 0
FREQUENCY: 0
ARTHRALGIAS: 0
SHORTNESS OF BREATH: 0
DYSURIA: 0
DIZZINESS: 0
BREAST MASS: 0
ABDOMINAL PAIN: 0
MYALGIAS: 0
DIARRHEA: 0
NAUSEA: 0
EYE PAIN: 0
HEMATURIA: 0
COUGH: 0
FEVER: 0
HEMATOCHEZIA: 0
PALPITATIONS: 0
JOINT SWELLING: 0
HEADACHES: 0

## 2022-05-11 ASSESSMENT — ASTHMA QUESTIONNAIRES: ACT_TOTALSCORE: 25

## 2022-05-11 NOTE — PROGRESS NOTES
SUBJECTIVE:   CC: Shauna Becerra is an 47 year old woman who presents for preventive health visit.       Healthy Habits:     Getting at least 3 servings of Calcium per day:  NO    Bi-annual eye exam:  NO    Dental care twice a year:  Yes    Sleep apnea or symptoms of sleep apnea:  None    Diet:  Regular (no restrictions)    Frequency of exercise:  4-5 days/week    Duration of exercise:  Greater than 60 minutes    Taking medications regularly:  Yes    Medication side effects:  Not applicable    PHQ-2 Total Score: 0    Additional concerns today:  Yes    PROBLEMS TO ADD ON...  Asthma Follow-Up    Was ACT completed today?  Yes    ACT Total Scores 5/11/2022   ACT TOTAL SCORE (Goal Greater than or Equal to 20) 25   In the past 12 months, how many times did you visit the emergency room for your asthma without being admitted to the hospital? 0   In the past 12 months, how many times were you hospitalized overnight because of your asthma? 0          How many days per week do you miss taking your asthma controller medication?  0    Please describe any recent triggers for your asthma: pollens    Have you had any Emergency Room Visits, Urgent Care Visits, or Hospital Admissions since your last office visit?  No    HSV requesting medication renewal     Today's PHQ-2 Score:   PHQ-2 ( 1999 Pfizer) 5/11/2022   Q1: Little interest or pleasure in doing things 0   Q2: Feeling down, depressed or hopeless 0   PHQ-2 Score 0   PHQ-2 Total Score (12-17 Years)- Positive if 3 or more points; Administer PHQ-A if positive -   Q1: Little interest or pleasure in doing things Not at all   Q2: Feeling down, depressed or hopeless Not at all   PHQ-2 Score 0     Abuse: Current or Past (Physical, Sexual or Emotional) - No  Do you feel safe in your environment? Yes      Social History     Tobacco Use     Smoking status: Never Smoker     Smokeless tobacco: Never Used   Substance Use Topics     Alcohol use: Yes     Comment: ~1-3 drinks per week      If you drink alcohol do you typically have >3 drinks per day or >7 drinks per week? No    Alcohol Use 5/11/2022   Prescreen: >3 drinks/day or >7 drinks/week? No   Prescreen: >3 drinks/day or >7 drinks/week? -   No flowsheet data found.    Reviewed orders with patient.  Reviewed health maintenance and updated orders accordingly - No  Labs reviewed in EPIC  BP Readings from Last 3 Encounters:   05/11/22 116/64   04/26/22 123/75   08/24/21 106/71    Wt Readings from Last 3 Encounters:   05/11/22 84.8 kg (187 lb)   04/26/22 86.6 kg (191 lb)   10/28/21 81.6 kg (180 lb)          Breast Cancer Screening: mother with breast cancer    FHS-7:   Breast CA Risk Assessment (FHS-7) 4/26/2022   Did any of your first-degree relatives have breast or ovarian cancer? Yes   Did any of your relatives have bilateral breast cancer? No   Did any man in your family have breast cancer? No   Did any woman in your family have breast and ovarian cancer? No   Did any woman in your family have breast cancer before age 50 y? No   Do you have 2 or more relatives with breast and/or ovarian cancer? Yes   Do you have 2 or more relatives with breast and/or bowel cancer? Yes       Mammogram Screening: Recommended annual mammography  Pertinent mammograms are reviewed under the imaging tab.    History of abnormal Pap smear: NO - age 30-65 PAP every 5 years with negative HPV co-testing recommended  PAP / HPV Latest Ref Rng & Units 10/8/2019 3/16/2016 2/20/2013   PAP (Historical) - NIL NIL NIL   HPV16 NEG:Negative Negative - -   HPV18 NEG:Negative Negative - -   HRHPV NEG:Negative Negative - -     Reviewed and updated as needed this visit by clinical staff   Tobacco  Allergies  Meds  Problems  Med Hx  Surg Hx  Fam Hx  Soc   Hx          Reviewed and updated as needed this visit by Provider   Tobacco  Allergies  Meds  Problems  Med Hx  Surg Hx  Fam Hx           Past Medical History:   Diagnosis Date     Acne      Asthma      Embolus (H) 27  "yo    of lung from right leg-was on coumadin for a long while     Factor V Leiden (H)     cannot have birth control     GERD (gastroesophageal reflux disease)      Menarche age 12+    cycles q  24-27 x 4-5d         Review of Systems   Constitutional: Negative for chills and fever.   HENT: Negative for congestion, ear pain, hearing loss and sore throat.    Eyes: Negative for pain and visual disturbance.   Respiratory: Negative for cough and shortness of breath.    Cardiovascular: Negative for chest pain, palpitations and peripheral edema.   Gastrointestinal: Negative for abdominal pain, constipation, diarrhea, heartburn, hematochezia and nausea.   Breasts:  Negative for tenderness, breast mass and discharge.   Genitourinary: Negative for dysuria, frequency, genital sores, hematuria, pelvic pain, urgency, vaginal bleeding and vaginal discharge.   Musculoskeletal: Negative for arthralgias, joint swelling and myalgias.   Skin: Negative for rash.   Neurological: Negative for dizziness, weakness, headaches and paresthesias.   Psychiatric/Behavioral: Negative for mood changes. The patient is not nervous/anxious.           OBJECTIVE:   /64 (BP Location: Right arm, Patient Position: Sitting, Cuff Size: Adult Regular)   Pulse 59   Temp 97.7  F (36.5  C) (Temporal)   Resp 16   Ht 1.676 m (5' 6\")   Wt 84.8 kg (187 lb)   LMP 04/27/2022 (Exact Date)   SpO2 99%   BMI 30.18 kg/m    Physical Exam  Constitutional:       General: She is not in acute distress.     Appearance: She is well-developed.   HENT:      Right Ear: Tympanic membrane and external ear normal.      Left Ear: Tympanic membrane and external ear normal.      Nose: Nose normal.      Mouth/Throat:      Pharynx: No oropharyngeal exudate.   Eyes:      General:         Right eye: No discharge.         Left eye: No discharge.      Conjunctiva/sclera: Conjunctivae normal.      Pupils: Pupils are equal, round, and reactive to light.   Neck:      Thyroid: No " thyromegaly.      Trachea: No tracheal deviation.   Cardiovascular:      Rate and Rhythm: Normal rate and regular rhythm.      Pulses: Normal pulses.      Heart sounds: Normal heart sounds, S1 normal and S2 normal. No murmur heard.    No friction rub. No S3 or S4 sounds.   Pulmonary:      Effort: Pulmonary effort is normal. No respiratory distress.      Breath sounds: Normal breath sounds. No wheezing or rales.   Abdominal:      General: Bowel sounds are normal.      Palpations: Abdomen is soft. There is no mass.      Tenderness: There is no abdominal tenderness.   Musculoskeletal:         General: Normal range of motion.      Cervical back: Neck supple.   Lymphadenopathy:      Cervical: No cervical adenopathy.   Skin:     General: Skin is warm and dry.      Findings: No rash.   Neurological:      Mental Status: She is alert and oriented to person, place, and time.      Motor: No abnormal muscle tone.      Deep Tendon Reflexes: Reflexes are normal and symmetric.   Psychiatric:         Thought Content: Thought content normal.         Judgment: Judgment normal.         ASSESSMENT/PLAN:   Shauna was seen today for physical and lipids.    Diagnoses and all orders for this visit:    Routine general medical examination at a health care facility  Preventative exam w/no abnormalities and/or concerns listed in diagnoses; discussed health maintenance screenings including prostate, breast, cervical and colorectal ca screenings related to gender;  reviewed and reconciled medication, medical history and patient related health concerns  Plan: obtain metabolic labs  -     Comprehensive metabolic panel; Future  -     CBC with platelets; Future  -     Lipid panel reflex to direct LDL Fasting; Future  -     TSH with free T4 reflex; Future  -     Hemoglobin A1c; Future    Screen for colon cancer  Discussed options including FIT, Cologuard, colonoscopy  Plan:  -     COLOGUARD(EXACT SCIENCES)    HSV (herpes simplex virus)  "infection  Stable; continue current regimen; renewed medication  Plan  -     valACYclovir (VALTREX) 500 MG tablet; Take 1 tablet (500 mg) by mouth daily  -     valACYclovir (VALTREX) 1000 mg tablet; Take 2 tablets (2,000 mg) by mouth 2 times daily    Mild persistent extrinsic asthma without complication  Stable; continue current regimen; renewed medication  -     fluticasone (FLOVENT HFA) 110 MCG/ACT inhaler; Inhale 1 puff into the lungs daily as needed (increase shortness of breath) TAKE 1 PUFF BY MOUTH TWICE A DAY  -     albuterol (PROAIR HFA/PROVENTIL HFA/VENTOLIN HFA) 108 (90 Base) MCG/ACT inhaler; INHALE 2 PUFFS BY MOUTH EVERY 6 HOURS AS NEEDED FOR WHEEZE OR FOR SHORTNESS OF BREATH    Encounter to establish care with new doctor  Reviewed chronic health conditions; medications, labs and pertinent health concerns today    Other orders  -     REVIEW OF HEALTH MAINTENANCE PROTOCOL ORDERS        Patient has been advised of split billing requirements and indicates understanding: Yes    COUNSELING:  Reviewed preventive health counseling, as reflected in patient instructions       Colorectal Cancer Screening    Estimated body mass index is 30.18 kg/m  as calculated from the following:    Height as of this encounter: 1.676 m (5' 6\").    Weight as of this encounter: 84.8 kg (187 lb).    Weight management plan: Discussed healthy diet and exercise guidelines    She reports that she has never smoked. She has never used smokeless tobacco.      Counseling Resources:  ATP IV Guidelines  Pooled Cohorts Equation Calculator  Breast Cancer Risk Calculator  BRCA-Related Cancer Risk Assessment: FHS-7 Tool  FRAX Risk Assessment  ICSI Preventive Guidelines  Dietary Guidelines for Americans, 2010  USDA's MyPlate  ASA Prophylaxis  Lung CA Screening    THIAGO Singh [facility]  "

## 2022-05-13 NOTE — RESULT ENCOUNTER NOTE
Dear Shauna,     All test are normal except the following require additional follow up  -LDL(bad) cholesterol level is elevated which can increase your heart disease risk.  A diet high in fat and simple carbohydrates, genetics and being overweight can contribute to this. ADVISE: exercising 150 minutes of aerobic exercise per week (30 minutes for 5 days per week or 50 minutes for 3 days per week are options) and eating a low saturated fat/low carbohydrate diet are helpful to improve this. In 12 months, you should recheck your fasting cholesterol panel by scheduling a lab-only appointment..    Please let me know if you have any questions or concerns.     Regards,  THIAGO Singh CNP

## 2022-05-27 LAB — NONINV COLON CA DNA+OCC BLD SCRN STL QL: NEGATIVE

## 2022-05-29 NOTE — RESULT ENCOUNTER NOTE
Miguel A Villatoro,    Great news!  Your recent results are within the expected range. Please continue with your current plan of care to remain healthy.    Let me know if you have any questions or concerns.    Sincerely,  THIAGO Singh CNP

## 2022-09-20 ENCOUNTER — TELEPHONE (OUTPATIENT)
Dept: ONCOLOGY | Facility: CLINIC | Age: 47
End: 2022-09-20

## 2022-10-25 ENCOUNTER — MYC MEDICAL ADVICE (OUTPATIENT)
Dept: ONCOLOGY | Facility: CLINIC | Age: 47
End: 2022-10-25

## 2022-11-19 ENCOUNTER — MYC MEDICAL ADVICE (OUTPATIENT)
Dept: FAMILY MEDICINE | Facility: CLINIC | Age: 47
End: 2022-11-19

## 2022-11-21 NOTE — TELEPHONE ENCOUNTER
Dr. Cordova and Isac Tripathi --    Please review and advise.     Patient is upset regarding billing/coding.     NEREYDA Pedro RN  [facility]

## 2025-05-08 ENCOUNTER — PATIENT OUTREACH (OUTPATIENT)
Dept: CARE COORDINATION | Facility: CLINIC | Age: 50
End: 2025-05-08
Payer: COMMERCIAL